# Patient Record
Sex: FEMALE
[De-identification: names, ages, dates, MRNs, and addresses within clinical notes are randomized per-mention and may not be internally consistent; named-entity substitution may affect disease eponyms.]

---

## 2017-01-03 ENCOUNTER — APPOINTMENT (OUTPATIENT)
Dept: CARDIOLOGY | Facility: CLINIC | Age: 37
End: 2017-01-03

## 2017-01-03 VITALS
HEART RATE: 87 BPM | SYSTOLIC BLOOD PRESSURE: 114 MMHG | OXYGEN SATURATION: 94 % | BODY MASS INDEX: 29.59 KG/M2 | HEIGHT: 63 IN | WEIGHT: 167 LBS | DIASTOLIC BLOOD PRESSURE: 71 MMHG

## 2017-02-01 ENCOUNTER — MEDICATION RENEWAL (OUTPATIENT)
Age: 37
End: 2017-02-01

## 2017-04-12 ENCOUNTER — APPOINTMENT (OUTPATIENT)
Dept: INTERNAL MEDICINE | Facility: CLINIC | Age: 37
End: 2017-04-12

## 2017-04-12 VITALS
DIASTOLIC BLOOD PRESSURE: 80 MMHG | SYSTOLIC BLOOD PRESSURE: 128 MMHG | HEART RATE: 94 BPM | TEMPERATURE: 98.6 F | BODY MASS INDEX: 29.95 KG/M2 | OXYGEN SATURATION: 98 % | HEIGHT: 63 IN | WEIGHT: 169 LBS

## 2017-04-14 LAB — TSH SERPL-ACNC: 3.13 UIU/ML

## 2017-04-17 ENCOUNTER — FORM ENCOUNTER (OUTPATIENT)
Age: 37
End: 2017-04-17

## 2017-04-18 ENCOUNTER — OUTPATIENT (OUTPATIENT)
Dept: OUTPATIENT SERVICES | Facility: HOSPITAL | Age: 37
LOS: 1 days | End: 2017-04-18
Payer: MEDICAID

## 2017-04-18 ENCOUNTER — APPOINTMENT (OUTPATIENT)
Dept: ULTRASOUND IMAGING | Facility: CLINIC | Age: 37
End: 2017-04-18

## 2017-04-18 ENCOUNTER — APPOINTMENT (OUTPATIENT)
Dept: MAMMOGRAPHY | Facility: CLINIC | Age: 37
End: 2017-04-18

## 2017-04-18 DIAGNOSIS — M54.9 DORSALGIA, UNSPECIFIED: ICD-10-CM

## 2017-04-18 DIAGNOSIS — E89.0 POSTPROCEDURAL HYPOTHYROIDISM: Chronic | ICD-10-CM

## 2017-04-18 DIAGNOSIS — N63 UNSPECIFIED LUMP IN BREAST: ICD-10-CM

## 2017-04-18 PROCEDURE — 77066 DX MAMMO INCL CAD BI: CPT

## 2017-04-18 PROCEDURE — G0279: CPT

## 2017-04-18 PROCEDURE — 76642 ULTRASOUND BREAST LIMITED: CPT

## 2017-05-03 ENCOUNTER — APPOINTMENT (OUTPATIENT)
Dept: INTERNAL MEDICINE | Facility: CLINIC | Age: 37
End: 2017-05-03

## 2017-05-10 ENCOUNTER — MEDICATION RENEWAL (OUTPATIENT)
Age: 37
End: 2017-05-10

## 2017-07-10 ENCOUNTER — APPOINTMENT (OUTPATIENT)
Dept: INTERNAL MEDICINE | Facility: CLINIC | Age: 37
End: 2017-07-10

## 2017-07-10 ENCOUNTER — OTHER (OUTPATIENT)
Age: 37
End: 2017-07-10

## 2017-07-10 ENCOUNTER — NON-APPOINTMENT (OUTPATIENT)
Age: 37
End: 2017-07-10

## 2017-07-10 VITALS
SYSTOLIC BLOOD PRESSURE: 102 MMHG | HEART RATE: 90 BPM | OXYGEN SATURATION: 97 % | DIASTOLIC BLOOD PRESSURE: 64 MMHG | TEMPERATURE: 97.3 F | HEIGHT: 63 IN | WEIGHT: 168 LBS | BODY MASS INDEX: 29.77 KG/M2

## 2017-07-11 LAB
25(OH)D3 SERPL-MCNC: 13.1 NG/ML
BASOPHILS # BLD AUTO: 0.03 K/UL
BASOPHILS NFR BLD AUTO: 0.4 %
EOSINOPHIL # BLD AUTO: 0.04 K/UL
EOSINOPHIL NFR BLD AUTO: 0.5 %
HBA1C MFR BLD HPLC: 5.5 %
HCT VFR BLD CALC: 40.3 %
HGB BLD-MCNC: 13.5 G/DL
HIV1+2 AB SPEC QL IA.RAPID: NONREACTIVE
IMM GRANULOCYTES NFR BLD AUTO: 0.1 %
LYMPHOCYTES # BLD AUTO: 2.74 K/UL
LYMPHOCYTES NFR BLD AUTO: 34.4 %
MAN DIFF?: NORMAL
MCHC RBC-ENTMCNC: 30.8 PG
MCHC RBC-ENTMCNC: 33.5 GM/DL
MCV RBC AUTO: 92 FL
MONOCYTES # BLD AUTO: 0.64 K/UL
MONOCYTES NFR BLD AUTO: 8 %
NEUTROPHILS # BLD AUTO: 4.5 K/UL
NEUTROPHILS NFR BLD AUTO: 56.6 %
PLATELET # BLD AUTO: 195 K/UL
RBC # BLD: 4.38 M/UL
RBC # FLD: 13.7 %
TSH SERPL-ACNC: 1.42 UIU/ML
WBC # FLD AUTO: 7.96 K/UL

## 2017-07-12 ENCOUNTER — CLINICAL ADVICE (OUTPATIENT)
Age: 37
End: 2017-07-12

## 2017-07-12 LAB
ALBUMIN SERPL ELPH-MCNC: 4.4 G/DL
ALP BLD-CCNC: 74 U/L
ALT SERPL-CCNC: 10 U/L
ANION GAP SERPL CALC-SCNC: 13 MMOL/L
AST SERPL-CCNC: 20 U/L
BILIRUB SERPL-MCNC: 0.2 MG/DL
BUN SERPL-MCNC: 14 MG/DL
CALCIUM SERPL-MCNC: 9 MG/DL
CHLORIDE SERPL-SCNC: 106 MMOL/L
CHOLEST SERPL-MCNC: 164 MG/DL
CHOLEST/HDLC SERPL: 2.6 RATIO
CK SERPL-CCNC: 290 U/L
CO2 SERPL-SCNC: 24 MMOL/L
CREAT SERPL-MCNC: 0.92 MG/DL
GLUCOSE SERPL-MCNC: 71 MG/DL
HDLC SERPL-MCNC: 63 MG/DL
LDLC SERPL CALC-MCNC: 92 MG/DL
POTASSIUM SERPL-SCNC: 4.2 MMOL/L
PROT SERPL-MCNC: 7.3 G/DL
SODIUM SERPL-SCNC: 143 MMOL/L
TRIGL SERPL-MCNC: 45 MG/DL
URATE SERPL-MCNC: 2.2 MG/DL

## 2017-08-07 ENCOUNTER — RESULT CHARGE (OUTPATIENT)
Age: 37
End: 2017-08-07

## 2017-08-08 ENCOUNTER — APPOINTMENT (OUTPATIENT)
Dept: CARDIOLOGY | Facility: CLINIC | Age: 37
End: 2017-08-08
Payer: MEDICAID

## 2017-08-08 VITALS
HEART RATE: 78 BPM | OXYGEN SATURATION: 98 % | WEIGHT: 167 LBS | BODY MASS INDEX: 29.59 KG/M2 | DIASTOLIC BLOOD PRESSURE: 74 MMHG | SYSTOLIC BLOOD PRESSURE: 110 MMHG | HEIGHT: 63 IN

## 2017-08-08 PROCEDURE — 99214 OFFICE O/P EST MOD 30 MIN: CPT | Mod: 25

## 2017-08-08 PROCEDURE — 93000 ELECTROCARDIOGRAM COMPLETE: CPT

## 2017-08-09 ENCOUNTER — APPOINTMENT (OUTPATIENT)
Dept: INTERNAL MEDICINE | Facility: CLINIC | Age: 37
End: 2017-08-09

## 2017-08-11 ENCOUNTER — MEDICATION RENEWAL (OUTPATIENT)
Age: 37
End: 2017-08-11

## 2017-08-15 ENCOUNTER — APPOINTMENT (OUTPATIENT)
Dept: CARDIOLOGY | Facility: CLINIC | Age: 37
End: 2017-08-15
Payer: MEDICAID

## 2017-08-15 PROCEDURE — 93306 TTE W/DOPPLER COMPLETE: CPT

## 2017-11-11 ENCOUNTER — MEDICATION RENEWAL (OUTPATIENT)
Age: 37
End: 2017-11-11

## 2017-12-12 ENCOUNTER — LABORATORY RESULT (OUTPATIENT)
Age: 37
End: 2017-12-12

## 2017-12-13 ENCOUNTER — MEDICATION RENEWAL (OUTPATIENT)
Age: 37
End: 2017-12-13

## 2017-12-16 ENCOUNTER — APPOINTMENT (OUTPATIENT)
Dept: INTERNAL MEDICINE | Facility: CLINIC | Age: 37
End: 2017-12-16
Payer: MEDICAID

## 2017-12-16 VITALS
SYSTOLIC BLOOD PRESSURE: 110 MMHG | BODY MASS INDEX: 29.41 KG/M2 | DIASTOLIC BLOOD PRESSURE: 70 MMHG | WEIGHT: 166 LBS | HEART RATE: 80 BPM | HEIGHT: 63 IN | TEMPERATURE: 97.9 F | OXYGEN SATURATION: 96 %

## 2017-12-16 DIAGNOSIS — R31.29 OTHER MICROSCOPIC HEMATURIA: ICD-10-CM

## 2017-12-16 DIAGNOSIS — Z23 ENCOUNTER FOR IMMUNIZATION: ICD-10-CM

## 2017-12-16 PROCEDURE — 99214 OFFICE O/P EST MOD 30 MIN: CPT

## 2017-12-16 RX ORDER — CHOLECALCIFEROL (VITAMIN D3) 1250 MCG
1.25 MG CAPSULE ORAL
Qty: 4 | Refills: 0 | Status: COMPLETED | COMMUNITY
Start: 2017-07-11

## 2017-12-17 ENCOUNTER — FORM ENCOUNTER (OUTPATIENT)
Age: 37
End: 2017-12-17

## 2017-12-18 ENCOUNTER — OUTPATIENT (OUTPATIENT)
Dept: OUTPATIENT SERVICES | Facility: HOSPITAL | Age: 37
LOS: 1 days | End: 2017-12-18
Payer: MEDICAID

## 2017-12-18 ENCOUNTER — APPOINTMENT (OUTPATIENT)
Dept: ULTRASOUND IMAGING | Facility: CLINIC | Age: 37
End: 2017-12-18
Payer: MEDICAID

## 2017-12-18 DIAGNOSIS — Z00.8 ENCOUNTER FOR OTHER GENERAL EXAMINATION: ICD-10-CM

## 2017-12-18 DIAGNOSIS — E89.0 POSTPROCEDURAL HYPOTHYROIDISM: Chronic | ICD-10-CM

## 2017-12-18 LAB — TSH SERPL-ACNC: 1.55 UIU/ML

## 2017-12-18 PROCEDURE — 76857 US EXAM PELVIC LIMITED: CPT

## 2017-12-18 PROCEDURE — 76857 US EXAM PELVIC LIMITED: CPT | Mod: 26

## 2017-12-22 ENCOUNTER — APPOINTMENT (OUTPATIENT)
Dept: INTERNAL MEDICINE | Facility: CLINIC | Age: 37
End: 2017-12-22

## 2018-01-01 ENCOUNTER — OUTPATIENT (OUTPATIENT)
Dept: OUTPATIENT SERVICES | Facility: HOSPITAL | Age: 38
LOS: 1 days | End: 2018-01-01
Payer: MEDICAID

## 2018-01-01 DIAGNOSIS — E89.0 POSTPROCEDURAL HYPOTHYROIDISM: Chronic | ICD-10-CM

## 2018-01-01 PROCEDURE — G9001: CPT

## 2018-01-12 ENCOUNTER — TRANSCRIPTION ENCOUNTER (OUTPATIENT)
Age: 38
End: 2018-01-12

## 2018-01-13 ENCOUNTER — EMERGENCY (EMERGENCY)
Facility: HOSPITAL | Age: 38
LOS: 1 days | Discharge: HOME | End: 2018-01-13
Attending: EMERGENCY MEDICINE
Payer: COMMERCIAL

## 2018-01-13 ENCOUNTER — MEDICATION RENEWAL (OUTPATIENT)
Age: 38
End: 2018-01-13

## 2018-01-13 VITALS
HEIGHT: 64 IN | TEMPERATURE: 97 F | WEIGHT: 169.98 LBS | RESPIRATION RATE: 16 BRPM | SYSTOLIC BLOOD PRESSURE: 120 MMHG | OXYGEN SATURATION: 99 % | HEART RATE: 78 BPM | DIASTOLIC BLOOD PRESSURE: 81 MMHG

## 2018-01-13 VITALS
HEART RATE: 74 BPM | SYSTOLIC BLOOD PRESSURE: 124 MMHG | DIASTOLIC BLOOD PRESSURE: 78 MMHG | OXYGEN SATURATION: 99 % | RESPIRATION RATE: 18 BRPM

## 2018-01-13 DIAGNOSIS — E89.0 POSTPROCEDURAL HYPOTHYROIDISM: Chronic | ICD-10-CM

## 2018-01-13 LAB
ALBUMIN SERPL ELPH-MCNC: 3.8 G/DL — SIGNIFICANT CHANGE UP (ref 3.3–5.2)
ALP SERPL-CCNC: 87 U/L — SIGNIFICANT CHANGE UP (ref 40–120)
ALT FLD-CCNC: 8 U/L — SIGNIFICANT CHANGE UP
ANION GAP SERPL CALC-SCNC: 13 MMOL/L — SIGNIFICANT CHANGE UP (ref 5–17)
APPEARANCE UR: CLEAR — SIGNIFICANT CHANGE UP
APTT BLD: 28.6 SEC — SIGNIFICANT CHANGE UP (ref 27.5–37.4)
AST SERPL-CCNC: 16 U/L — SIGNIFICANT CHANGE UP
BASOPHILS # BLD AUTO: 0 K/UL — SIGNIFICANT CHANGE UP (ref 0–0.2)
BASOPHILS NFR BLD AUTO: 0.3 % — SIGNIFICANT CHANGE UP (ref 0–2)
BILIRUB SERPL-MCNC: 0.2 MG/DL — LOW (ref 0.4–2)
BILIRUB UR-MCNC: NEGATIVE — SIGNIFICANT CHANGE UP
BUN SERPL-MCNC: 12 MG/DL — SIGNIFICANT CHANGE UP (ref 8–20)
CALCIUM SERPL-MCNC: 8.8 MG/DL — SIGNIFICANT CHANGE UP (ref 8.6–10.2)
CHLORIDE SERPL-SCNC: 105 MMOL/L — SIGNIFICANT CHANGE UP (ref 98–107)
CO2 SERPL-SCNC: 25 MMOL/L — SIGNIFICANT CHANGE UP (ref 22–29)
COLOR SPEC: YELLOW — SIGNIFICANT CHANGE UP
CREAT SERPL-MCNC: 0.61 MG/DL — SIGNIFICANT CHANGE UP (ref 0.5–1.3)
DIFF PNL FLD: ABNORMAL
EOSINOPHIL # BLD AUTO: 0 K/UL — SIGNIFICANT CHANGE UP (ref 0–0.5)
EOSINOPHIL NFR BLD AUTO: 0.6 % — SIGNIFICANT CHANGE UP (ref 0–6)
EPI CELLS # UR: SIGNIFICANT CHANGE UP
GLUCOSE SERPL-MCNC: 81 MG/DL — SIGNIFICANT CHANGE UP (ref 70–115)
GLUCOSE UR QL: NEGATIVE MG/DL — SIGNIFICANT CHANGE UP
HCG UR QL: NEGATIVE — SIGNIFICANT CHANGE UP
HCT VFR BLD CALC: 40.9 % — SIGNIFICANT CHANGE UP (ref 37–47)
HGB BLD-MCNC: 13.8 G/DL — SIGNIFICANT CHANGE UP (ref 12–16)
INR BLD: 1.09 RATIO — SIGNIFICANT CHANGE UP (ref 0.88–1.16)
KETONES UR-MCNC: NEGATIVE — SIGNIFICANT CHANGE UP
LEUKOCYTE ESTERASE UR-ACNC: ABNORMAL
LYMPHOCYTES # BLD AUTO: 1.7 K/UL — SIGNIFICANT CHANGE UP (ref 1–4.8)
LYMPHOCYTES # BLD AUTO: 23.7 % — SIGNIFICANT CHANGE UP (ref 20–55)
MAGNESIUM SERPL-MCNC: 1.8 MG/DL — SIGNIFICANT CHANGE UP (ref 1.6–2.6)
MCHC RBC-ENTMCNC: 30.7 PG — SIGNIFICANT CHANGE UP (ref 27–31)
MCHC RBC-ENTMCNC: 33.7 G/DL — SIGNIFICANT CHANGE UP (ref 32–36)
MCV RBC AUTO: 91.1 FL — SIGNIFICANT CHANGE UP (ref 81–99)
MONOCYTES # BLD AUTO: 0.7 K/UL — SIGNIFICANT CHANGE UP (ref 0–0.8)
MONOCYTES NFR BLD AUTO: 9.8 % — SIGNIFICANT CHANGE UP (ref 3–10)
NEUTROPHILS # BLD AUTO: 4.7 K/UL — SIGNIFICANT CHANGE UP (ref 1.8–8)
NEUTROPHILS NFR BLD AUTO: 65.6 % — SIGNIFICANT CHANGE UP (ref 37–73)
NITRITE UR-MCNC: NEGATIVE — SIGNIFICANT CHANGE UP
PH UR: 7 — SIGNIFICANT CHANGE UP (ref 5–8)
PLATELET # BLD AUTO: 187 K/UL — SIGNIFICANT CHANGE UP (ref 150–400)
POTASSIUM SERPL-MCNC: 3.8 MMOL/L — SIGNIFICANT CHANGE UP (ref 3.5–5.3)
POTASSIUM SERPL-SCNC: 3.8 MMOL/L — SIGNIFICANT CHANGE UP (ref 3.5–5.3)
PROT SERPL-MCNC: 6.9 G/DL — SIGNIFICANT CHANGE UP (ref 6.6–8.7)
PROT UR-MCNC: NEGATIVE MG/DL — SIGNIFICANT CHANGE UP
PROTHROM AB SERPL-ACNC: 12 SEC — SIGNIFICANT CHANGE UP (ref 9.8–12.7)
RBC # BLD: 4.49 M/UL — SIGNIFICANT CHANGE UP (ref 4.4–5.2)
RBC # FLD: 12.6 % — SIGNIFICANT CHANGE UP (ref 11–15.6)
RBC CASTS # UR COMP ASSIST: ABNORMAL /HPF (ref 0–4)
SODIUM SERPL-SCNC: 143 MMOL/L — SIGNIFICANT CHANGE UP (ref 135–145)
SP GR SPEC: 1.01 — SIGNIFICANT CHANGE UP (ref 1.01–1.02)
TSH SERPL-MCNC: 0.91 UIU/ML — SIGNIFICANT CHANGE UP (ref 0.27–4.2)
URATE SERPL-MCNC: 2.7 MG/DL — SIGNIFICANT CHANGE UP (ref 2.4–5.7)
UROBILINOGEN FLD QL: NEGATIVE MG/DL — SIGNIFICANT CHANGE UP
WBC # BLD: 7.1 K/UL — SIGNIFICANT CHANGE UP (ref 4.8–10.8)
WBC # FLD AUTO: 7.1 K/UL — SIGNIFICANT CHANGE UP (ref 4.8–10.8)
WBC UR QL: SIGNIFICANT CHANGE UP

## 2018-01-13 PROCEDURE — 84550 ASSAY OF BLOOD/URIC ACID: CPT

## 2018-01-13 PROCEDURE — 93005 ELECTROCARDIOGRAM TRACING: CPT

## 2018-01-13 PROCEDURE — 71046 X-RAY EXAM CHEST 2 VIEWS: CPT

## 2018-01-13 PROCEDURE — 85730 THROMBOPLASTIN TIME PARTIAL: CPT

## 2018-01-13 PROCEDURE — 96374 THER/PROPH/DIAG INJ IV PUSH: CPT

## 2018-01-13 PROCEDURE — 84443 ASSAY THYROID STIM HORMONE: CPT

## 2018-01-13 PROCEDURE — 36415 COLL VENOUS BLD VENIPUNCTURE: CPT

## 2018-01-13 PROCEDURE — 81025 URINE PREGNANCY TEST: CPT

## 2018-01-13 PROCEDURE — 83735 ASSAY OF MAGNESIUM: CPT

## 2018-01-13 PROCEDURE — 81001 URINALYSIS AUTO W/SCOPE: CPT

## 2018-01-13 PROCEDURE — 85027 COMPLETE CBC AUTOMATED: CPT

## 2018-01-13 PROCEDURE — 80053 COMPREHEN METABOLIC PANEL: CPT

## 2018-01-13 PROCEDURE — 93010 ELECTROCARDIOGRAM REPORT: CPT

## 2018-01-13 PROCEDURE — 99284 EMERGENCY DEPT VISIT MOD MDM: CPT | Mod: 25

## 2018-01-13 PROCEDURE — 99284 EMERGENCY DEPT VISIT MOD MDM: CPT

## 2018-01-13 PROCEDURE — 71046 X-RAY EXAM CHEST 2 VIEWS: CPT | Mod: 26

## 2018-01-13 PROCEDURE — 85610 PROTHROMBIN TIME: CPT

## 2018-01-13 RX ORDER — METOCLOPRAMIDE HCL 10 MG
10 TABLET ORAL ONCE
Qty: 0 | Refills: 0 | Status: COMPLETED | OUTPATIENT
Start: 2018-01-13 | End: 2018-01-13

## 2018-01-13 RX ORDER — SODIUM CHLORIDE 9 MG/ML
999 INJECTION INTRAMUSCULAR; INTRAVENOUS; SUBCUTANEOUS ONCE
Qty: 0 | Refills: 0 | Status: COMPLETED | OUTPATIENT
Start: 2018-01-13 | End: 2018-01-13

## 2018-01-13 RX ADMIN — Medication 104 MILLIGRAM(S): at 21:00

## 2018-01-13 RX ADMIN — SODIUM CHLORIDE 999 MILLILITER(S): 9 INJECTION INTRAMUSCULAR; INTRAVENOUS; SUBCUTANEOUS at 21:00

## 2018-01-13 NOTE — ED ADULT TRIAGE NOTE - CHIEF COMPLAINT QUOTE
pt presents to ED with tingling to top of head and palpitations x few days. pt c.o chest pain and sob, breathing si even and unlabored. a&ox3 pt also c/o pressure in her ears and her nose with chills.

## 2018-01-13 NOTE — ED PROVIDER NOTE - NS ED ROS FT
no weight change, no fever or chills  no rash, no bruises  no visual changes no eye discharge  no cough cold or congestion,   no sob, no chest pain, +palpitations   no orthopnea, no pnd  no abd pain, no n/v/d  no hematuria, no change in urinary habits  no joint pain, no deformity  no headache, + paresthesia no weight change, no fever or chills  no rash, no bruises  no visual changes no eye discharge  no cough cold or congestion,   + sob, + chest pain, +palpitations   no orthopnea, no pnd  no abd pain, no n/v/d  no hematuria, no change in urinary habits  no joint pain, no deformity  no headache, + paresthesia

## 2018-01-13 NOTE — ED PROVIDER NOTE - MEDICAL DECISION MAKING DETAILS
36 y/o with hx 38 y/o with hx of anxiety presents for hyperventilation and headache, plan obtain labs, fluids, and reevaluate.

## 2018-01-13 NOTE — ED PROVIDER NOTE - PHYSICAL EXAMINATION
Constitutional : Appears comfortably, talking in full sentences  Head :NC AT , no swelling  Eyes :eomi, no swelling  Mouth :mm moist,  Neck : supple, trachea in midline  Chest :Juan air entry, symm chest expansion, no distress  Heart :S1 S2 distant  Abdomen :abd soft, non tender  Musc/Skel :ext no swelling, no deformity, no spine tenderness, distal pulses present  Neuro  :AAO 3 no focal deficits Constitutional : Appears comfortably, talking in full sentences  Head :NC AT , no swelling  Eyes :eomi, no swelling  Mouth :mm moist,  Neck : supple, trachea in midline  Chest :Juan air entry, symm chest expansion, no distress  Heart :S1 S2 distant  Abdomen :abd soft, non tender  Musc/Skel : keloid scar to neck. ext no swelling, no deformity, no spine tenderness, distal pulses present  Neuro  :AAO 3 no focal deficits Constitutional : Appears comfortably, talking in full sentences  Head :NC AT , no swelling  Eyes : exophthalmos, eomi, no swelling  Mouth :mm moist,  Neck : supple, trachea in midline  Chest :Juan air entry, symm chest expansion, no distress  Heart :S1 S2 distant  Abdomen :abd soft, non tender  Musc/Skel : keloid scar to neck. ext no swelling, no deformity, no spine tenderness, distal pulses present  Neuro  :AAO 3 no focal deficits

## 2018-01-13 NOTE — ED PROVIDER NOTE - OBJECTIVE STATEMENT
36 y/o female with PMhx glaucoma, Graves disease, hyperthyroidism, mitral valve prolapse, tricuspid valve insufficiency, and PSHx thyroidectomy presents to the ED with c/o palpitations, onset today. Pt reports paresthesia, chest pain, SOB, and congestion. 38 y/o female smoker with PMhx glaucoma, Graves disease, hyperthyroidism, mitral valve prolapse, tricuspid valve insufficiency, and PSHx thyroidectomy (pt is on Synthroid) presents to the ED with c/o palpitations, onset 3 days ago that became worse today. Pt states she was playing a video game when head and face began tingling. Pt reports paresthesia, chest pain, SOB, and headache. Per pt she recently had death in the family. Pt denies n/v, medication changes, fever, chills, and any other acute symptoms and complaints at this time.

## 2018-01-13 NOTE — ED ADULT NURSE NOTE - CHPI ED SYMPTOMS NEG
no dizziness/no chills/no diaphoresis/no back pain/no chest pain/no cough/no vomiting/no shortness of breath/no fever/no syncope

## 2018-01-13 NOTE — ED PROVIDER NOTE - CHPI ED SYMPTOMS POS
CHEST PAIN/PALPITATIONS/SHORTNESS OF BREATH SHORTNESS OF BREATH/PALPITATIONS/CHEST PAIN/tingling, HA

## 2018-01-13 NOTE — ED ADULT NURSE NOTE - OBJECTIVE STATEMENT
patient states that she has tingling to her head- face, nose and ears clogged, feeling flush at times- hot than cold with palpitaions, has HX of thyroid and MVP

## 2018-01-13 NOTE — ED PROVIDER NOTE - PMH
Glaucoma    Goiter    Graves' disease    Hyperthyroidism    Mitral valve prolapse    Non-rheumatic mitral regurgitation    Non-rheumatic tricuspid valve insufficiency    Post-therapeutic hypothyroidism

## 2018-01-15 ENCOUNTER — CHART COPY (OUTPATIENT)
Age: 38
End: 2018-01-15

## 2018-01-15 ENCOUNTER — CLINICAL ADVICE (OUTPATIENT)
Age: 38
End: 2018-01-15

## 2018-01-16 DIAGNOSIS — R69 ILLNESS, UNSPECIFIED: ICD-10-CM

## 2018-01-21 ENCOUNTER — TRANSCRIPTION ENCOUNTER (OUTPATIENT)
Age: 38
End: 2018-01-21

## 2018-01-24 ENCOUNTER — APPOINTMENT (OUTPATIENT)
Dept: INTERNAL MEDICINE | Facility: CLINIC | Age: 38
End: 2018-01-24
Payer: MEDICAID

## 2018-01-24 VITALS
BODY MASS INDEX: 29.23 KG/M2 | TEMPERATURE: 98.4 F | HEART RATE: 89 BPM | OXYGEN SATURATION: 97 % | DIASTOLIC BLOOD PRESSURE: 72 MMHG | HEIGHT: 63 IN | WEIGHT: 165 LBS | SYSTOLIC BLOOD PRESSURE: 110 MMHG

## 2018-01-24 PROCEDURE — 99214 OFFICE O/P EST MOD 30 MIN: CPT

## 2018-01-24 RX ORDER — LEVOTHYROXINE SODIUM 100 UG/1
100 TABLET ORAL DAILY
Qty: 30 | Refills: 2 | Status: DISCONTINUED | COMMUNITY
Start: 2018-01-15 | End: 2018-01-24

## 2018-02-13 ENCOUNTER — APPOINTMENT (OUTPATIENT)
Dept: CARDIOLOGY | Facility: CLINIC | Age: 38
End: 2018-02-13
Payer: MEDICAID

## 2018-02-13 VITALS
HEIGHT: 63 IN | SYSTOLIC BLOOD PRESSURE: 108 MMHG | DIASTOLIC BLOOD PRESSURE: 70 MMHG | WEIGHT: 167 LBS | HEART RATE: 87 BPM | OXYGEN SATURATION: 99 % | BODY MASS INDEX: 29.59 KG/M2

## 2018-02-13 PROCEDURE — 99214 OFFICE O/P EST MOD 30 MIN: CPT

## 2018-02-17 ENCOUNTER — FORM ENCOUNTER (OUTPATIENT)
Age: 38
End: 2018-02-17

## 2018-02-18 ENCOUNTER — APPOINTMENT (OUTPATIENT)
Dept: MRI IMAGING | Facility: CLINIC | Age: 38
End: 2018-02-18
Payer: MEDICAID

## 2018-02-18 ENCOUNTER — OUTPATIENT (OUTPATIENT)
Dept: OUTPATIENT SERVICES | Facility: HOSPITAL | Age: 38
LOS: 1 days | End: 2018-02-18
Payer: MEDICAID

## 2018-02-18 DIAGNOSIS — E89.0 POSTPROCEDURAL HYPOTHYROIDISM: Chronic | ICD-10-CM

## 2018-02-18 DIAGNOSIS — F43.0 ACUTE STRESS REACTION: ICD-10-CM

## 2018-02-18 PROCEDURE — 72141 MRI NECK SPINE W/O DYE: CPT

## 2018-02-18 PROCEDURE — 72141 MRI NECK SPINE W/O DYE: CPT | Mod: 26

## 2018-02-23 ENCOUNTER — MEDICATION RENEWAL (OUTPATIENT)
Age: 38
End: 2018-02-23

## 2018-03-26 ENCOUNTER — MEDICATION RENEWAL (OUTPATIENT)
Age: 38
End: 2018-03-26

## 2018-03-26 ENCOUNTER — RX RENEWAL (OUTPATIENT)
Age: 38
End: 2018-03-26

## 2018-03-28 ENCOUNTER — MEDICATION RENEWAL (OUTPATIENT)
Age: 38
End: 2018-03-28

## 2018-03-28 ENCOUNTER — APPOINTMENT (OUTPATIENT)
Dept: INTERNAL MEDICINE | Facility: CLINIC | Age: 38
End: 2018-03-28
Payer: MEDICAID

## 2018-03-28 VITALS
BODY MASS INDEX: 30.3 KG/M2 | OXYGEN SATURATION: 97 % | WEIGHT: 171 LBS | HEIGHT: 63 IN | SYSTOLIC BLOOD PRESSURE: 110 MMHG | DIASTOLIC BLOOD PRESSURE: 70 MMHG | TEMPERATURE: 98.2 F | HEART RATE: 91 BPM

## 2018-03-28 DIAGNOSIS — Z11.1 ENCOUNTER FOR SCREENING FOR RESPIRATORY TUBERCULOSIS: ICD-10-CM

## 2018-03-28 PROCEDURE — 99214 OFFICE O/P EST MOD 30 MIN: CPT

## 2018-04-24 ENCOUNTER — CLINICAL ADVICE (OUTPATIENT)
Age: 38
End: 2018-04-24

## 2018-04-25 ENCOUNTER — MEDICATION RENEWAL (OUTPATIENT)
Age: 38
End: 2018-04-25

## 2018-04-27 ENCOUNTER — APPOINTMENT (OUTPATIENT)
Dept: DERMATOLOGY | Facility: CLINIC | Age: 38
End: 2018-04-27
Payer: MEDICAID

## 2018-04-27 PROCEDURE — 99203 OFFICE O/P NEW LOW 30 MIN: CPT

## 2018-05-26 ENCOUNTER — MEDICATION RENEWAL (OUTPATIENT)
Age: 38
End: 2018-05-26

## 2018-06-19 ENCOUNTER — APPOINTMENT (OUTPATIENT)
Dept: INTERNAL MEDICINE | Facility: CLINIC | Age: 38
End: 2018-06-19

## 2018-06-25 ENCOUNTER — APPOINTMENT (OUTPATIENT)
Dept: INTERNAL MEDICINE | Facility: CLINIC | Age: 38
End: 2018-06-25

## 2018-07-11 ENCOUNTER — APPOINTMENT (OUTPATIENT)
Dept: DERMATOLOGY | Facility: CLINIC | Age: 38
End: 2018-07-11
Payer: MEDICAID

## 2018-07-11 PROCEDURE — 17106 DSTR CUT VSC PRLF LES<10SQCM: CPT

## 2018-07-27 ENCOUNTER — OTHER (OUTPATIENT)
Age: 38
End: 2018-07-27

## 2018-07-27 ENCOUNTER — APPOINTMENT (OUTPATIENT)
Dept: INTERNAL MEDICINE | Facility: CLINIC | Age: 38
End: 2018-07-27
Payer: MEDICAID

## 2018-07-27 VITALS
HEART RATE: 93 BPM | OXYGEN SATURATION: 98 % | BODY MASS INDEX: 30.3 KG/M2 | SYSTOLIC BLOOD PRESSURE: 110 MMHG | HEIGHT: 63 IN | DIASTOLIC BLOOD PRESSURE: 80 MMHG | TEMPERATURE: 98.6 F | WEIGHT: 171 LBS

## 2018-07-27 PROCEDURE — 36415 COLL VENOUS BLD VENIPUNCTURE: CPT

## 2018-07-27 PROCEDURE — 99214 OFFICE O/P EST MOD 30 MIN: CPT | Mod: 25

## 2018-07-27 RX ORDER — METRONIDAZOLE 7.5 MG/G
0.75 GEL VAGINAL
Qty: 70 | Refills: 0 | Status: COMPLETED | COMMUNITY
Start: 2018-03-15

## 2018-07-27 RX ORDER — MINOXIDIL 2 %
1-0.3 SOLUTION, NON-ORAL TOPICAL
Qty: 30 | Refills: 0 | Status: COMPLETED | COMMUNITY
Start: 2018-02-26

## 2018-07-30 LAB
ALBUMIN SERPL ELPH-MCNC: 4.6 G/DL
ALP BLD-CCNC: 81 U/L
ALT SERPL-CCNC: 14 U/L
ANION GAP SERPL CALC-SCNC: 15 MMOL/L
AST SERPL-CCNC: 23 U/L
BASOPHILS # BLD AUTO: 0.03 K/UL
BASOPHILS NFR BLD AUTO: 0.5 %
BILIRUB SERPL-MCNC: 0.3 MG/DL
BUN SERPL-MCNC: 14 MG/DL
CALCIUM SERPL-MCNC: 9.3 MG/DL
CHLORIDE SERPL-SCNC: 102 MMOL/L
CO2 SERPL-SCNC: 25 MMOL/L
CREAT SERPL-MCNC: 0.8 MG/DL
EOSINOPHIL # BLD AUTO: 0.08 K/UL
EOSINOPHIL NFR BLD AUTO: 1.3 %
GLUCOSE SERPL-MCNC: 93 MG/DL
HCT VFR BLD CALC: 45.9 %
HGB BLD-MCNC: 14.7 G/DL
IMM GRANULOCYTES NFR BLD AUTO: 0 %
LYMPHOCYTES # BLD AUTO: 1.95 K/UL
LYMPHOCYTES NFR BLD AUTO: 32.1 %
MAN DIFF?: NORMAL
MCHC RBC-ENTMCNC: 30.1 PG
MCHC RBC-ENTMCNC: 32 GM/DL
MCV RBC AUTO: 94.1 FL
MONOCYTES # BLD AUTO: 0.58 K/UL
MONOCYTES NFR BLD AUTO: 9.5 %
NEUTROPHILS # BLD AUTO: 3.44 K/UL
NEUTROPHILS NFR BLD AUTO: 56.6 %
PLATELET # BLD AUTO: 204 K/UL
POTASSIUM SERPL-SCNC: 4.6 MMOL/L
PROT SERPL-MCNC: 7.3 G/DL
RBC # BLD: 4.88 M/UL
RBC # FLD: 13.7 %
SODIUM SERPL-SCNC: 142 MMOL/L
T3 SERPL-MCNC: 90 NG/DL
T4 FREE SERPL-MCNC: 1.8 NG/DL
T4 SERPL-MCNC: 11.1 UG/DL
TSH SERPL-ACNC: 0.86 UIU/ML
WBC # FLD AUTO: 6.08 K/UL

## 2018-07-30 RX ADMIN — CYANOCOBALAMIN 0 MCG/ML: 1000 INJECTION, SOLUTION INTRAMUSCULAR at 00:00

## 2018-08-07 ENCOUNTER — APPOINTMENT (OUTPATIENT)
Dept: CARDIOLOGY | Facility: CLINIC | Age: 38
End: 2018-08-07

## 2018-08-15 ENCOUNTER — APPOINTMENT (OUTPATIENT)
Dept: DERMATOLOGY | Facility: CLINIC | Age: 38
End: 2018-08-15

## 2018-08-21 ENCOUNTER — APPOINTMENT (OUTPATIENT)
Dept: INTERNAL MEDICINE | Facility: CLINIC | Age: 38
End: 2018-08-21
Payer: MEDICAID

## 2018-08-21 VITALS
HEART RATE: 87 BPM | SYSTOLIC BLOOD PRESSURE: 120 MMHG | OXYGEN SATURATION: 95 % | WEIGHT: 174 LBS | HEIGHT: 63 IN | DIASTOLIC BLOOD PRESSURE: 78 MMHG | BODY MASS INDEX: 30.83 KG/M2

## 2018-08-21 DIAGNOSIS — R51 HEADACHE: ICD-10-CM

## 2018-08-21 DIAGNOSIS — N76.1 SUBACUTE AND CHRONIC VAGINITIS: ICD-10-CM

## 2018-08-21 DIAGNOSIS — K04.7 PERIAPICAL ABSCESS W/OUT SINUS: ICD-10-CM

## 2018-08-21 DIAGNOSIS — R29.898 OTHER SYMPTOMS AND SIGNS INVOLVING THE MUSCULOSKELETAL SYSTEM: ICD-10-CM

## 2018-08-21 PROCEDURE — 96372 THER/PROPH/DIAG INJ SC/IM: CPT

## 2018-08-21 PROCEDURE — 99213 OFFICE O/P EST LOW 20 MIN: CPT

## 2018-08-22 ENCOUNTER — NON-APPOINTMENT (OUTPATIENT)
Age: 38
End: 2018-08-22

## 2018-08-22 ENCOUNTER — APPOINTMENT (OUTPATIENT)
Dept: CARDIOLOGY | Facility: CLINIC | Age: 38
End: 2018-08-22
Payer: MEDICAID

## 2018-08-22 VITALS
WEIGHT: 173 LBS | DIASTOLIC BLOOD PRESSURE: 67 MMHG | OXYGEN SATURATION: 96 % | SYSTOLIC BLOOD PRESSURE: 106 MMHG | BODY MASS INDEX: 30.65 KG/M2 | HEART RATE: 67 BPM | HEIGHT: 63 IN

## 2018-08-22 VITALS — DIASTOLIC BLOOD PRESSURE: 65 MMHG | SYSTOLIC BLOOD PRESSURE: 100 MMHG

## 2018-08-22 PROCEDURE — 93000 ELECTROCARDIOGRAM COMPLETE: CPT

## 2018-08-22 PROCEDURE — 99214 OFFICE O/P EST MOD 30 MIN: CPT

## 2018-08-24 RX ORDER — CYANOCOBALAMIN 1000 UG/ML
1000 INJECTION INTRAMUSCULAR; SUBCUTANEOUS
Qty: 0 | Refills: 0 | Status: COMPLETED | OUTPATIENT
Start: 2018-07-30

## 2018-08-28 RX ADMIN — CYANOCOBALAMIN 0 MCG/ML: 1000 INJECTION INTRAMUSCULAR; SUBCUTANEOUS at 00:00

## 2018-09-26 ENCOUNTER — APPOINTMENT (OUTPATIENT)
Dept: INTERNAL MEDICINE | Facility: CLINIC | Age: 38
End: 2018-09-26

## 2018-10-30 ENCOUNTER — APPOINTMENT (OUTPATIENT)
Dept: DERMATOLOGY | Facility: CLINIC | Age: 38
End: 2018-10-30
Payer: MEDICAID

## 2018-10-30 PROCEDURE — 99214 OFFICE O/P EST MOD 30 MIN: CPT | Mod: 25

## 2018-10-30 PROCEDURE — 11100 BX SKIN SUBCUTANEOUS&/MUCOUS MEMBRANE 1 LESION: CPT

## 2018-11-06 ENCOUNTER — APPOINTMENT (OUTPATIENT)
Dept: INTERNAL MEDICINE | Facility: CLINIC | Age: 38
End: 2018-11-06
Payer: MEDICAID

## 2018-11-06 PROCEDURE — 96372 THER/PROPH/DIAG INJ SC/IM: CPT

## 2018-11-13 ENCOUNTER — APPOINTMENT (OUTPATIENT)
Dept: DERMATOLOGY | Facility: CLINIC | Age: 38
End: 2018-11-13
Payer: MEDICAID

## 2018-11-13 PROCEDURE — 99214 OFFICE O/P EST MOD 30 MIN: CPT

## 2018-11-14 ENCOUNTER — APPOINTMENT (OUTPATIENT)
Dept: INTERNAL MEDICINE | Facility: CLINIC | Age: 38
End: 2018-11-14
Payer: MEDICAID

## 2018-11-14 ENCOUNTER — OTHER (OUTPATIENT)
Age: 38
End: 2018-11-14

## 2018-11-14 ENCOUNTER — NON-APPOINTMENT (OUTPATIENT)
Age: 38
End: 2018-11-14

## 2018-11-14 VITALS
SYSTOLIC BLOOD PRESSURE: 110 MMHG | BODY MASS INDEX: 31.01 KG/M2 | HEIGHT: 63 IN | TEMPERATURE: 97.9 F | DIASTOLIC BLOOD PRESSURE: 80 MMHG | OXYGEN SATURATION: 96 % | HEART RATE: 97 BPM | WEIGHT: 175 LBS

## 2018-11-14 PROCEDURE — 92552 PURE TONE AUDIOMETRY AIR: CPT

## 2018-11-14 PROCEDURE — 94010 BREATHING CAPACITY TEST: CPT

## 2018-11-14 PROCEDURE — 99395 PREV VISIT EST AGE 18-39: CPT | Mod: 25

## 2018-11-14 PROCEDURE — 36415 COLL VENOUS BLD VENIPUNCTURE: CPT

## 2018-11-14 RX ORDER — FLUCONAZOLE 150 MG/1
150 TABLET ORAL
Qty: 1 | Refills: 1 | Status: DISCONTINUED | COMMUNITY
Start: 2018-04-24 | End: 2018-11-14

## 2018-11-14 RX ORDER — NICOTINE 4 MG/1
10 INHALANT RESPIRATORY (INHALATION)
Qty: 1 | Refills: 2 | Status: DISCONTINUED | COMMUNITY
Start: 2017-04-12 | End: 2018-11-14

## 2018-11-15 LAB
25(OH)D3 SERPL-MCNC: 35.9 NG/ML
ALBUMIN SERPL ELPH-MCNC: 4.8 G/DL
ALP BLD-CCNC: 89 U/L
ALT SERPL-CCNC: 9 U/L
ANION GAP SERPL CALC-SCNC: 15 MMOL/L
APPEARANCE: CLEAR
AST SERPL-CCNC: 22 U/L
BASOPHILS # BLD AUTO: 0.01 K/UL
BASOPHILS NFR BLD AUTO: 0.2 %
BILIRUB SERPL-MCNC: 0.4 MG/DL
BILIRUBIN URINE: NEGATIVE
BLOOD URINE: NEGATIVE
BUN SERPL-MCNC: 13 MG/DL
CALCIUM SERPL-MCNC: 9.7 MG/DL
CHLORIDE SERPL-SCNC: 102 MMOL/L
CHOLEST SERPL-MCNC: 190 MG/DL
CHOLEST/HDLC SERPL: 2.6 RATIO
CK SERPL-CCNC: 98 U/L
CO2 SERPL-SCNC: 24 MMOL/L
COLOR: YELLOW
CREAT SERPL-MCNC: 0.84 MG/DL
EOSINOPHIL # BLD AUTO: 0.05 K/UL
EOSINOPHIL NFR BLD AUTO: 0.8 %
GLUCOSE QUALITATIVE U: NEGATIVE MG/DL
GLUCOSE SERPL-MCNC: 85 MG/DL
HBA1C MFR BLD HPLC: 5.6 %
HCT VFR BLD CALC: 47.5 %
HDLC SERPL-MCNC: 72 MG/DL
HGB BLD-MCNC: 15.5 G/DL
HIV1+2 AB SPEC QL IA.RAPID: NONREACTIVE
IMM GRANULOCYTES NFR BLD AUTO: 0.2 %
KETONES URINE: NEGATIVE
LDLC SERPL CALC-MCNC: 109 MG/DL
LEUKOCYTE ESTERASE URINE: NEGATIVE
LYMPHOCYTES # BLD AUTO: 2.29 K/UL
LYMPHOCYTES NFR BLD AUTO: 36.5 %
MAN DIFF?: NORMAL
MCHC RBC-ENTMCNC: 31.1 PG
MCHC RBC-ENTMCNC: 32.6 GM/DL
MCV RBC AUTO: 95.2 FL
MONOCYTES # BLD AUTO: 0.39 K/UL
MONOCYTES NFR BLD AUTO: 6.2 %
NEUTROPHILS # BLD AUTO: 3.52 K/UL
NEUTROPHILS NFR BLD AUTO: 56.1 %
NITRITE URINE: NEGATIVE
PH URINE: 5.5
PLATELET # BLD AUTO: 241 K/UL
POTASSIUM SERPL-SCNC: 4.1 MMOL/L
PROT SERPL-MCNC: 7.7 G/DL
PROTEIN URINE: NEGATIVE MG/DL
RBC # BLD: 4.99 M/UL
RBC # FLD: 13.8 %
SODIUM SERPL-SCNC: 141 MMOL/L
SPECIFIC GRAVITY URINE: 1.02
TRIGL SERPL-MCNC: 45 MG/DL
TSH SERPL-ACNC: 0.63 UIU/ML
URATE SERPL-MCNC: 2.5 MG/DL
UROBILINOGEN URINE: NEGATIVE MG/DL
WBC # FLD AUTO: 6.27 K/UL

## 2018-11-21 LAB
TESTOST BND SERPL-MCNC: 2.1 PG/ML
TESTOST SERPL-MCNC: 52.4 NG/DL

## 2018-12-05 ENCOUNTER — APPOINTMENT (OUTPATIENT)
Dept: DERMATOLOGY | Facility: CLINIC | Age: 38
End: 2018-12-05
Payer: MEDICAID

## 2018-12-05 PROCEDURE — 17107 DSTR CUT VSC PRLF LES10-50SQ: CPT

## 2018-12-05 PROCEDURE — 99213 OFFICE O/P EST LOW 20 MIN: CPT | Mod: 25

## 2018-12-05 PROCEDURE — 11900 INJECT SKIN LESIONS </W 7: CPT | Mod: 59

## 2019-02-01 ENCOUNTER — RX RENEWAL (OUTPATIENT)
Age: 39
End: 2019-02-01

## 2019-02-05 ENCOUNTER — MEDICATION RENEWAL (OUTPATIENT)
Age: 39
End: 2019-02-05

## 2019-02-06 ENCOUNTER — APPOINTMENT (OUTPATIENT)
Dept: DERMATOLOGY | Facility: CLINIC | Age: 39
End: 2019-02-06
Payer: MEDICAID

## 2019-02-06 PROCEDURE — 99213 OFFICE O/P EST LOW 20 MIN: CPT | Mod: 25,24

## 2019-02-06 PROCEDURE — 11900 INJECT SKIN LESIONS </W 7: CPT | Mod: 79

## 2019-02-20 ENCOUNTER — APPOINTMENT (OUTPATIENT)
Dept: INTERNAL MEDICINE | Facility: CLINIC | Age: 39
End: 2019-02-20
Payer: MEDICAID

## 2019-02-20 VITALS
HEART RATE: 80 BPM | OXYGEN SATURATION: 96 % | TEMPERATURE: 97.8 F | DIASTOLIC BLOOD PRESSURE: 70 MMHG | HEIGHT: 63 IN | WEIGHT: 184 LBS | SYSTOLIC BLOOD PRESSURE: 100 MMHG | BODY MASS INDEX: 32.6 KG/M2

## 2019-02-20 DIAGNOSIS — Z87.898 PERSONAL HISTORY OF OTHER SPECIFIED CONDITIONS: ICD-10-CM

## 2019-02-20 DIAGNOSIS — F43.0 ACUTE STRESS REACTION: ICD-10-CM

## 2019-02-20 PROCEDURE — 99214 OFFICE O/P EST MOD 30 MIN: CPT

## 2019-03-20 ENCOUNTER — APPOINTMENT (OUTPATIENT)
Dept: DERMATOLOGY | Facility: CLINIC | Age: 39
End: 2019-03-20
Payer: MEDICAID

## 2019-03-20 PROCEDURE — 11900 INJECT SKIN LESIONS </W 7: CPT

## 2019-03-20 PROCEDURE — 99213 OFFICE O/P EST LOW 20 MIN: CPT | Mod: 25

## 2019-05-01 ENCOUNTER — APPOINTMENT (OUTPATIENT)
Dept: DERMATOLOGY | Facility: CLINIC | Age: 39
End: 2019-05-01
Payer: COMMERCIAL

## 2019-05-01 PROCEDURE — 11104 PUNCH BX SKIN SINGLE LESION: CPT

## 2019-05-01 PROCEDURE — 99213 OFFICE O/P EST LOW 20 MIN: CPT | Mod: 25

## 2019-05-15 ENCOUNTER — APPOINTMENT (OUTPATIENT)
Dept: DERMATOLOGY | Facility: CLINIC | Age: 39
End: 2019-05-15
Payer: COMMERCIAL

## 2019-05-15 PROCEDURE — 99214 OFFICE O/P EST MOD 30 MIN: CPT

## 2019-05-29 ENCOUNTER — RX RENEWAL (OUTPATIENT)
Age: 39
End: 2019-05-29

## 2019-05-31 ENCOUNTER — CHART COPY (OUTPATIENT)
Age: 39
End: 2019-05-31

## 2019-06-03 ENCOUNTER — CHART COPY (OUTPATIENT)
Age: 39
End: 2019-06-03

## 2019-06-18 ENCOUNTER — MEDICATION RENEWAL (OUTPATIENT)
Age: 39
End: 2019-06-18

## 2019-06-18 ENCOUNTER — RX RENEWAL (OUTPATIENT)
Age: 39
End: 2019-06-18

## 2019-06-19 ENCOUNTER — APPOINTMENT (OUTPATIENT)
Dept: DERMATOLOGY | Facility: CLINIC | Age: 39
End: 2019-06-19

## 2019-07-03 ENCOUNTER — APPOINTMENT (OUTPATIENT)
Dept: INTERNAL MEDICINE | Facility: CLINIC | Age: 39
End: 2019-07-03
Payer: COMMERCIAL

## 2019-07-03 VITALS
HEIGHT: 63 IN | TEMPERATURE: 98.1 F | OXYGEN SATURATION: 98 % | DIASTOLIC BLOOD PRESSURE: 80 MMHG | HEART RATE: 84 BPM | SYSTOLIC BLOOD PRESSURE: 124 MMHG

## 2019-07-03 DIAGNOSIS — R63.5 ABNORMAL WEIGHT GAIN: ICD-10-CM

## 2019-07-03 PROCEDURE — 36415 COLL VENOUS BLD VENIPUNCTURE: CPT

## 2019-07-03 PROCEDURE — 99214 OFFICE O/P EST MOD 30 MIN: CPT | Mod: 25

## 2019-07-08 LAB — TSH SERPL-ACNC: 0.67 UIU/ML

## 2019-07-16 ENCOUNTER — APPOINTMENT (OUTPATIENT)
Dept: INTERNAL MEDICINE | Facility: CLINIC | Age: 39
End: 2019-07-16
Payer: COMMERCIAL

## 2019-07-16 VITALS
RESPIRATION RATE: 16 BRPM | WEIGHT: 184 LBS | TEMPERATURE: 98.2 F | HEART RATE: 76 BPM | SYSTOLIC BLOOD PRESSURE: 120 MMHG | DIASTOLIC BLOOD PRESSURE: 68 MMHG | BODY MASS INDEX: 32.59 KG/M2 | OXYGEN SATURATION: 98 %

## 2019-07-16 PROCEDURE — 99214 OFFICE O/P EST MOD 30 MIN: CPT

## 2019-08-27 ENCOUNTER — APPOINTMENT (OUTPATIENT)
Dept: CARDIOLOGY | Facility: CLINIC | Age: 39
End: 2019-08-27

## 2019-09-13 ENCOUNTER — RX RENEWAL (OUTPATIENT)
Age: 39
End: 2019-09-13

## 2019-09-14 ENCOUNTER — MEDICATION RENEWAL (OUTPATIENT)
Age: 39
End: 2019-09-14

## 2019-09-17 ENCOUNTER — APPOINTMENT (OUTPATIENT)
Dept: INTERNAL MEDICINE | Facility: CLINIC | Age: 39
End: 2019-09-17
Payer: COMMERCIAL

## 2019-09-17 VITALS
WEIGHT: 186 LBS | HEIGHT: 63 IN | HEART RATE: 101 BPM | DIASTOLIC BLOOD PRESSURE: 70 MMHG | BODY MASS INDEX: 32.96 KG/M2 | OXYGEN SATURATION: 97 % | SYSTOLIC BLOOD PRESSURE: 110 MMHG | TEMPERATURE: 98.6 F

## 2019-09-17 DIAGNOSIS — L73.9 FOLLICULAR DISORDER, UNSPECIFIED: ICD-10-CM

## 2019-09-17 PROCEDURE — 99214 OFFICE O/P EST MOD 30 MIN: CPT

## 2019-09-17 NOTE — HISTORY OF PRESENT ILLNESS
[FreeTextEntry8] : cc axillary mass\par \par pt reports that about 4 days ago she noticed a lump in the right axilla. She had shaved before this started and she noticed the next day that it started to grow and became painful. She has not noticed any other symptoms;n There has not been any fever or chills. She has not done anything on it. She did try an antibiotic ointment. the pain is moderate to severe. There are no other associated s/s. It feels better when there is no pressure on the area. it si worse putting pressure on it or moving it.

## 2019-09-17 NOTE — REVIEW OF SYSTEMS
[Fever] : no fever [Chills] : no chills [Headache] : no headache [Dizziness] : no dizziness [Negative] : Heme/Lymph [de-identified] : swelling and tenderness in the right axilla

## 2019-10-02 ENCOUNTER — APPOINTMENT (OUTPATIENT)
Dept: INTERNAL MEDICINE | Facility: CLINIC | Age: 39
End: 2019-10-02

## 2019-12-06 ENCOUNTER — RX RENEWAL (OUTPATIENT)
Age: 39
End: 2019-12-06

## 2019-12-16 ENCOUNTER — MEDICATION RENEWAL (OUTPATIENT)
Age: 39
End: 2019-12-16

## 2020-01-22 ENCOUNTER — APPOINTMENT (OUTPATIENT)
Dept: INTERNAL MEDICINE | Facility: CLINIC | Age: 40
End: 2020-01-22

## 2020-04-03 ENCOUNTER — APPOINTMENT (OUTPATIENT)
Dept: INTERNAL MEDICINE | Facility: CLINIC | Age: 40
End: 2020-04-03
Payer: COMMERCIAL

## 2020-04-03 VITALS
DIASTOLIC BLOOD PRESSURE: 70 MMHG | HEIGHT: 63 IN | SYSTOLIC BLOOD PRESSURE: 120 MMHG | WEIGHT: 178 LBS | HEART RATE: 100 BPM | BODY MASS INDEX: 31.54 KG/M2 | TEMPERATURE: 98.1 F | OXYGEN SATURATION: 95 %

## 2020-04-03 PROCEDURE — 99214 OFFICE O/P EST MOD 30 MIN: CPT | Mod: 25

## 2020-04-03 PROCEDURE — 36415 COLL VENOUS BLD VENIPUNCTURE: CPT

## 2020-04-04 LAB
T3 SERPL-MCNC: 78 NG/DL
T4 FREE SERPL-MCNC: 1.6 NG/DL
T4 SERPL-MCNC: 10.6 UG/DL
TSH SERPL-ACNC: 0.77 UIU/ML

## 2020-04-04 RX ORDER — CEPHALEXIN 500 MG/1
500 TABLET ORAL EVERY 8 HOURS
Qty: 30 | Refills: 0 | Status: DISCONTINUED | COMMUNITY
Start: 2019-09-17 | End: 2020-04-04

## 2020-04-04 RX ORDER — SULFAMETHOXAZOLE AND TRIMETHOPRIM 800; 160 MG/1; MG/1
800-160 TABLET ORAL TWICE DAILY
Qty: 20 | Refills: 1 | Status: DISCONTINUED | COMMUNITY
Start: 2019-09-17 | End: 2020-04-04

## 2020-04-04 RX ORDER — PREDNISONE 5 MG/1
5 TABLET ORAL
Qty: 36 | Refills: 1 | Status: DISCONTINUED | COMMUNITY
Start: 2019-07-16 | End: 2020-04-04

## 2020-04-23 ENCOUNTER — RX RENEWAL (OUTPATIENT)
Age: 40
End: 2020-04-23

## 2020-05-06 ENCOUNTER — APPOINTMENT (OUTPATIENT)
Dept: INTERNAL MEDICINE | Facility: CLINIC | Age: 40
End: 2020-05-06
Payer: COMMERCIAL

## 2020-05-06 VITALS
DIASTOLIC BLOOD PRESSURE: 70 MMHG | TEMPERATURE: 97.7 F | SYSTOLIC BLOOD PRESSURE: 120 MMHG | OXYGEN SATURATION: 97 % | HEIGHT: 63 IN | HEART RATE: 72 BPM

## 2020-05-06 PROCEDURE — 99214 OFFICE O/P EST MOD 30 MIN: CPT

## 2020-05-06 NOTE — HISTORY OF PRESENT ILLNESS
[FreeTextEntry1] : f/u to allergies, constipation, hypothyroidism, and back pain\par tinnitus left > right [de-identified] : pt allergies have been bothering her. The medications have been helping and she will continue what she is taking. The Constipation is better with the Metamucil. the thyroid was tested and the numbers are good at this time.

## 2020-05-06 NOTE — PHYSICAL EXAM
[No Acute Distress] : no acute distress [Normal Sclera/Conjunctiva] : normal sclera/conjunctiva [Normal Outer Ear/Nose] : the outer ears and nose were normal in appearance [No JVD] : no jugular venous distention [No Respiratory Distress] : no respiratory distress  [No Accessory Muscle Use] : no accessory muscle use [de-identified] : no tremors

## 2020-05-06 NOTE — ASSESSMENT
[FreeTextEntry1] : Pt is doing well at this time.  She will continue the allergy medication as before and she will continue her thyroid medication.   She will continue the metamucil as ordered.

## 2020-07-10 ENCOUNTER — APPOINTMENT (OUTPATIENT)
Dept: INTERNAL MEDICINE | Facility: CLINIC | Age: 40
End: 2020-07-10

## 2020-08-02 ENCOUNTER — RX RENEWAL (OUTPATIENT)
Age: 40
End: 2020-08-02

## 2020-09-01 ENCOUNTER — NON-APPOINTMENT (OUTPATIENT)
Age: 40
End: 2020-09-01

## 2020-09-02 ENCOUNTER — APPOINTMENT (OUTPATIENT)
Dept: INTERNAL MEDICINE | Facility: CLINIC | Age: 40
End: 2020-09-02
Payer: COMMERCIAL

## 2020-09-02 ENCOUNTER — LABORATORY RESULT (OUTPATIENT)
Age: 40
End: 2020-09-02

## 2020-09-02 ENCOUNTER — NON-APPOINTMENT (OUTPATIENT)
Age: 40
End: 2020-09-02

## 2020-09-02 VITALS
HEART RATE: 67 BPM | TEMPERATURE: 97.9 F | HEIGHT: 63 IN | WEIGHT: 172 LBS | BODY MASS INDEX: 30.48 KG/M2 | SYSTOLIC BLOOD PRESSURE: 90 MMHG | OXYGEN SATURATION: 98 % | DIASTOLIC BLOOD PRESSURE: 70 MMHG

## 2020-09-02 DIAGNOSIS — J32.9 CHRONIC SINUSITIS, UNSPECIFIED: ICD-10-CM

## 2020-09-02 DIAGNOSIS — Z87.898 PERSONAL HISTORY OF OTHER SPECIFIED CONDITIONS: ICD-10-CM

## 2020-09-02 DIAGNOSIS — Z87.42 PERSONAL HISTORY OF OTHER DISEASES OF THE FEMALE GENITAL TRACT: ICD-10-CM

## 2020-09-02 DIAGNOSIS — Z87.891 PERSONAL HISTORY OF NICOTINE DEPENDENCE: ICD-10-CM

## 2020-09-02 DIAGNOSIS — T14.8XXA OTHER INJURY OF UNSPECIFIED BODY REGION, INITIAL ENCOUNTER: ICD-10-CM

## 2020-09-02 DIAGNOSIS — M25.572 PAIN IN RIGHT ANKLE AND JOINTS OF RIGHT FOOT: ICD-10-CM

## 2020-09-02 DIAGNOSIS — R05 COUGH: ICD-10-CM

## 2020-09-02 DIAGNOSIS — R26.2 DIFFICULTY IN WALKING, NOT ELSEWHERE CLASSIFIED: ICD-10-CM

## 2020-09-02 DIAGNOSIS — M25.571 PAIN IN RIGHT ANKLE AND JOINTS OF RIGHT FOOT: ICD-10-CM

## 2020-09-02 DIAGNOSIS — K59.04 CHRONIC IDIOPATHIC CONSTIPATION: ICD-10-CM

## 2020-09-02 DIAGNOSIS — Z11.59 ENCOUNTER FOR SCREENING FOR OTHER VIRAL DISEASES: ICD-10-CM

## 2020-09-02 PROCEDURE — 99213 OFFICE O/P EST LOW 20 MIN: CPT | Mod: 25

## 2020-09-02 PROCEDURE — 99395 PREV VISIT EST AGE 18-39: CPT | Mod: 25

## 2020-09-02 PROCEDURE — 36415 COLL VENOUS BLD VENIPUNCTURE: CPT

## 2020-09-02 PROCEDURE — 92552 PURE TONE AUDIOMETRY AIR: CPT

## 2020-09-02 PROCEDURE — 93000 ELECTROCARDIOGRAM COMPLETE: CPT

## 2020-09-08 LAB
24R-OH-CALCIDIOL SERPL-MCNC: 49.6 PG/ML
ALBUMIN SERPL ELPH-MCNC: 4.5 G/DL
ALP BLD-CCNC: 77 U/L
ALT SERPL-CCNC: 11 U/L
ANION GAP SERPL CALC-SCNC: 9 MMOL/L
APPEARANCE: ABNORMAL
AST SERPL-CCNC: 19 U/L
BASOPHILS # BLD AUTO: 0.04 K/UL
BASOPHILS NFR BLD AUTO: 0.6 %
BILIRUB SERPL-MCNC: 0.2 MG/DL
BILIRUBIN URINE: NEGATIVE
BLOOD URINE: NEGATIVE
BUN SERPL-MCNC: 14 MG/DL
CALCIUM SERPL-MCNC: 8.9 MG/DL
CHLORIDE SERPL-SCNC: 106 MMOL/L
CHOLEST SERPL-MCNC: 177 MG/DL
CHOLEST/HDLC SERPL: 2.7 RATIO
CK SERPL-CCNC: 96 U/L
CO2 SERPL-SCNC: 26 MMOL/L
COLOR: YELLOW
CREAT SERPL-MCNC: 0.82 MG/DL
CREAT SPEC-SCNC: 170 MG/DL
EOSINOPHIL # BLD AUTO: 0.08 K/UL
EOSINOPHIL NFR BLD AUTO: 1.2 %
ESTIMATED AVERAGE GLUCOSE: 111 MG/DL
GLUCOSE QUALITATIVE U: NEGATIVE
GLUCOSE SERPL-MCNC: 84 MG/DL
HBA1C MFR BLD HPLC: 5.5 %
HCT VFR BLD CALC: 45.5 %
HDLC SERPL-MCNC: 65 MG/DL
HGB BLD-MCNC: 14.5 G/DL
IMM GRANULOCYTES NFR BLD AUTO: 0.2 %
KETONES URINE: NEGATIVE
LDLC SERPL CALC-MCNC: 104 MG/DL
LEUKOCYTE ESTERASE URINE: NEGATIVE
LYMPHOCYTES # BLD AUTO: 2.22 K/UL
LYMPHOCYTES NFR BLD AUTO: 34.3 %
MAN DIFF?: NORMAL
MCHC RBC-ENTMCNC: 30.7 PG
MCHC RBC-ENTMCNC: 31.9 GM/DL
MCV RBC AUTO: 96.2 FL
MICROALBUMIN 24H UR DL<=1MG/L-MCNC: 3.6 MG/DL
MICROALBUMIN/CREAT 24H UR-RTO: 21 MG/G
MONOCYTES # BLD AUTO: 0.52 K/UL
MONOCYTES NFR BLD AUTO: 8 %
NEUTROPHILS # BLD AUTO: 3.61 K/UL
NEUTROPHILS NFR BLD AUTO: 55.7 %
NITRITE URINE: NEGATIVE
PH URINE: 6
PLATELET # BLD AUTO: 219 K/UL
POTASSIUM SERPL-SCNC: 4.5 MMOL/L
PROT SERPL-MCNC: 6.9 G/DL
PROTEIN URINE: NORMAL
RBC # BLD: 4.73 M/UL
RBC # FLD: 14.1 %
SARS-COV-2 IGG SERPL IA-ACNC: 0.03 INDEX
SARS-COV-2 IGG SERPL QL IA: NEGATIVE
SODIUM SERPL-SCNC: 140 MMOL/L
SPECIFIC GRAVITY URINE: 1.03
T3 SERPL-MCNC: 85 NG/DL
T4 FREE SERPL-MCNC: 1.7 NG/DL
T4 SERPL-MCNC: 10.9 UG/DL
TRIGL SERPL-MCNC: 43 MG/DL
TSH SERPL-ACNC: 0.31 UIU/ML
URATE SERPL-MCNC: 2.5 MG/DL
UROBILINOGEN URINE: NORMAL
WBC # FLD AUTO: 6.48 K/UL

## 2020-09-09 ENCOUNTER — NON-APPOINTMENT (OUTPATIENT)
Age: 40
End: 2020-09-09

## 2020-09-09 ENCOUNTER — APPOINTMENT (OUTPATIENT)
Dept: CARDIOLOGY | Facility: CLINIC | Age: 40
End: 2020-09-09
Payer: COMMERCIAL

## 2020-09-09 VITALS
HEART RATE: 79 BPM | OXYGEN SATURATION: 99 % | TEMPERATURE: 98.2 F | SYSTOLIC BLOOD PRESSURE: 116 MMHG | WEIGHT: 171 LBS | DIASTOLIC BLOOD PRESSURE: 78 MMHG | BODY MASS INDEX: 30.29 KG/M2

## 2020-09-09 PROCEDURE — 99215 OFFICE O/P EST HI 40 MIN: CPT

## 2020-09-09 PROCEDURE — 93000 ELECTROCARDIOGRAM COMPLETE: CPT

## 2020-09-09 NOTE — CARDIOLOGY SUMMARY
[___] : [unfilled] [LVEF ___%] : LVEF [unfilled]% [Normal] : normal LA size [Mild] : mild pulmonary hypertension [Moderate] : moderate mitral regurgitation

## 2020-09-09 NOTE — PHYSICAL EXAM
[Normal Appearance] : normal appearance [General Appearance - Well Developed] : well developed [General Appearance - Well Nourished] : well nourished [Normal Conjunctiva] : the conjunctiva exhibited no abnormalities [Normal Oral Mucosa] : normal oral mucosa [Normal Oropharynx] : normal oropharynx [Exaggerated Use Of Accessory Muscles For Inspiration] : no accessory muscle use [Respiration, Rhythm And Depth] : normal respiratory rhythm and effort [] : no respiratory distress [Heart Rate And Rhythm] : heart rate and rhythm were normal [Auscultation Breath Sounds / Voice Sounds] : lungs were clear to auscultation bilaterally [Arterial Pulses Normal] : the arterial pulses were normal [Murmurs] : no murmurs present [Bowel Sounds] : normal bowel sounds [Abdomen Soft] : soft [Abnormal Walk] : normal gait [Nail Clubbing] : no clubbing of the fingernails [Abdomen Tenderness] : non-tender [Skin Color & Pigmentation] : normal skin color and pigmentation [Skin Turgor] : normal skin turgor [Cyanosis, Localized] : no localized cyanosis [Oriented To Time, Place, And Person] : oriented to person, place, and time [Affect] : the affect was normal [FreeTextEntry1] : No systolic murmur, loud P2 component

## 2020-09-09 NOTE — ADDENDUM
[FreeTextEntry1] : Patient is low risk for perioperative cardiac events and there is no contraindications to proceeding with surgery . No need for antibiotics for moderate MR and mitral valve prolapse prior to surgery.

## 2020-09-09 NOTE — HISTORY OF PRESENT ILLNESS
[FreeTextEntry1] : 36 year old female with a history of goiter/hyperthyroidism s/p thyroidectomy in 2013 now on synthroid therapy. I follow her for mitral valve prolapse with moderate MR ( A1and A2 prolapse ) with mild to moderate TR and  no pulmonary hypertension on last echo and preserved  EF.  She c/o of SOB FC I symptoms but stable , no orthopnea or PND, no LE edema. No dizziness, palpitations or syncope.\par She sometimes c/o of panic attacks ( tingling and numbness of Left LE and UE , head clouding , heaviness  overall) \par \par 9/9/2020 \par Has not been seen for 2 years\par c/o of SOB on more than ordinary effort ( FC I) \par Gained weight then lost it again \par c/o of palpitations at night, feeling  like her heart stops and restart again , happened twice not associated with dizziness or syncope \par c/o being fatigued at times\par

## 2020-09-09 NOTE — ASSESSMENT
[FreeTextEntry1] : Mitral valve prolapse with A2-A3 redundant and thickened flail A2 scallop. FC I  , moderate MR in 2018   will obtain an echo to follow severity of MR  , no pulmonary hypertension on last echo in 8/2017\par \par \par non specific T wave changes on EKG, no ischemic symptoms  \par \par Neurological symptoms, prior neurological workup has been negative. \par s/p thyroidectomy on Synthroid.\par \par follow up in 9 months.or earlier if worsening symptoms \par \par

## 2020-09-09 NOTE — DISCUSSION/SUMMARY
[Moderate Mitral Regurgitation] : moderate mitral regurgitation [Echocardiogram] : echocardiogram [Compensated] : compensated [None] : none [Patient] : the patient

## 2020-10-09 ENCOUNTER — APPOINTMENT (OUTPATIENT)
Dept: CARDIOLOGY | Facility: CLINIC | Age: 40
End: 2020-10-09
Payer: COMMERCIAL

## 2020-10-09 PROCEDURE — 93306 TTE W/DOPPLER COMPLETE: CPT

## 2020-10-27 ENCOUNTER — NON-APPOINTMENT (OUTPATIENT)
Age: 40
End: 2020-10-27

## 2020-10-27 ENCOUNTER — TRANSCRIPTION ENCOUNTER (OUTPATIENT)
Age: 40
End: 2020-10-27

## 2020-10-28 ENCOUNTER — RX RENEWAL (OUTPATIENT)
Age: 40
End: 2020-10-28

## 2020-11-04 ENCOUNTER — RX RENEWAL (OUTPATIENT)
Age: 40
End: 2020-11-04

## 2021-01-27 ENCOUNTER — RX RENEWAL (OUTPATIENT)
Age: 41
End: 2021-01-27

## 2021-02-21 ENCOUNTER — EMERGENCY (EMERGENCY)
Facility: HOSPITAL | Age: 41
LOS: 1 days | Discharge: HOME | End: 2021-02-21
Payer: COMMERCIAL

## 2021-02-21 VITALS
SYSTOLIC BLOOD PRESSURE: 114 MMHG | HEIGHT: 64 IN | TEMPERATURE: 98 F | DIASTOLIC BLOOD PRESSURE: 65 MMHG | RESPIRATION RATE: 17 BRPM | HEART RATE: 73 BPM | OXYGEN SATURATION: 98 %

## 2021-02-21 DIAGNOSIS — E89.0 POSTPROCEDURAL HYPOTHYROIDISM: Chronic | ICD-10-CM

## 2021-02-21 LAB — SARS-COV-2 RNA SPEC QL NAA+PROBE: SIGNIFICANT CHANGE UP

## 2021-02-21 PROCEDURE — U0003: CPT

## 2021-02-21 PROCEDURE — 99282 EMERGENCY DEPT VISIT SF MDM: CPT

## 2021-02-21 PROCEDURE — U0005: CPT

## 2021-02-21 PROCEDURE — 99283 EMERGENCY DEPT VISIT LOW MDM: CPT

## 2021-02-21 NOTE — ED PROVIDER NOTE - CLINICAL SUMMARY MEDICAL DECISION MAKING FREE TEXT BOX
41 y/o F asymptomatic with + exposure to covid-19. Well appearing. Pt nontoxic appearing, stable vitals, ambulatory with stable saturation without supplemental oxygen. PT advised about self-quarantine instructions until negative test results and/or symptom resolution. pt advised on hand hygiene, monitoring of symptoms, antipyretic use as well as and fu with primary care provider. Instructions given in pre-printed copy.

## 2021-02-21 NOTE — ED PROVIDER NOTE - PATIENT PORTAL LINK FT
You can access the FollowMyHealth Patient Portal offered by [facility] by registering at the following website: [email or web address]. By joining [employer]’s FollowMyHealth portal, you will also be able to view your health information using other applications (apps) compatible with our system.

## 2021-02-22 ENCOUNTER — NON-APPOINTMENT (OUTPATIENT)
Age: 41
End: 2021-02-22

## 2021-02-27 ENCOUNTER — NON-APPOINTMENT (OUTPATIENT)
Age: 41
End: 2021-02-27

## 2021-02-27 ENCOUNTER — APPOINTMENT (OUTPATIENT)
Dept: INTERNAL MEDICINE | Facility: CLINIC | Age: 41
End: 2021-02-27
Payer: COMMERCIAL

## 2021-02-27 VITALS
DIASTOLIC BLOOD PRESSURE: 80 MMHG | OXYGEN SATURATION: 98 % | HEART RATE: 90 BPM | WEIGHT: 169 LBS | BODY MASS INDEX: 29.95 KG/M2 | TEMPERATURE: 97.7 F | SYSTOLIC BLOOD PRESSURE: 110 MMHG | HEIGHT: 63 IN

## 2021-02-27 DIAGNOSIS — J32.9 CHRONIC SINUSITIS, UNSPECIFIED: ICD-10-CM

## 2021-02-27 DIAGNOSIS — J30.9 ALLERGIC RHINITIS, UNSPECIFIED: ICD-10-CM

## 2021-02-27 DIAGNOSIS — Z20.822 CONTACT WITH AND (SUSPECTED) EXPOSURE TO COVID-19: ICD-10-CM

## 2021-02-27 PROCEDURE — 99214 OFFICE O/P EST MOD 30 MIN: CPT | Mod: 25

## 2021-02-27 PROCEDURE — 99072 ADDL SUPL MATRL&STAF TM PHE: CPT

## 2021-03-01 ENCOUNTER — NON-APPOINTMENT (OUTPATIENT)
Age: 41
End: 2021-03-01

## 2021-03-01 LAB
SARS-COV-2 N GENE NPH QL NAA+PROBE: NOT DETECTED
TSH SERPL-ACNC: 0.46 UIU/ML

## 2021-03-13 ENCOUNTER — APPOINTMENT (OUTPATIENT)
Dept: INTERNAL MEDICINE | Facility: CLINIC | Age: 41
End: 2021-03-13
Payer: COMMERCIAL

## 2021-03-13 VITALS
HEART RATE: 73 BPM | OXYGEN SATURATION: 97 % | WEIGHT: 169 LBS | BODY MASS INDEX: 29.95 KG/M2 | DIASTOLIC BLOOD PRESSURE: 70 MMHG | HEIGHT: 63 IN | TEMPERATURE: 97.9 F | SYSTOLIC BLOOD PRESSURE: 100 MMHG

## 2021-03-13 PROCEDURE — 99072 ADDL SUPL MATRL&STAF TM PHE: CPT

## 2021-03-13 PROCEDURE — 99214 OFFICE O/P EST MOD 30 MIN: CPT

## 2021-04-15 ENCOUNTER — RESULT REVIEW (OUTPATIENT)
Age: 41
End: 2021-04-15

## 2021-04-15 ENCOUNTER — OUTPATIENT (OUTPATIENT)
Dept: OUTPATIENT SERVICES | Facility: HOSPITAL | Age: 41
LOS: 1 days | End: 2021-04-15
Payer: COMMERCIAL

## 2021-04-15 DIAGNOSIS — E89.0 POSTPROCEDURAL HYPOTHYROIDISM: Chronic | ICD-10-CM

## 2021-04-15 DIAGNOSIS — R13.10 DYSPHAGIA, UNSPECIFIED: ICD-10-CM

## 2021-04-15 PROCEDURE — 74230 X-RAY XM SWLNG FUNCJ C+: CPT

## 2021-04-15 PROCEDURE — 92611 MOTION FLUOROSCOPY/SWALLOW: CPT

## 2021-04-15 PROCEDURE — 74230 X-RAY XM SWLNG FUNCJ C+: CPT | Mod: 26

## 2021-04-15 NOTE — SWALLOW VFSS/MBS ASSESSMENT ADULT - ROSENBEK'S PENETRATION ASPIRATION SCALE
(1) no aspiration, contrast does not enter airway :inconsistent/(2) contrast enters airway, remains above the vocal cords, no residue remains (penetration)

## 2021-04-15 NOTE — SWALLOW VFSS/MBS ASSESSMENT ADULT - SLP GENERAL OBSERVATIONS
Pt seen for MBS, seated upright in chair, pleasant & cooperative, baseline throat clearing, 0/10 pain Pt seen for MBS, seated upright in chair, pleasant & cooperative, baseline throat clearing prior to & t/o study, 0/10 pain

## 2021-04-15 NOTE — SWALLOW VFSS/MBS ASSESSMENT ADULT - DIAGNOSTIC IMPRESSIONS
Oral & pharyngeal stage of swallow grossly WFL. Pharyngeal stage grossly WFL. Pt with inconsistent trace penetration during the swallow with complete & spontaneous retrieval, for thin fluids.

## 2021-04-15 NOTE — SWALLOW VFSS/MBS ASSESSMENT ADULT - SLP PERTINENT HISTORY OF CURRENT PROBLEM
Pt reports sensation of "throat closing" sometimes when talking pills & eating solids, Pt reports sensation of "throat closing" sometimes when talking pills & eating solids

## 2021-05-05 ENCOUNTER — TRANSCRIPTION ENCOUNTER (OUTPATIENT)
Age: 41
End: 2021-05-05

## 2021-05-06 ENCOUNTER — NON-APPOINTMENT (OUTPATIENT)
Age: 41
End: 2021-05-06

## 2021-05-11 ENCOUNTER — APPOINTMENT (OUTPATIENT)
Dept: DERMATOLOGY | Facility: CLINIC | Age: 41
End: 2021-05-11

## 2021-05-17 ENCOUNTER — TRANSCRIPTION ENCOUNTER (OUTPATIENT)
Age: 41
End: 2021-05-17

## 2021-05-18 ENCOUNTER — NON-APPOINTMENT (OUTPATIENT)
Age: 41
End: 2021-05-18

## 2021-06-07 ENCOUNTER — APPOINTMENT (OUTPATIENT)
Dept: INTERNAL MEDICINE | Facility: CLINIC | Age: 41
End: 2021-06-07

## 2021-06-11 ENCOUNTER — APPOINTMENT (OUTPATIENT)
Dept: CARDIOLOGY | Facility: CLINIC | Age: 41
End: 2021-06-11

## 2021-07-20 ENCOUNTER — TRANSCRIPTION ENCOUNTER (OUTPATIENT)
Age: 41
End: 2021-07-20

## 2021-07-26 ENCOUNTER — NON-APPOINTMENT (OUTPATIENT)
Age: 41
End: 2021-07-26

## 2021-08-11 ENCOUNTER — APPOINTMENT (OUTPATIENT)
Dept: CARDIOLOGY | Facility: CLINIC | Age: 41
End: 2021-08-11
Payer: COMMERCIAL

## 2021-08-11 ENCOUNTER — NON-APPOINTMENT (OUTPATIENT)
Age: 41
End: 2021-08-11

## 2021-08-11 VITALS
RESPIRATION RATE: 17 BRPM | OXYGEN SATURATION: 98 % | HEIGHT: 63 IN | SYSTOLIC BLOOD PRESSURE: 114 MMHG | HEART RATE: 102 BPM | DIASTOLIC BLOOD PRESSURE: 74 MMHG | TEMPERATURE: 98.2 F | WEIGHT: 172 LBS | BODY MASS INDEX: 30.48 KG/M2

## 2021-08-11 PROCEDURE — 93000 ELECTROCARDIOGRAM COMPLETE: CPT

## 2021-08-11 PROCEDURE — 99214 OFFICE O/P EST MOD 30 MIN: CPT

## 2021-08-11 PROCEDURE — 99215 OFFICE O/P EST HI 40 MIN: CPT

## 2021-08-11 RX ORDER — NAPROXEN 500 MG/1
500 TABLET ORAL
Qty: 60 | Refills: 2 | Status: DISCONTINUED | COMMUNITY
Start: 2019-07-03 | End: 2021-08-11

## 2021-08-11 RX ORDER — METHYLPREDNISOLONE 4 MG/1
4 TABLET ORAL
Qty: 1 | Refills: 1 | Status: DISCONTINUED | COMMUNITY
Start: 2021-02-27 | End: 2021-08-11

## 2021-08-11 NOTE — PHYSICAL EXAM
[General Appearance - Well Developed] : well developed [Normal Appearance] : normal appearance [General Appearance - Well Nourished] : well nourished [Normal Conjunctiva] : the conjunctiva exhibited no abnormalities [Normal Oral Mucosa] : normal oral mucosa [Normal Oropharynx] : normal oropharynx [] : no respiratory distress [Respiration, Rhythm And Depth] : normal respiratory rhythm and effort [Exaggerated Use Of Accessory Muscles For Inspiration] : no accessory muscle use [Auscultation Breath Sounds / Voice Sounds] : lungs were clear to auscultation bilaterally [Heart Rate And Rhythm] : heart rate and rhythm were normal [Murmurs] : no murmurs present [Arterial Pulses Normal] : the arterial pulses were normal [Abnormal Walk] : normal gait [Nail Clubbing] : no clubbing of the fingernails [Cyanosis, Localized] : no localized cyanosis [Oriented To Time, Place, And Person] : oriented to person, place, and time [Affect] : the affect was normal [FreeTextEntry1] : No systolic murmur, loud P2 component

## 2021-08-11 NOTE — ASSESSMENT
[FreeTextEntry1] : Mitral valve prolapse with A2-A3 redundant and thickened flail A2 scallop. FC I  , moderate MR in 2020  , EF 45-50% \par need to confirm that there is no severe MR, especially with EF 45% now \par will order TOREY to evaluate valve morphology and severity of MR, \par \par Follow up based on TOREY results \par \par

## 2021-08-11 NOTE — HISTORY OF PRESENT ILLNESS
[FreeTextEntry1] : 36 year old female with a history of goiter/hyperthyroidism s/p thyroidectomy in 2013 now on synthroid therapy. I follow her for mitral valve prolapse with moderate MR ( A1and A2 prolapse ) with mild to moderate TR and  no pulmonary hypertension on last echo and preserved  EF.  She c/o of SOB FC I symptoms but stable , no orthopnea or PND, no LE edema. No dizziness, palpitations or syncope.\par She sometimes c/o of panic attacks ( tingling and numbness of Left LE and UE , head clouding , heaviness  overall) \par \par 9/9/2020 \par Has not been seen for 2 years\par c/o of SOB on more than ordinary effort ( FC I) \par Gained weight then lost it again \par c/o of palpitations at night, feeling  like her heart stops and restart again , happened twice not associated with dizziness or syncope \par c/o being fatigued at times\par \par 8/11/2021\par Seen last year\par Echo in 10/2020 showed moderate MR, MV prolapse , posteriorly directed MR. \par SOB, occasional , at any time , blamed on asthma most of the time \par c/o occasionally multiple symptoms, (fatigue/numbness) \par \par \par

## 2021-08-11 NOTE — CARDIOLOGY SUMMARY
[___] : [unfilled] [LVEF ___%] : LVEF [unfilled]% [Mild] : mild pulmonary hypertension [Normal] : normal LA size [Moderate] : moderate mitral regurgitation [de-identified] : NSr, diffuse non specific T wave changes

## 2021-09-04 DIAGNOSIS — Z01.818 ENCOUNTER FOR OTHER PREPROCEDURAL EXAMINATION: ICD-10-CM

## 2021-09-05 ENCOUNTER — APPOINTMENT (OUTPATIENT)
Dept: DISASTER EMERGENCY | Facility: CLINIC | Age: 41
End: 2021-09-05

## 2021-09-06 LAB — SARS-COV-2 N GENE NPH QL NAA+PROBE: NOT DETECTED

## 2021-09-08 ENCOUNTER — TRANSCRIPTION ENCOUNTER (OUTPATIENT)
Age: 41
End: 2021-09-08

## 2021-09-08 ENCOUNTER — OUTPATIENT (OUTPATIENT)
Dept: OUTPATIENT SERVICES | Facility: HOSPITAL | Age: 41
LOS: 1 days | End: 2021-09-08
Payer: COMMERCIAL

## 2021-09-08 VITALS
RESPIRATION RATE: 16 BRPM | SYSTOLIC BLOOD PRESSURE: 97 MMHG | HEART RATE: 63 BPM | OXYGEN SATURATION: 98 % | TEMPERATURE: 98 F | DIASTOLIC BLOOD PRESSURE: 97 MMHG

## 2021-09-08 VITALS — DIASTOLIC BLOOD PRESSURE: 67 MMHG | SYSTOLIC BLOOD PRESSURE: 97 MMHG | OXYGEN SATURATION: 99 % | HEART RATE: 66 BPM

## 2021-09-08 DIAGNOSIS — I34.1 NONRHEUMATIC MITRAL (VALVE) PROLAPSE: ICD-10-CM

## 2021-09-08 DIAGNOSIS — E89.0 POSTPROCEDURAL HYPOTHYROIDISM: Chronic | ICD-10-CM

## 2021-09-08 LAB
ANION GAP SERPL CALC-SCNC: 12 MMOL/L — SIGNIFICANT CHANGE UP (ref 5–17)
BUN SERPL-MCNC: 14.2 MG/DL — SIGNIFICANT CHANGE UP (ref 8–20)
CALCIUM SERPL-MCNC: 8.6 MG/DL — SIGNIFICANT CHANGE UP (ref 8.6–10.2)
CHLORIDE SERPL-SCNC: 106 MMOL/L — SIGNIFICANT CHANGE UP (ref 98–107)
CO2 SERPL-SCNC: 23 MMOL/L — SIGNIFICANT CHANGE UP (ref 22–29)
CREAT SERPL-MCNC: 0.79 MG/DL — SIGNIFICANT CHANGE UP (ref 0.5–1.3)
GLUCOSE SERPL-MCNC: 97 MG/DL — SIGNIFICANT CHANGE UP (ref 70–99)
HCG SERPL-ACNC: <4 MIU/ML — SIGNIFICANT CHANGE UP
HCT VFR BLD CALC: 43 % — SIGNIFICANT CHANGE UP (ref 34.5–45)
HGB BLD-MCNC: 14.2 G/DL — SIGNIFICANT CHANGE UP (ref 11.5–15.5)
MCHC RBC-ENTMCNC: 30.5 PG — SIGNIFICANT CHANGE UP (ref 27–34)
MCHC RBC-ENTMCNC: 33 GM/DL — SIGNIFICANT CHANGE UP (ref 32–36)
MCV RBC AUTO: 92.3 FL — SIGNIFICANT CHANGE UP (ref 80–100)
PLATELET # BLD AUTO: 209 K/UL — SIGNIFICANT CHANGE UP (ref 150–400)
POTASSIUM SERPL-MCNC: 3.9 MMOL/L — SIGNIFICANT CHANGE UP (ref 3.5–5.3)
POTASSIUM SERPL-SCNC: 3.9 MMOL/L — SIGNIFICANT CHANGE UP (ref 3.5–5.3)
RBC # BLD: 4.66 M/UL — SIGNIFICANT CHANGE UP (ref 3.8–5.2)
RBC # FLD: 12.9 % — SIGNIFICANT CHANGE UP (ref 10.3–14.5)
SODIUM SERPL-SCNC: 141 MMOL/L — SIGNIFICANT CHANGE UP (ref 135–145)
WBC # BLD: 7.38 K/UL — SIGNIFICANT CHANGE UP (ref 3.8–10.5)
WBC # FLD AUTO: 7.38 K/UL — SIGNIFICANT CHANGE UP (ref 3.8–10.5)

## 2021-09-08 PROCEDURE — 93010 ELECTROCARDIOGRAM REPORT: CPT

## 2021-09-08 PROCEDURE — 76376 3D RENDER W/INTRP POSTPROCES: CPT | Mod: 26

## 2021-09-08 PROCEDURE — 93005 ELECTROCARDIOGRAM TRACING: CPT

## 2021-09-08 PROCEDURE — 85027 COMPLETE CBC AUTOMATED: CPT

## 2021-09-08 PROCEDURE — 93325 DOPPLER ECHO COLOR FLOW MAPG: CPT

## 2021-09-08 PROCEDURE — 84702 CHORIONIC GONADOTROPIN TEST: CPT

## 2021-09-08 PROCEDURE — 80048 BASIC METABOLIC PNL TOTAL CA: CPT

## 2021-09-08 PROCEDURE — 93320 DOPPLER ECHO COMPLETE: CPT

## 2021-09-08 PROCEDURE — 93325 DOPPLER ECHO COLOR FLOW MAPG: CPT | Mod: 26

## 2021-09-08 PROCEDURE — 93312 ECHO TRANSESOPHAGEAL: CPT

## 2021-09-08 PROCEDURE — 36415 COLL VENOUS BLD VENIPUNCTURE: CPT

## 2021-09-08 PROCEDURE — 93312 ECHO TRANSESOPHAGEAL: CPT | Mod: 26

## 2021-09-08 PROCEDURE — 93320 DOPPLER ECHO COMPLETE: CPT | Mod: 26

## 2021-09-08 NOTE — PROGRESS NOTE ADULT - ASSESSMENT
A/P: 40 year old female with h/o goither/hypethyroidism s/p thyroidectomy 2013 with known MVR and mod MR who c/o increasing fatigue/SOB and chest pain.   Last echo 8/11/20 revealed Mod MR, MV prolapse, posteriorly directed MR.  Now s/p TOREY that revealed mod to severe MVP with normal EF (prelim report; official report to follow)  -Post procedure as per post TOREY orders  -If stable may discharge to home after one hour if tolerating PO intake and ambulating  -Follow up with Dr. Sheppard in one to two weeks  -Continue current med regimen  -Activity instructions discussed with patient verbal understanding

## 2021-09-08 NOTE — DISCHARGE NOTE PROVIDER - NSDCCPTREATMENT_GEN_ALL_CORE_FT
PRINCIPAL PROCEDURE  Procedure: Transesophageal echocardiogram (TOREY)  Findings and Treatment: Notify your doctor if you develop a fever, cough up blood, have any chest pain, palpitations or difficulty breathing. You may take a throat lozenge if you develop a sore throat or try warm salt water gargle. Resume your diet with soft foods first. Follow up with your cardiologist within 2 weeks for a follow up or sooner with any concerns. Report to the nearest ER with any emergencies.

## 2021-09-08 NOTE — DISCHARGE NOTE PROVIDER - CARE PROVIDER_API CALL
Stuart Sheppard)  Cardiovascular Disease  [address] [facility], [address] [place] [id number]  Phone: [phone]  Fax: [phone]  Established Patient  Follow Up Time: 2 weeks

## 2021-09-08 NOTE — H&P PST ADULT - NS SC CAGE ALCOHOL ANNOYED YOU
Here with a 2-week history of feeling off. Has had symptoms in the head like before we had him on blood pressure treatment. Just did not feel right. He does admit to some left-sided chest discomfort. Not a sharp pain so he was not concerned.   He also h left-sided chest pressure radiating to the left arm with abnormal EKG. Concerning for lateral ischemia. As he is symptom-free tonight I did not send him to the ER. Plans Lexiscan. Start aspirin 81 mg daily. Continue metoprolol HCT and fish oil.   We no

## 2021-09-08 NOTE — DISCHARGE NOTE PROVIDER - NSDCMRMEDTOKEN_GEN_ALL_CORE_FT
budesonide 32 mcg/inh nasal aerosol with adapter: nasal once a day  Synthroid 112 mcg (0.112 mg) oral tablet: 1 tab(s) orally once a day  ZyrTEC 10 mg oral tablet: 1 tab(s) orally once a day

## 2021-09-08 NOTE — DISCHARGE NOTE NURSING/CASE MANAGEMENT/SOCIAL WORK - PATIENT PORTAL LINK FT
You can access the FollowMyHealth Patient Portal offered by [facility] by registering at the following website: [email or web address]. By joining [employer]’s FollowMyHealth portal, you will also be able to view your health information using other applications (apps) compatible with our system.

## 2021-09-08 NOTE — DISCHARGE NOTE PROVIDER - NSDCACTIVITY_GEN_ALL_CORE
Do not drive or operate machinery/Showering allowed/Do not make important decisions/Stairs allowed/Walking - Indoors allowed/Walking - Outdoors allowed/Follow Instructions Provided by your Surgical Team

## 2021-09-08 NOTE — H&P PST ADULT - ASSESSMENT
40 year old female with known MVP with mod MR (A1 and A2 prolapse) with c/o of increased fatigue/SOB/CP with feelings of her "heart stopping while laying in bed".  For TOREY to assess MV.    -NPO status maintained

## 2021-09-08 NOTE — DISCHARGE NOTE PROVIDER - HOSPITAL COURSE
40 year old female with h/o goither/hypethyroidism s/p thyroidectomy 2013 with known MVR and mod MR who c/o increasing fatigue/SOB and chest pain.   Last echo 8/11/20 revealed Mod MR, MV prolapse, posteriorly directed MR.  Now s/p TOREY that revealed mod to severe MVP with normal EF (prelim report; official report to follow)  -Post procedure as per post TOREY orders  -If stable may discharge to home after one hour if tolerating PO intake and ambulating  -Follow up with Dr. Sheppard in one to two weeks  -Continue current med regimen  -Activity instructions discussed with patient verbal understanding

## 2021-09-08 NOTE — PROGRESS NOTE ADULT - SUBJECTIVE AND OBJECTIVE BOX
Cardiology NP post procedure note:    -s/p TOREY: mod to severe MVP; EF 55% (prelim report; official report to follow)      Allergies:  No Known Allergies    PAST MEDICAL & SURGICAL HISTORY:  Non-rheumatic tricuspid valve insufficiency    Mitral valve prolapse    Non-rheumatic mitral regurgitation    Goiter    Hyperthyroidism    Post-therapeutic hypothyroidism    Glaucoma    Graves&#x27; disease    History of thyroidectomy        Vital Signs Last 24 Hrs  T(C): 36.6 (08 Sep 2021 08:09), Max: 36.6 (08 Sep 2021 08:09)  T(F): 97.8 (08 Sep 2021 08:09), Max: 97.8 (08 Sep 2021 08:09)  HR: 63 (08 Sep 2021 08:09) (63 - 66)  BP: 97/67 (08 Sep 2021 08:09) (97/67 - 97/67)  BP(mean): 77 (08 Sep 2021 08:07) (77 - 77)  RR: 16 (08 Sep 2021 08:09) (16 - 16)  SpO2: 98% (08 Sep 2021 08:09) (98% - 99%)    Physical Exam:  Constitutional: NAD, AAOx3  Cardiovascular: +S1S2 RRR  Pulmonary: CTA b/l, unlabored  GI: soft NTND +BS  Extremities: no pedal edema, +distal pulses b/l  Neuro: non focal, HERRERA x4    LABS:                        14.2   7.38  )-----------( 209      ( 08 Sep 2021 08:07 )             43.0     09-08    141  |  106  |  14.2  ----------------------------<  97  3.9   |  23.0  |  0.79    Ca    8.6      08 Sep 2021 08:07            RADIOLOGY & ADDITIONAL TESTS:

## 2021-09-08 NOTE — H&P PST ADULT - NSICDXPASTMEDICALHX_GEN_ALL_CORE_FT
PAST MEDICAL HISTORY:  Glaucoma     Goiter     Graves' disease     Hyperthyroidism     Mitral valve prolapse     Non-rheumatic mitral regurgitation     Non-rheumatic tricuspid valve insufficiency     Post-therapeutic hypothyroidism

## 2021-09-08 NOTE — DISCHARGE NOTE PROVIDER - NSDCFUADDAPPT_GEN_ALL_CORE_FT
Follow up with Dr. Sheppard in one to two weeks    Notify your doctor if you develop a fever, cough up blood, have any chest pain, palpitations or difficulty breathing. You may take a throat lozenge if you develop a sore throat or try warm salt water gargle. Resume your diet with soft foods first. Follow up with your cardiologist within 2 weeks for a follow up or sooner with any concerns. Report to the nearest ER with any emergencies.

## 2021-09-08 NOTE — DISCHARGE NOTE PROVIDER - NSDCCPCAREPLAN_GEN_ALL_CORE_FT
PRINCIPAL DISCHARGE DIAGNOSIS  Diagnosis: MVP (mitral valve prolapse)  Assessment and Plan of Treatment:

## 2021-09-08 NOTE — H&P PST ADULT - NSICDXFAMILYHX_GEN_ALL_CORE_FT
FAMILY HISTORY:  Family history of neoplasm of brain    Grandparent  Still living? Unknown  Family history of breast cancer, Age at diagnosis: Age Unknown

## 2021-09-22 ENCOUNTER — TRANSCRIPTION ENCOUNTER (OUTPATIENT)
Age: 41
End: 2021-09-22

## 2021-10-15 ENCOUNTER — APPOINTMENT (OUTPATIENT)
Dept: ORTHOPEDIC SURGERY | Facility: CLINIC | Age: 41
End: 2021-10-15
Payer: COMMERCIAL

## 2021-10-15 VITALS
HEIGHT: 63 IN | BODY MASS INDEX: 30.48 KG/M2 | WEIGHT: 172 LBS | DIASTOLIC BLOOD PRESSURE: 81 MMHG | SYSTOLIC BLOOD PRESSURE: 126 MMHG | HEART RATE: 103 BPM

## 2021-10-15 DIAGNOSIS — L02.91 CUTANEOUS ABSCESS, UNSPECIFIED: ICD-10-CM

## 2021-10-15 PROCEDURE — 99204 OFFICE O/P NEW MOD 45 MIN: CPT

## 2021-10-15 PROCEDURE — 72100 X-RAY EXAM L-S SPINE 2/3 VWS: CPT

## 2021-10-15 PROCEDURE — 72040 X-RAY EXAM NECK SPINE 2-3 VW: CPT

## 2021-10-15 NOTE — ADDENDUM
[FreeTextEntry1] : Documented by Sherwin Nuno acting as a scribe for Laney Alfaro on 10/15/2021 . All medical record entries made by the Scribe were at my, Laney Alfaro, direction and personally dictated by me on 10/15/2021  . I have reviewed the chart and agree that the record accurately reflects my personal performance of the history, physical exam, assessment and plan. I have also personally directed, reviewed, and agreed with the chart.

## 2021-10-15 NOTE — HISTORY OF PRESENT ILLNESS
[de-identified] : 41 year old F presents for an initial evaluation of neck and back pain. Her neck has posterior aches and pains traveling transiently into the trapezial region. GRAHAM questionnaire negative. She is having worsening back pain as well, radiates across the bra line and radiates into the intercoastal. No pain, tingling, or numbness. She states she has a family hx of osteoarthritis.  [Ataxia] : no ataxia [Incontinence] : no incontinence [Loss of Dexterity] : good dexterity [Urinary Ret.] : no urinary retention

## 2021-10-15 NOTE — DISCUSSION/SUMMARY
[de-identified] : We talked about the nature of the condition and treatment options. Anticipatory guidance regarding radiculopathy. Patient has been referred to physical therapy for decreased pain modalities, stretching and strengthening modalities, soft tissue modalities, and physical modalities. Patient was provided a Rx for Naproxen Sodium 500Mg BID. We also spoke about the benefits of using heat, application of ice to the area 2-3x daily for 20 minutes after periods of activity, and Bengay cream. Patient was instructed to start home exercises, core strengthening and a sheet was provided. I have recommended that the patient continue with a conservative treatment plan such as chiropractics. Patient to follow up on a PRN basis unless pains persists. \par \par Prior to appointment and during encounter with patient extensive medical records were reviewed including but not limited to, hospital records, out patient records, imaging results, and lab data. During this appointment the patient was examined, diagnoses were discussed and explained in a face to face manner. In addition extensive time was spent reviewing aforementioned diagnostic studies. Counseling including abnormal image results, differential diagnoses, treatment options, risk and benefits, lifestyle changes, current condition, and current medications was performed. Patient's comments, questions, and concerns were address and patient verbalized understanding. Based on this patient's presentation at our office, which is an orthopedic spine surgeon's office, this patient inherently / intrinsically has a risk, however minute, of developing  issues such as Cauda equina syndrome, bowel and bladder changes, or progression of motor or neurological deficits such as paralysis which may be permanent.

## 2021-10-15 NOTE — PHYSICAL EXAM
[Obese] : obese [Poor Appearance] : well-appearing [Acute Distress] : not in acute distress [de-identified] : CONSTITUTIONAL: Patient is a very pleasant individual who is well-nourished and appears stated age. \par PSYCHIATRIC: Alert and oriented times three and in no apparent distress, and participates with orthopedic evaluation well.\par HEAD: Atraumatic and nonsyndromic in appearance.\par EENT: No thyromegaly, EOMI.\par RESPIRATORY: Respiratory rate is regular, not dyspneic on examination.\par LYMPHATICS: There is no cervical or axillary lymphadenopathy.\par INTEGUMENTARY: Skin is clean, dry, and intact about the bilateral upper extremities and cervical spine. \par VASCULAR: There is brisk capillary refill about the bilateral upper extremities and radial pulses are 2/4. \par NEUROLOGIC: Negative L'hirmitte, negative Spurling’s sign. There are no pathologic reflexes. There is no decrease in sensation of the bilateral upper extremities on Wartenberg pinwheel examination. Deep tendon reflexes are well-maintained at +2/4 of the bilateral upper extremities and are symmetric.\par MUSCULOSKELETAL: There is no visible muscular atrophy. Manual motor strength is well maintained in the bilateral upper extremities. Cervical range of motion is well maintained. The patient ambulates in a non-myelopathic manner. Normal secondary orthopaedic exam of bilateral shoulders, elbows and hands. Elbow flexion and extension, wrist extension, finger flexion and abduction are well maintained.  [de-identified] : Xray of a cervical spine taken 10/15/2021 demonstrates  C4-C6 degenerative changes. \par \par Xray of the lumbar spine taken 10/15/2021 demonstrates L5-S1 and T11 - L1 degenerative changes. \par \par MRI of the cervical spine taken 02- demonstrates C3-C4 and C4-C5 disc protrusion and anterior osteophytes.

## 2021-10-20 ENCOUNTER — NON-APPOINTMENT (OUTPATIENT)
Age: 41
End: 2021-10-20

## 2021-10-23 ENCOUNTER — APPOINTMENT (OUTPATIENT)
Dept: INTERNAL MEDICINE | Facility: CLINIC | Age: 41
End: 2021-10-23
Payer: COMMERCIAL

## 2021-10-23 VITALS
OXYGEN SATURATION: 98 % | SYSTOLIC BLOOD PRESSURE: 122 MMHG | BODY MASS INDEX: 29.95 KG/M2 | HEIGHT: 63 IN | HEART RATE: 87 BPM | WEIGHT: 169 LBS | TEMPERATURE: 97.2 F | DIASTOLIC BLOOD PRESSURE: 80 MMHG

## 2021-10-23 DIAGNOSIS — G47.00 INSOMNIA, UNSPECIFIED: ICD-10-CM

## 2021-10-23 PROCEDURE — 99395 PREV VISIT EST AGE 18-39: CPT | Mod: 25

## 2021-10-25 LAB
24R-OH-CALCIDIOL SERPL-MCNC: 59.7 PG/ML
ALBUMIN SERPL ELPH-MCNC: 4.9 G/DL
ALP BLD-CCNC: 84 U/L
ALT SERPL-CCNC: 13 U/L
ANION GAP SERPL CALC-SCNC: 10 MMOL/L
APPEARANCE: CLEAR
AST SERPL-CCNC: 18 U/L
BASOPHILS # BLD AUTO: 0.04 K/UL
BASOPHILS NFR BLD AUTO: 0.5 %
BILIRUB SERPL-MCNC: 0.4 MG/DL
BILIRUBIN URINE: NEGATIVE
BLOOD URINE: NEGATIVE
BUN SERPL-MCNC: 13 MG/DL
CALCIUM SERPL-MCNC: 9.4 MG/DL
CHLORIDE SERPL-SCNC: 103 MMOL/L
CHOLEST SERPL-MCNC: 187 MG/DL
CK SERPL-CCNC: 122 U/L
CO2 SERPL-SCNC: 27 MMOL/L
COLOR: NORMAL
CREAT SERPL-MCNC: 0.8 MG/DL
EOSINOPHIL # BLD AUTO: 0.07 K/UL
EOSINOPHIL NFR BLD AUTO: 0.9 %
ESTIMATED AVERAGE GLUCOSE: 117 MG/DL
GLUCOSE QUALITATIVE U: NEGATIVE
GLUCOSE SERPL-MCNC: 87 MG/DL
HBA1C MFR BLD HPLC: 5.7 %
HCT VFR BLD CALC: 46 %
HDLC SERPL-MCNC: 64 MG/DL
HGB BLD-MCNC: 15.3 G/DL
IMM GRANULOCYTES NFR BLD AUTO: 0.2 %
KETONES URINE: NEGATIVE
LDLC SERPL CALC-MCNC: 113 MG/DL
LEUKOCYTE ESTERASE URINE: NEGATIVE
LYMPHOCYTES # BLD AUTO: 2.07 K/UL
LYMPHOCYTES NFR BLD AUTO: 25.5 %
MAN DIFF?: NORMAL
MCHC RBC-ENTMCNC: 30.9 PG
MCHC RBC-ENTMCNC: 33.3 GM/DL
MCV RBC AUTO: 92.9 FL
MONOCYTES # BLD AUTO: 0.57 K/UL
MONOCYTES NFR BLD AUTO: 7 %
NEUTROPHILS # BLD AUTO: 5.36 K/UL
NEUTROPHILS NFR BLD AUTO: 65.9 %
NITRITE URINE: NEGATIVE
NONHDLC SERPL-MCNC: 122 MG/DL
PH URINE: 6
PLATELET # BLD AUTO: 227 K/UL
POTASSIUM SERPL-SCNC: 4.4 MMOL/L
PROT SERPL-MCNC: 7.4 G/DL
PROTEIN URINE: NEGATIVE
RBC # BLD: 4.95 M/UL
RBC # FLD: 13.1 %
SODIUM SERPL-SCNC: 140 MMOL/L
SPECIFIC GRAVITY URINE: 1.02
T3 SERPL-MCNC: 75 NG/DL
T4 FREE SERPL-MCNC: 1.9 NG/DL
T4 SERPL-MCNC: 12.5 UG/DL
TRIGL SERPL-MCNC: 47 MG/DL
TSH SERPL-ACNC: 0.5 UIU/ML
URATE SERPL-MCNC: 2.6 MG/DL
UROBILINOGEN URINE: NORMAL
WBC # FLD AUTO: 8.13 K/UL

## 2021-10-29 ENCOUNTER — RX RENEWAL (OUTPATIENT)
Age: 41
End: 2021-10-29

## 2021-11-11 ENCOUNTER — TRANSCRIPTION ENCOUNTER (OUTPATIENT)
Age: 41
End: 2021-11-11

## 2021-11-14 NOTE — ASU PATIENT PROFILE, ADULT - NSTOBACCO QUIT READY_GEN_A_CORE_SD
not motivated to quit
You can access the FollowMyHealth Patient Portal offered by [facility] by registering at the following website: [email or web address]. By joining [employer]’s FollowMyHealth portal, you will also be able to view your health information using other applications (apps) compatible with our system.

## 2021-11-15 ENCOUNTER — TRANSCRIPTION ENCOUNTER (OUTPATIENT)
Age: 41
End: 2021-11-15

## 2021-11-22 ENCOUNTER — TRANSCRIPTION ENCOUNTER (OUTPATIENT)
Age: 41
End: 2021-11-22

## 2021-12-17 ENCOUNTER — TRANSCRIPTION ENCOUNTER (OUTPATIENT)
Age: 41
End: 2021-12-17

## 2022-02-01 ENCOUNTER — APPOINTMENT (OUTPATIENT)
Dept: INTERNAL MEDICINE | Facility: CLINIC | Age: 42
End: 2022-02-01
Payer: COMMERCIAL

## 2022-02-01 ENCOUNTER — NON-APPOINTMENT (OUTPATIENT)
Age: 42
End: 2022-02-01

## 2022-02-01 VITALS
HEART RATE: 86 BPM | RESPIRATION RATE: 15 BRPM | OXYGEN SATURATION: 100 % | BODY MASS INDEX: 29.94 KG/M2 | SYSTOLIC BLOOD PRESSURE: 104 MMHG | WEIGHT: 169 LBS | TEMPERATURE: 98.3 F | DIASTOLIC BLOOD PRESSURE: 72 MMHG

## 2022-02-01 DIAGNOSIS — K59.00 CONSTIPATION, UNSPECIFIED: ICD-10-CM

## 2022-02-01 PROCEDURE — 99214 OFFICE O/P EST MOD 30 MIN: CPT | Mod: 25

## 2022-02-01 PROCEDURE — G0444 DEPRESSION SCREEN ANNUAL: CPT | Mod: 59

## 2022-02-01 NOTE — HISTORY OF PRESENT ILLNESS
[FreeTextEntry1] : Fu hypothyroidism, fatigue, establish care  [de-identified] : Marimar is a 42 yo F w PMHx graves disease s/p thyroidectomy now hypothyroid, current smoker, mitral valve prolapse, IBS \par Patient f/u cardio soon for MVP \par c/o feeling tired and run down. Patient takes her medications as indicated. Patient feels she sometimes cannot fall asleep because of feeling anxious. She states that she worries excessively about life issues. She has been evaluated in the past for episodes of extreme anxiety and symptoms such as daze and lightheadedness, Last year had TTE which showed mild MVP. She denies any recent episodes of chest pain or shortness of breath. \par Also admits periods were she feels depressed and unmotivated. \par Patient has irregular periods\par LMP 12/21/2022- states that her ob gyn mentioned she was going into early menopause. Gets periods every two months or so. \par She will be starting a new treatment for her exophthalmus. \par

## 2022-02-01 NOTE — ASSESSMENT
[FreeTextEntry1] : Marimar is a 42 yo F w PMHx graves disease s/p thyroidectomy now hypothyroid, current smoker, mitral valve prolapse, IBS \par Patient to f/u cardio soon for MVP \par c/o feeling tired and run down. Patient takes her medications as indicated. Patient feels she sometimes cannot fall asleep because of feeling anxious. She states that she worries excessively about life issues. She has been evaluated in the past for episodes of extreme anxiety and symptoms such as daze and lightheadedness, Last year had TTE which showed mild MVP. She denies any recent episodes of chest pain or shortness of breath. \par Also admits periods were she feels depressed and unmotivated. \par Patient has irregular periods\par LMP 12/21/2022- states that her ob gyn mentioned she was going into early menopause. Gets periods every two months or so. \par She will be starting a new treatment for her exophthalmus. \par \par Hypothyroidism \par Fu tsh t3 t4 \par Will adjust meds as needed \par Patient will be started new treatment for exophthalmus, she will send in paperwork for approval \par \par Anxiety and depression \par Will start Sertraline 25 mg qd, BH counseling provided, 8 hours of sleep and engaging in relaxing activiies. \par Fu 6 weeks to assess symptoms of fatigue, anxiety and depression. Patient agreed with plan. \par \par IBS contipation predominant \par Educated about increasing fiber intake and adequate hydration \par Trial mitralax daily to regulate BM\par \par Amenorrhea \par Check pregnancy test \par \par \par

## 2022-02-01 NOTE — HEALTH RISK ASSESSMENT
[1] : 2) Feeling down, depressed, or hopeless for several days (1) [PHQ-2 Positive] : PHQ-2 Positive [1/2 of Days or More (2)] : 4.) Feeling tired or having little energy? Half the days or more [Several Days (1)] : 7.) Trouble concentrating on things, such as reading a newspaper or watching television? Several days [Not at All (0)] : 8.) Moving or speaking so slowly that other people could have noticed, or the opposite, moving or speaking faster than usual? Not at all [Mild] : severity of depression is mild [Somewhat Difficult] : How difficult have these problems made it for you to do your work, take care of things at home, or get along with people? Somewhat difficult [PHQ-9 Positive] : PHQ-9 Positive [I have developed a follow-up plan documented below in the note.] : I have developed a follow-up plan documented below in the note. [[id number]] : 2 [[id number]] : 6

## 2022-02-01 NOTE — PHYSICAL EXAM
[Normal Sclera/Conjunctiva] : normal sclera/conjunctiva [PERRL] : pupils equal round and reactive to light [EOMI] : extraocular movements intact [Coordination Grossly Intact] : coordination grossly intact [No Focal Deficits] : no focal deficits [Normal Gait] : normal gait [Normal] : affect was normal and insight and judgment were intact [de-identified] : + bulging eyes bilaterally

## 2022-02-01 NOTE — REVIEW OF SYSTEMS
[Constipation] : constipation [Insomnia] : insomnia [Anxiety] : anxiety [Depression] : depression [Negative] : Heme/Lymph [Discharge] : no discharge [Pain] : no pain [Redness] : no redness [Dryness] : no dryness  [Vision Problems] : no vision problems [Itching] : no itching [Suicidal] : not suicidal [FreeTextEntry3] : bulging eyes

## 2022-02-02 ENCOUNTER — NON-APPOINTMENT (OUTPATIENT)
Age: 42
End: 2022-02-02

## 2022-02-02 LAB
ALBUMIN SERPL ELPH-MCNC: 4.8 G/DL
ALP BLD-CCNC: 93 U/L
ALT SERPL-CCNC: 14 U/L
ANION GAP SERPL CALC-SCNC: 12 MMOL/L
AST SERPL-CCNC: 23 U/L
BASOPHILS # BLD AUTO: 0.04 K/UL
BASOPHILS NFR BLD AUTO: 0.5 %
BILIRUB SERPL-MCNC: 0.3 MG/DL
BUN SERPL-MCNC: 14 MG/DL
CALCIUM SERPL-MCNC: 9.5 MG/DL
CHLORIDE SERPL-SCNC: 106 MMOL/L
CO2 SERPL-SCNC: 26 MMOL/L
CREAT SERPL-MCNC: 0.79 MG/DL
EOSINOPHIL # BLD AUTO: 0.09 K/UL
EOSINOPHIL NFR BLD AUTO: 1.1 %
GLUCOSE SERPL-MCNC: 72 MG/DL
HCG SERPL QL: NEGATIVE
HCT VFR BLD CALC: 45.6 %
HGB BLD-MCNC: 14.7 G/DL
IMM GRANULOCYTES NFR BLD AUTO: 0.1 %
LYMPHOCYTES # BLD AUTO: 2.23 K/UL
LYMPHOCYTES NFR BLD AUTO: 26.9 %
MAN DIFF?: NORMAL
MCHC RBC-ENTMCNC: 31 PG
MCHC RBC-ENTMCNC: 32.2 GM/DL
MCV RBC AUTO: 96.2 FL
MONOCYTES # BLD AUTO: 0.61 K/UL
MONOCYTES NFR BLD AUTO: 7.4 %
NEUTROPHILS # BLD AUTO: 5.31 K/UL
NEUTROPHILS NFR BLD AUTO: 64 %
PAPP-A SERPL-ACNC: 1 MIU/ML
PLATELET # BLD AUTO: 224 K/UL
POTASSIUM SERPL-SCNC: 4.1 MMOL/L
PROT SERPL-MCNC: 7.4 G/DL
RBC # BLD: 4.74 M/UL
RBC # FLD: 13 %
SODIUM SERPL-SCNC: 143 MMOL/L
T3 SERPL-MCNC: 89 NG/DL
T3FREE SERPL-MCNC: 2.38 PG/ML
T4 FREE SERPL-MCNC: 1.8 NG/DL
T4 SERPL-MCNC: 11.7 UG/DL
TSH SERPL-ACNC: 0.43 UIU/ML
WBC # FLD AUTO: 8.29 K/UL

## 2022-02-03 ENCOUNTER — NON-APPOINTMENT (OUTPATIENT)
Age: 42
End: 2022-02-03

## 2022-02-16 ENCOUNTER — APPOINTMENT (OUTPATIENT)
Dept: CARDIOLOGY | Facility: CLINIC | Age: 42
End: 2022-02-16
Payer: COMMERCIAL

## 2022-02-16 ENCOUNTER — NON-APPOINTMENT (OUTPATIENT)
Age: 42
End: 2022-02-16

## 2022-02-16 VITALS
OXYGEN SATURATION: 99 % | HEIGHT: 63 IN | RESPIRATION RATE: 16 BRPM | BODY MASS INDEX: 30.83 KG/M2 | HEART RATE: 95 BPM | WEIGHT: 174 LBS | DIASTOLIC BLOOD PRESSURE: 78 MMHG | SYSTOLIC BLOOD PRESSURE: 121 MMHG

## 2022-02-16 PROCEDURE — 99214 OFFICE O/P EST MOD 30 MIN: CPT

## 2022-02-16 NOTE — CARDIOLOGY SUMMARY
[___] : [unfilled] [LVEF ___%] : LVEF [unfilled]% [Mild] : mild pulmonary hypertension [Normal] : normal LA size [Moderate] : moderate mitral regurgitation [de-identified] : NSr, diffuse non specific T wave changes

## 2022-02-16 NOTE — ASSESSMENT
[FreeTextEntry1] : Mitral valve prolapse with A2-A3 redundant and thickened flail A2 scallop. FC I  , moderate MR in 2020  , EF 45-50% \par moderate to severe MR , EF 55-60% by TOREY \par \par while it is likely MR is not severe yet and has preserved EF \par will refer to CT surgery (Dr. Monique) for discussion with the patient regarding timing and indication for mitral valve repair surgery \par

## 2022-02-16 NOTE — HISTORY OF PRESENT ILLNESS
[FreeTextEntry1] : 36 year old female with a history of goiter/hyperthyroidism s/p thyroidectomy in 2013 now on synthroid therapy. I follow her for mitral valve prolapse with moderate MR ( A1and A2 prolapse ) with mild to moderate TR and  no pulmonary hypertension on last echo and preserved  EF.  She c/o of SOB FC I symptoms but stable , no orthopnea or PND, no LE edema. No dizziness, palpitations or syncope.\par She sometimes c/o of panic attacks ( tingling and numbness of Left LE and UE , head clouding , heaviness  overall) \par \par 9/9/2020 \par Has not been seen for 2 years\par c/o of SOB on more than ordinary effort ( FC I) \par Gained weight then lost it again \par c/o of palpitations at night, feeling  like her heart stops and restart again , happened twice not associated with dizziness or syncope \par c/o being fatigued at times\par \par 8/11/2021\par Seen last year\par Echo in 10/2020 showed moderate MR, MV prolapse , posteriorly directed MR. \par SOB, occasional , at any time , blamed on asthma most of the time \par c/o occasionally multiple symptoms, (fatigue/numbness) \par \par 2/16/2021\par REturns for follow up \par TOREY showed moderate to severe MR , prolapse of the anterior leaflets involving the A2 and A3 \par no exertional chest pain or  SOB, palpitations, dizziness or syncope\par EF 50-55% \par \par \par

## 2022-03-07 ENCOUNTER — TRANSCRIPTION ENCOUNTER (OUTPATIENT)
Age: 42
End: 2022-03-07

## 2022-03-14 ENCOUNTER — APPOINTMENT (OUTPATIENT)
Dept: INTERNAL MEDICINE | Facility: CLINIC | Age: 42
End: 2022-03-14

## 2022-03-16 ENCOUNTER — OUTPATIENT (OUTPATIENT)
Dept: OUTPATIENT SERVICES | Facility: HOSPITAL | Age: 42
LOS: 1 days | End: 2022-03-16
Payer: COMMERCIAL

## 2022-03-16 ENCOUNTER — RESULT REVIEW (OUTPATIENT)
Age: 42
End: 2022-03-16

## 2022-03-16 ENCOUNTER — APPOINTMENT (OUTPATIENT)
Dept: MAMMOGRAPHY | Facility: CLINIC | Age: 42
End: 2022-03-16
Payer: COMMERCIAL

## 2022-03-16 ENCOUNTER — APPOINTMENT (OUTPATIENT)
Dept: ULTRASOUND IMAGING | Facility: CLINIC | Age: 42
End: 2022-03-16
Payer: COMMERCIAL

## 2022-03-16 DIAGNOSIS — E89.0 POSTPROCEDURAL HYPOTHYROIDISM: Chronic | ICD-10-CM

## 2022-03-16 DIAGNOSIS — Z00.00 ENCOUNTER FOR GENERAL ADULT MEDICAL EXAMINATION WITHOUT ABNORMAL FINDINGS: ICD-10-CM

## 2022-03-16 PROCEDURE — 77067 SCR MAMMO BI INCL CAD: CPT | Mod: 26

## 2022-03-16 PROCEDURE — 76641 ULTRASOUND BREAST COMPLETE: CPT | Mod: 26,50

## 2022-03-16 PROCEDURE — 77063 BREAST TOMOSYNTHESIS BI: CPT | Mod: 26

## 2022-03-16 PROCEDURE — 77063 BREAST TOMOSYNTHESIS BI: CPT

## 2022-03-16 PROCEDURE — 76641 ULTRASOUND BREAST COMPLETE: CPT

## 2022-03-16 PROCEDURE — 77067 SCR MAMMO BI INCL CAD: CPT

## 2022-03-17 ENCOUNTER — NON-APPOINTMENT (OUTPATIENT)
Age: 42
End: 2022-03-17

## 2022-03-18 DIAGNOSIS — M54.9 DORSALGIA, UNSPECIFIED: ICD-10-CM

## 2022-03-18 DIAGNOSIS — M47.816 SPONDYLOSIS W/OUT MYELOPATHY OR RADICULOPATHY, LUMBAR REGION: ICD-10-CM

## 2022-03-18 DIAGNOSIS — M47.812 SPONDYLOSIS W/OUT MYELOPATHY OR RADICULOPATHY, CERVICAL REGION: ICD-10-CM

## 2022-03-21 ENCOUNTER — NON-APPOINTMENT (OUTPATIENT)
Age: 42
End: 2022-03-21

## 2022-03-23 ENCOUNTER — TRANSCRIPTION ENCOUNTER (OUTPATIENT)
Age: 42
End: 2022-03-23

## 2022-03-25 ENCOUNTER — APPOINTMENT (OUTPATIENT)
Dept: INTERNAL MEDICINE | Facility: CLINIC | Age: 42
End: 2022-03-25
Payer: COMMERCIAL

## 2022-03-25 VITALS
TEMPERATURE: 97.7 F | WEIGHT: 176 LBS | DIASTOLIC BLOOD PRESSURE: 78 MMHG | OXYGEN SATURATION: 96 % | SYSTOLIC BLOOD PRESSURE: 120 MMHG | HEART RATE: 80 BPM | HEIGHT: 63 IN | RESPIRATION RATE: 15 BRPM | BODY MASS INDEX: 31.18 KG/M2

## 2022-03-25 DIAGNOSIS — F32.0 MAJOR DEPRESSIVE DISORDER, SINGLE EPISODE, MILD: ICD-10-CM

## 2022-03-25 PROCEDURE — 99214 OFFICE O/P EST MOD 30 MIN: CPT

## 2022-03-25 RX ORDER — SERTRALINE 25 MG/1
25 TABLET, FILM COATED ORAL DAILY
Qty: 90 | Refills: 2 | Status: DISCONTINUED | COMMUNITY
Start: 2022-02-01 | End: 2022-03-25

## 2022-03-25 NOTE — HISTORY OF PRESENT ILLNESS
[FreeTextEntry1] : Fu fatigue, depression  [de-identified] : Marimar is a 42 yo F w PMHx graves disease s/p thyroidectomy now hypothyroid, current smoker, mitral valve prolapse, IBS \par Patient states that she stopped the sertraline because she states that it made her feel tired. Patient states that her anxiety has not been that bad. She will be starting psychological treatment soon. \par Will be starting exophthalmos treatment soon. \par Possibly will be having mitral clipping for severe MVP

## 2022-03-25 NOTE — REVIEW OF SYSTEMS
[Fatigue] : fatigue [Vision Problems] : vision problems [Negative] : Heme/Lymph [FreeTextEntry3] : Exophthalmos

## 2022-03-25 NOTE — PHYSICAL EXAM
[Normal Sclera/Conjunctiva] : normal sclera/conjunctiva [PERRL] : pupils equal round and reactive to light [EOMI] : extraocular movements intact [Coordination Grossly Intact] : coordination grossly intact [No Focal Deficits] : no focal deficits [Normal Gait] : normal gait [Normal] : affect was normal and insight and judgment were intact [de-identified] : + bulging eyes bilaterally

## 2022-03-25 NOTE — ASSESSMENT
[FreeTextEntry1] : Marimar is a 42 yo F w PMHx graves disease s/p thyroidectomy now hypothyroid, current smoker, mitral valve prolapse, IBS \par Patient states that she stopped the sertraline because she states that it made her feel tired. Patient states that her anxiety has not been that bad. She will be starting psychological treatment soon. \par Will be starting exophthalmos treatment soon. \par Possibly will be having mitral clipping for severe MVP\par \par Depression and anxiety \par Will change to lexapro \par Agree with therapy

## 2022-04-11 PROBLEM — Z11.59 SCREENING FOR VIRAL DISEASE: Status: ACTIVE | Noted: 2020-09-02

## 2022-04-19 ENCOUNTER — APPOINTMENT (OUTPATIENT)
Dept: INTERNAL MEDICINE | Facility: CLINIC | Age: 42
End: 2022-04-19

## 2022-05-15 ENCOUNTER — NON-APPOINTMENT (OUTPATIENT)
Age: 42
End: 2022-05-15

## 2022-05-18 DIAGNOSIS — U07.1 COVID-19: ICD-10-CM

## 2022-05-26 ENCOUNTER — NON-APPOINTMENT (OUTPATIENT)
Age: 42
End: 2022-05-26

## 2022-06-23 ENCOUNTER — NON-APPOINTMENT (OUTPATIENT)
Age: 42
End: 2022-06-23

## 2022-07-27 ENCOUNTER — NON-APPOINTMENT (OUTPATIENT)
Age: 42
End: 2022-07-27

## 2022-08-01 ENCOUNTER — NON-APPOINTMENT (OUTPATIENT)
Age: 42
End: 2022-08-01

## 2022-08-19 ENCOUNTER — LABORATORY RESULT (OUTPATIENT)
Age: 42
End: 2022-08-19

## 2022-08-19 ENCOUNTER — APPOINTMENT (OUTPATIENT)
Dept: INTERNAL MEDICINE | Facility: CLINIC | Age: 42
End: 2022-08-19

## 2022-08-19 VITALS
RESPIRATION RATE: 15 BRPM | SYSTOLIC BLOOD PRESSURE: 110 MMHG | BODY MASS INDEX: 31.18 KG/M2 | OXYGEN SATURATION: 98 % | DIASTOLIC BLOOD PRESSURE: 80 MMHG | HEIGHT: 63 IN | TEMPERATURE: 98 F | WEIGHT: 176 LBS

## 2022-08-19 PROCEDURE — 99214 OFFICE O/P EST MOD 30 MIN: CPT | Mod: 25

## 2022-08-19 PROCEDURE — 36415 COLL VENOUS BLD VENIPUNCTURE: CPT

## 2022-08-19 RX ORDER — TEPROTUMUMAB 500 MG/1
500 INJECTION, POWDER, LYOPHILIZED, FOR SOLUTION INTRAVENOUS
Qty: 4 | Refills: 0 | Status: DISCONTINUED | COMMUNITY
Start: 2022-08-05

## 2022-08-19 RX ORDER — AZELASTINE HYDROCHLORIDE 137 UG/1
137 SPRAY, METERED NASAL
Qty: 30 | Refills: 0 | Status: DISCONTINUED | COMMUNITY
Start: 2022-07-28

## 2022-08-19 RX ORDER — ALBUTEROL SULFATE 90 UG/1
108 (90 BASE) INHALANT RESPIRATORY (INHALATION)
Qty: 7 | Refills: 0 | Status: DISCONTINUED | COMMUNITY
Start: 2022-03-07

## 2022-08-19 RX ORDER — MECLIZINE HYDROCHLORIDE 25 MG/1
25 TABLET ORAL
Qty: 42 | Refills: 0 | Status: DISCONTINUED | COMMUNITY
Start: 2022-07-28

## 2022-08-19 RX ORDER — NIRMATRELVIR AND RITONAVIR 300-100 MG
20 X 150 MG & KIT ORAL
Qty: 10 | Refills: 0 | Status: DISCONTINUED | COMMUNITY
Start: 2022-05-18 | End: 2022-08-19

## 2022-08-19 RX ORDER — BROMPHENIRAMINE MALEATE, PSEUDOEPHEDRINE HYDROCHLORIDE, 2; 30; 10 MG/5ML; MG/5ML; MG/5ML
30-2-10 SYRUP ORAL
Qty: 280 | Refills: 0 | Status: DISCONTINUED | COMMUNITY
Start: 2022-05-16

## 2022-08-19 NOTE — ASSESSMENT
[FreeTextEntry1] : Marimar is a 42 yo F w PMHx graves disease s/p thyroidectomy now hypothyroid, current smoker, mitral valve prolapse, IBS\par She is tapezza infusion for graves 1 inf every 3 weeks, developed hearing loss therefore she stopped. She only had 5/8 infusions. States that she feels fatigued. She was started on a tappering dose of prednisone 20mgx3 4 days 2 for 2 days and 1 for 1 day. She is also scheduled for an MRI to work up the hearing loss. \par c/o that she is having trouble swallowing, she had a total thyroidectomy\par c/o constipation- states she drinks enough water and uses metamucil but still very constipated \par c/o patches of hair loss\par Stopped lexapro- tried it only for one day. States she is not good taking medications. \par \par Dysphagia \par will check US thyroid + parathyroid to make sure the is no hypotrophic growth of residual tissue \par Referred to GI for possible endoscopy \par \par Hypothyroidism s/p total thyroidectomy 2/2 graves \par fu tsh \par cont current tx \par \par Anxiety\par Patient d/c lexapro, not interested in tx at the moment \par \par Constipation \par Advised to add benefiber, cont miralax and good hydration. If symptoms continue she is to follow up with gi \par \par Alopecia areata \par Might benefit from intralesional corticosteroids \par Provided info for derm \par \par Refused flu vaccine

## 2022-08-19 NOTE — REVIEW OF SYSTEMS
[Fatigue] : fatigue [Hearing Loss] : hearing loss [Constipation] : constipation [Negative] : Heme/Lymph

## 2022-08-19 NOTE — HISTORY OF PRESENT ILLNESS
[FreeTextEntry1] : Fu anxiety, hypothyroidism  [de-identified] : Marimar is a 40 yo F w PMHx graves disease s/p thyroidectomy now hypothyroid, current smoker, mitral valve prolapse, IBS\par She is tapezza infusion for graves 1 inf every 3 weeks, developed hearing loss therefore she stopped. She only had 5/8 infusions. States that she feels fatigued. She was started on a tappering dose of prednisone 20mgx3 4 days 2 for 2 days and 1 for 1 day. She is also scheduled for an MRI to work up the hearing loss. \par c/o that she is having trouble swallowing, she had a total thyroidectomy\par c/o constipation- states she drinks enough water and uses metamucil but still very constipated \par c/o patches of hair loss\par Stopped lexapro- tried it only for one day. States she is not good taking medications.

## 2022-08-19 NOTE — PHYSICAL EXAM
[Normal Sclera/Conjunctiva] : normal sclera/conjunctiva [PERRL] : pupils equal round and reactive to light [EOMI] : extraocular movements intact [Coordination Grossly Intact] : coordination grossly intact [No Focal Deficits] : no focal deficits [Normal Gait] : normal gait [Normal] : affect was normal and insight and judgment were intact [de-identified] : + bulging eyes bilaterally  [de-identified] : Patches of hair los, negative pull test

## 2022-08-22 LAB
ALBUMIN SERPL ELPH-MCNC: 4.8 G/DL
ALBUMIN SERPL ELPH-MCNC: 4.8 G/DL
ALP BLD-CCNC: 88 U/L
ALP BLD-CCNC: 88 U/L
ALT SERPL-CCNC: 42 U/L
ALT SERPL-CCNC: 45 U/L
ANION GAP SERPL CALC-SCNC: 12 MMOL/L
ANION GAP SERPL CALC-SCNC: 14 MMOL/L
AST SERPL-CCNC: 34 U/L
AST SERPL-CCNC: 35 U/L
BASOPHILS # BLD AUTO: 0.03 K/UL
BASOPHILS # BLD AUTO: 0.04 K/UL
BASOPHILS NFR BLD AUTO: 0.4 %
BASOPHILS NFR BLD AUTO: 0.6 %
BILIRUB SERPL-MCNC: 0.3 MG/DL
BILIRUB SERPL-MCNC: 0.4 MG/DL
BUN SERPL-MCNC: 12 MG/DL
BUN SERPL-MCNC: 12 MG/DL
CALCIUM SERPL-MCNC: 9.2 MG/DL
CALCIUM SERPL-MCNC: 9.2 MG/DL
CHLORIDE SERPL-SCNC: 102 MMOL/L
CHLORIDE SERPL-SCNC: 102 MMOL/L
CO2 SERPL-SCNC: 23 MMOL/L
CO2 SERPL-SCNC: 25 MMOL/L
CREAT SERPL-MCNC: 0.81 MG/DL
CREAT SERPL-MCNC: 0.82 MG/DL
EGFR: 93 ML/MIN/1.73M2
EGFR: 95 ML/MIN/1.73M2
EOSINOPHIL # BLD AUTO: 0.04 K/UL
EOSINOPHIL # BLD AUTO: 0.05 K/UL
EOSINOPHIL NFR BLD AUTO: 0.6 %
EOSINOPHIL NFR BLD AUTO: 0.7 %
GLUCOSE SERPL-MCNC: 100 MG/DL
GLUCOSE SERPL-MCNC: 99 MG/DL
HCT VFR BLD CALC: 42.1 %
HCT VFR BLD CALC: 42.7 %
HGB BLD-MCNC: 13.8 G/DL
HGB BLD-MCNC: 13.8 G/DL
IMM GRANULOCYTES NFR BLD AUTO: 0.1 %
IMM GRANULOCYTES NFR BLD AUTO: 0.3 %
LYMPHOCYTES # BLD AUTO: 1.98 K/UL
LYMPHOCYTES # BLD AUTO: 2 K/UL
LYMPHOCYTES NFR BLD AUTO: 27.6 %
LYMPHOCYTES NFR BLD AUTO: 27.7 %
MAN DIFF?: NORMAL
MAN DIFF?: NORMAL
MCHC RBC-ENTMCNC: 29.9 PG
MCHC RBC-ENTMCNC: 30.4 PG
MCHC RBC-ENTMCNC: 32.3 GM/DL
MCHC RBC-ENTMCNC: 32.8 GM/DL
MCV RBC AUTO: 92.6 FL
MCV RBC AUTO: 92.7 FL
MONOCYTES # BLD AUTO: 0.53 K/UL
MONOCYTES # BLD AUTO: 0.56 K/UL
MONOCYTES NFR BLD AUTO: 7.3 %
MONOCYTES NFR BLD AUTO: 7.8 %
NEUTROPHILS # BLD AUTO: 4.54 K/UL
NEUTROPHILS # BLD AUTO: 4.6 K/UL
NEUTROPHILS NFR BLD AUTO: 63.2 %
NEUTROPHILS NFR BLD AUTO: 63.7 %
PLATELET # BLD AUTO: 187 K/UL
PLATELET # BLD AUTO: 189 K/UL
POTASSIUM SERPL-SCNC: 4 MMOL/L
POTASSIUM SERPL-SCNC: 4 MMOL/L
PROT SERPL-MCNC: 7.5 G/DL
PROT SERPL-MCNC: 7.5 G/DL
RBC # BLD: 4.54 M/UL
RBC # BLD: 4.61 M/UL
RBC # FLD: 13 %
RBC # FLD: 13.1 %
SODIUM SERPL-SCNC: 139 MMOL/L
SODIUM SERPL-SCNC: 140 MMOL/L
TSH SERPL-ACNC: 0.04 UIU/ML
TSH SERPL-ACNC: 0.05 UIU/ML
WBC # FLD AUTO: 7.18 K/UL
WBC # FLD AUTO: 7.22 K/UL

## 2022-08-24 ENCOUNTER — OUTPATIENT (OUTPATIENT)
Dept: OUTPATIENT SERVICES | Facility: HOSPITAL | Age: 42
LOS: 1 days | End: 2022-08-24
Payer: COMMERCIAL

## 2022-08-24 ENCOUNTER — APPOINTMENT (OUTPATIENT)
Dept: ULTRASOUND IMAGING | Facility: CLINIC | Age: 42
End: 2022-08-24

## 2022-08-24 DIAGNOSIS — E89.0 POSTPROCEDURAL HYPOTHYROIDISM: Chronic | ICD-10-CM

## 2022-08-24 DIAGNOSIS — R13.12 DYSPHAGIA, OROPHARYNGEAL PHASE: ICD-10-CM

## 2022-08-24 PROCEDURE — 76536 US EXAM OF HEAD AND NECK: CPT

## 2022-08-24 PROCEDURE — 76536 US EXAM OF HEAD AND NECK: CPT | Mod: 26

## 2022-09-07 ENCOUNTER — NON-APPOINTMENT (OUTPATIENT)
Age: 42
End: 2022-09-07

## 2022-09-20 ENCOUNTER — EMERGENCY (EMERGENCY)
Facility: HOSPITAL | Age: 42
LOS: 1 days | Discharge: HOME | End: 2022-09-20
Attending: EMERGENCY MEDICINE
Payer: COMMERCIAL

## 2022-09-20 VITALS
WEIGHT: 177.91 LBS | RESPIRATION RATE: 18 BRPM | TEMPERATURE: 99 F | HEART RATE: 97 BPM | DIASTOLIC BLOOD PRESSURE: 73 MMHG | OXYGEN SATURATION: 98 % | HEIGHT: 64 IN | SYSTOLIC BLOOD PRESSURE: 114 MMHG

## 2022-09-20 DIAGNOSIS — E89.0 POSTPROCEDURAL HYPOTHYROIDISM: Chronic | ICD-10-CM

## 2022-09-20 PROCEDURE — 93005 ELECTROCARDIOGRAM TRACING: CPT

## 2022-09-20 PROCEDURE — 99283 EMERGENCY DEPT VISIT LOW MDM: CPT

## 2022-09-20 PROCEDURE — 93010 ELECTROCARDIOGRAM REPORT: CPT

## 2022-09-20 PROCEDURE — 99284 EMERGENCY DEPT VISIT MOD MDM: CPT

## 2022-09-20 RX ORDER — FLUTICASONE PROPIONATE 50 MCG
1 SPRAY, SUSPENSION NASAL
Qty: 1 | Refills: 0
Start: 2022-09-20 | End: 2022-10-04

## 2022-09-20 NOTE — ED PROVIDER NOTE - NS ED ROS FT
ROS: CONTUSIONAL: fever, chills, Denies  fatigue, wt loss. HEAD: Denies trauma, HA, Dizziness. EYE: Denies Acute visual changes, diplopia. ENMT: Denies change in hearing, tinnitus, epistaxis, difficulty swallowing, sore throat. CARDIO: palpitations,  Denies CP, edema. RESP: Denies Cough, Diff breathing, hemoptysis. GI: Denies N/V, ABD pain, change in bowel movement. URINARY: Denies difficulty urinating, pelvic pain. MS:  Denies joint pain, back pain, weakness, decreased ROM, swelling. NEURO: Denies change in gait, seizures, loss of sensation, dizziness, confusion LOC.  PSY: NO SI/HI. SKIN: Denies Rash, bruising.

## 2022-09-20 NOTE — ED PROVIDER NOTE - OBJECTIVE STATEMENT
PT with      SPMHX mitral valve regurgitation, s/p thyroidectomy   presents to the ED with complaint of general viral illness x6 days. Pt states that they had gradual onset of symptoms that started X6 days ago spontaneously wo significant sick contact. PT states that they have been having diffuse myalgia, non productive cough, and mild SOB that have not limited their ability to perform ADLs.  PT states that she has take OTC MEDs, With improvement. PT states that she had a momentary incednet of heart palpitations JPTa that resolved after a moment, Pt states that she felt like her hear was racing. PT states that she has taken OTC flu medication that included decongestant, PT admits to subjective fevers, dines HA, weakness, LOC, Vomiting, urinary symptoms. PT with      SPMHX mitral valve regurgitation, s/p thyroidectomy   presents to the ED with complaint of general viral illness x6 days. Pt states that they had gradual onset of symptoms that started X6 days ago spontaneously wo significant sick contact. PT states that they have been having diffuse myalgia, non productive cough, and mild SOB that have not limited their ability to perform ADLs.  PT states that she has take OTC MEDs, With improvement. PT states that she had a momentary incident of heart palpitations JPTa that resolved after a moment, Pt states that she felt like her hear was racing. PT states that she has taken OTC flu medication that included decongestant, PT admits to subjective fevers, dines HA, weakness, LOC, Vomiting, urinary symptoms.

## 2022-09-20 NOTE — ED PROVIDER NOTE - PATIENT PORTAL LINK FT
You can access the FollowMyHealth Patient Portal offered by [facility] by registering at the following website: [email or web address]. By joining [employer]’s FollowMyHealth portal, you will also be able to view your health information using other applications (apps) compatible with our system.

## 2022-09-20 NOTE — ED PROVIDER NOTE - CLINICAL SUMMARY MEDICAL DECISION MAKING FREE TEXT BOX
Problem: Pain - Adult  Goal: Verbalizes/displays adequate comfort level or baseline comfort level  Description: INTERVENTIONS:  1. Encourage patient to monitor pain and request interventions  2. Assess pain using the appropriate pain scale  3. Administer analgesics based on type and severity of pain and evaluate response  4. Educate/Implement non-pharmacological measures as appropriate and evaluate response  5. Consider cultural, developmental and social influences on pain and pain management  6. Notify Provider if interventions unsuccessful or patient reports new pain  Outcome: Progressing     Problem: Infection - Adult  Goal: Absence of infection during hospitalization  Description: INTERVENTIONS:  1. Assess and monitor for signs and symptoms of infection  2. Monitor lab/diagnostic results  3. Monitor all insertion sites/wounds/incisions  4. Monitor secretions for changes in amount and color  5. Administer medications as ordered  6. Educate and encourage patient and family to use good hand hygiene technique  7. Identify and educate in appropriate isolation precautions for identified infection/condition  Outcome: Progressing     Problem: Safety Adult  Goal: Patient will remain safe during hospitalization  Description: INTERVENTIONS    1. Assess patient for fall risk and implement interventions if needed  2. Use safe transport techniques  3. Assess patient using the appropriate Nicolás skin assessment scale  4. Assess patient for risk of aspiration  5. Assess patient for risk of elopement  6. Assess patient for risk of suicide  Outcome: Progressing     Problem: Daily Care  Goal: Daily care needs are met  Description: INTERVENTIONS:   1. Assess and monitor skin integrity  2. Identify patients at risk for skin breakdown on admission and per policy  3. Assess and monitor ability to perform self care and identify potential discharge needs  4. Assess skin integrity/risk for skin breakdown  5. Assist patient with  activities of daily living as needed  6. Encourage independent activity per ability   7. Provide mouth care   8. Include patient/family/caregiver in decisions related to daily care   Outcome: Progressing     Problem: Knowledge Deficit  Goal: Patient/family/caregiver demonstrates understanding of disease process, treatment plan, medications, and discharge instructions  Description: INTERVENTIONS:   1. Complete learning assessment and assess knowledge base  2. Provide teaching at level of understanding   3. Provide teaching via preferred learning methods  Outcome: Progressing     Problem: Skin/Tissue Integrity - Adult  Goal: Incisions, wounds, or drain sites healing without S/S of infection  Description: INTERVENTIONS  1. Assess and document risk factors for skin breakdown  2. Assess and document skin integrity  3. Assess and document dressing/incision, wound bed, drain sites and surrounding tissue  4. Implement wound care per orders  Outcome: Progressing     Problem: Musculoskeletal - Adult  Goal: Maintain proper alignment of affected body part  Description: INTERVENTIONS:  1. Support and protect limb and body alignment per provider order  2. Instruct and reinforce with patient and family use of appropriate assistive device and precautions (e.g. spinal or hip dislocation precautions)  Outcome: Progressing  Goal: Return ADL status to a safe level of function  Description: INTERVENTIONS:  1. Assess patient's ADL deficits and provide assistive devices as needed  2. Obtain PT/OT consults as needed  3. Assist and instruct patient to increase activity and self care  Outcome: Progressing     Problem: Discharge Barriers  Goal: Patient's discharge needs are met  Description: INTERVENTIONS:  1. Assess patient for self-management skills  2. Encourage participation in management  3. Identify potential discharge barriers on admission and throughout hospital stay  4. Involve patient/family/caregiver in discharge planning  process  5. Collaborate with case management/ for discharge needs  Outcome: Not Progressing      Pt with stable VS, tolerating PO in the , non toxic appearing interacting with staff and family appropriately,  PT with normal PE, non hypoxic non exertional CP, SOB not limiting ADLs=, secondary symptoms likely related to use of stimulant decongestant educated to stop taking, PT will be dc home with follow up to PCP, good supportive care, educated about proper use of antipyretics, good hydrations, educated about self isolation,

## 2022-09-20 NOTE — ED PROVIDER NOTE - NS ED ATTENDING STATEMENT MOD
This was a shared visit with the YANG. I reviewed and verified the documentation and independently performed the documented:

## 2022-09-20 NOTE — ED PROVIDER NOTE - ADDITIONAL NOTES AND INSTRUCTIONS:
PT was evaluated At [facility] ED and was found to have a condition that warranted time of to rest and heal from WORK/SCHOOL.   Brandon Nickerson PA-C

## 2022-09-20 NOTE — ED PROVIDER NOTE - NSICDXFAMILYHX_GEN_ALL_CORE_FT
postop monitoring FAMILY HISTORY:  Family history of neoplasm of brain    Grandparent  Still living? Unknown  Family history of breast cancer, Age at diagnosis: Age Unknown

## 2022-09-20 NOTE — ED PROVIDER NOTE - ATTENDING APP SHARED VISIT CONTRIBUTION OF CARE
Const: Awake, alert and oriented. In no acute distress. Well appearing.  HEENT: NC/AT. Moist mucous membranes.  Eyes: No scleral icterus. EOMI.  Neck:. Soft and supple. Full ROM without pain.  Cardiac: Regular rate and rhythm. +S1/S2. No murmurs. Peripheral pulses 2+ and symmetric. No LE edema.  Resp: Speaking in full sentences. No evidence of respiratory distress. No wheezes, rales or rhonchi.  Abd: Soft, non-tender, non-distended. Normal bowel sounds in all 4 quadrants. No guarding or rebound.  Neuro: no gross deficits, moving all extremities, normal speech  Skin: No rashes, abrasions or lacerations.     agree with acp plan of care  This was a shared visit with YANG. I reviewed and verified the documentation and independently performed the documented history/exam/mdm.

## 2022-09-22 DIAGNOSIS — R05.9 COUGH, UNSPECIFIED: ICD-10-CM

## 2022-09-22 DIAGNOSIS — R09.81 NASAL CONGESTION: ICD-10-CM

## 2022-09-23 ENCOUNTER — OUTPATIENT (OUTPATIENT)
Dept: OUTPATIENT SERVICES | Facility: HOSPITAL | Age: 42
LOS: 1 days | End: 2022-09-23

## 2022-09-23 DIAGNOSIS — E89.0 POSTPROCEDURAL HYPOTHYROIDISM: Chronic | ICD-10-CM

## 2022-09-23 DIAGNOSIS — R05.9 COUGH, UNSPECIFIED: ICD-10-CM

## 2022-09-23 PROCEDURE — 71046 X-RAY EXAM CHEST 2 VIEWS: CPT | Mod: 26

## 2022-09-24 DIAGNOSIS — J01.90 ACUTE SINUSITIS, UNSPECIFIED: ICD-10-CM

## 2022-09-28 ENCOUNTER — NON-APPOINTMENT (OUTPATIENT)
Age: 42
End: 2022-09-28

## 2022-09-29 ENCOUNTER — EMERGENCY (EMERGENCY)
Facility: HOSPITAL | Age: 42
LOS: 1 days | Discharge: HOME | End: 2022-09-29
Attending: EMERGENCY MEDICINE
Payer: COMMERCIAL

## 2022-09-29 VITALS
SYSTOLIC BLOOD PRESSURE: 137 MMHG | OXYGEN SATURATION: 100 % | HEART RATE: 64 BPM | DIASTOLIC BLOOD PRESSURE: 65 MMHG | RESPIRATION RATE: 20 BRPM | TEMPERATURE: 98 F

## 2022-09-29 VITALS — WEIGHT: 175.05 LBS | HEIGHT: 64 IN

## 2022-09-29 DIAGNOSIS — I34.1 NONRHEUMATIC MITRAL (VALVE) PROLAPSE: ICD-10-CM

## 2022-09-29 DIAGNOSIS — I36.1 NONRHEUMATIC TRICUSPID (VALVE) INSUFFICIENCY: ICD-10-CM

## 2022-09-29 DIAGNOSIS — E89.0 POSTPROCEDURAL HYPOTHYROIDISM: Chronic | ICD-10-CM

## 2022-09-29 DIAGNOSIS — R07.9 CHEST PAIN, UNSPECIFIED: ICD-10-CM

## 2022-09-29 LAB
ALBUMIN SERPL ELPH-MCNC: 4.8 G/DL — SIGNIFICANT CHANGE UP (ref 3.3–5.2)
ALP SERPL-CCNC: 90 U/L — SIGNIFICANT CHANGE UP (ref 40–120)
ALT FLD-CCNC: 36 U/L — HIGH
ANION GAP SERPL CALC-SCNC: 14 MMOL/L — SIGNIFICANT CHANGE UP (ref 5–17)
APTT BLD: 30 SEC — SIGNIFICANT CHANGE UP (ref 27.5–35.5)
AST SERPL-CCNC: 22 U/L — SIGNIFICANT CHANGE UP
BASOPHILS # BLD AUTO: 0.05 K/UL — SIGNIFICANT CHANGE UP (ref 0–0.2)
BASOPHILS NFR BLD AUTO: 0.5 % — SIGNIFICANT CHANGE UP (ref 0–2)
BILIRUB SERPL-MCNC: 0.5 MG/DL — SIGNIFICANT CHANGE UP (ref 0.4–2)
BUN SERPL-MCNC: 13.3 MG/DL — SIGNIFICANT CHANGE UP (ref 8–20)
CALCIUM SERPL-MCNC: 9.7 MG/DL — SIGNIFICANT CHANGE UP (ref 8.4–10.5)
CHLORIDE SERPL-SCNC: 100 MMOL/L — SIGNIFICANT CHANGE UP (ref 98–107)
CO2 SERPL-SCNC: 27 MMOL/L — SIGNIFICANT CHANGE UP (ref 22–29)
CREAT SERPL-MCNC: 0.83 MG/DL — SIGNIFICANT CHANGE UP (ref 0.5–1.3)
D DIMER BLD IA.RAPID-MCNC: <150 NG/ML DDU — SIGNIFICANT CHANGE UP
EGFR: 91 ML/MIN/1.73M2 — SIGNIFICANT CHANGE UP
EOSINOPHIL # BLD AUTO: 0.05 K/UL — SIGNIFICANT CHANGE UP (ref 0–0.5)
EOSINOPHIL NFR BLD AUTO: 0.5 % — SIGNIFICANT CHANGE UP (ref 0–6)
GLUCOSE SERPL-MCNC: 91 MG/DL — SIGNIFICANT CHANGE UP (ref 70–99)
HCT VFR BLD CALC: 44.6 % — SIGNIFICANT CHANGE UP (ref 34.5–45)
HGB BLD-MCNC: 15.4 G/DL — SIGNIFICANT CHANGE UP (ref 11.5–15.5)
IMM GRANULOCYTES NFR BLD AUTO: 0.2 % — SIGNIFICANT CHANGE UP (ref 0–0.9)
INR BLD: 1.09 RATIO — SIGNIFICANT CHANGE UP (ref 0.88–1.16)
LYMPHOCYTES # BLD AUTO: 3.55 K/UL — HIGH (ref 1–3.3)
LYMPHOCYTES # BLD AUTO: 34.3 % — SIGNIFICANT CHANGE UP (ref 13–44)
MCHC RBC-ENTMCNC: 31 PG — SIGNIFICANT CHANGE UP (ref 27–34)
MCHC RBC-ENTMCNC: 34.5 GM/DL — SIGNIFICANT CHANGE UP (ref 32–36)
MCV RBC AUTO: 89.9 FL — SIGNIFICANT CHANGE UP (ref 80–100)
MONOCYTES # BLD AUTO: 0.86 K/UL — SIGNIFICANT CHANGE UP (ref 0–0.9)
MONOCYTES NFR BLD AUTO: 8.3 % — SIGNIFICANT CHANGE UP (ref 2–14)
NEUTROPHILS # BLD AUTO: 5.81 K/UL — SIGNIFICANT CHANGE UP (ref 1.8–7.4)
NEUTROPHILS NFR BLD AUTO: 56.2 % — SIGNIFICANT CHANGE UP (ref 43–77)
NT-PROBNP SERPL-SCNC: 77 PG/ML — SIGNIFICANT CHANGE UP (ref 0–300)
PLATELET # BLD AUTO: 228 K/UL — SIGNIFICANT CHANGE UP (ref 150–400)
POTASSIUM SERPL-MCNC: 3.4 MMOL/L — LOW (ref 3.5–5.3)
POTASSIUM SERPL-SCNC: 3.4 MMOL/L — LOW (ref 3.5–5.3)
PROT SERPL-MCNC: 8.2 G/DL — SIGNIFICANT CHANGE UP (ref 6.6–8.7)
PROTHROM AB SERPL-ACNC: 12.6 SEC — SIGNIFICANT CHANGE UP (ref 10.5–13.4)
RAPID RVP RESULT: SIGNIFICANT CHANGE UP
RBC # BLD: 4.96 M/UL — SIGNIFICANT CHANGE UP (ref 3.8–5.2)
RBC # FLD: 12 % — SIGNIFICANT CHANGE UP (ref 10.3–14.5)
SARS-COV-2 RNA SPEC QL NAA+PROBE: SIGNIFICANT CHANGE UP
SODIUM SERPL-SCNC: 141 MMOL/L — SIGNIFICANT CHANGE UP (ref 135–145)
T3 SERPL-MCNC: 89 NG/DL — SIGNIFICANT CHANGE UP (ref 80–200)
T4 AB SER-ACNC: 12.4 UG/DL — HIGH (ref 4.5–12)
TROPONIN T SERPL-MCNC: <0.01 NG/ML — SIGNIFICANT CHANGE UP (ref 0–0.06)
TROPONIN T SERPL-MCNC: <0.01 NG/ML — SIGNIFICANT CHANGE UP (ref 0–0.06)
TSH SERPL-MCNC: 0.13 UIU/ML — LOW (ref 0.27–4.2)
WBC # BLD: 10.34 K/UL — SIGNIFICANT CHANGE UP (ref 3.8–10.5)
WBC # FLD AUTO: 10.34 K/UL — SIGNIFICANT CHANGE UP (ref 3.8–10.5)

## 2022-09-29 PROCEDURE — 80053 COMPREHEN METABOLIC PANEL: CPT

## 2022-09-29 PROCEDURE — 71046 X-RAY EXAM CHEST 2 VIEWS: CPT

## 2022-09-29 PROCEDURE — 71046 X-RAY EXAM CHEST 2 VIEWS: CPT | Mod: 26

## 2022-09-29 PROCEDURE — 84443 ASSAY THYROID STIM HORMONE: CPT

## 2022-09-29 PROCEDURE — 0225U NFCT DS DNA&RNA 21 SARSCOV2: CPT

## 2022-09-29 PROCEDURE — 84436 ASSAY OF TOTAL THYROXINE: CPT

## 2022-09-29 PROCEDURE — 83880 ASSAY OF NATRIURETIC PEPTIDE: CPT

## 2022-09-29 PROCEDURE — 84484 ASSAY OF TROPONIN QUANT: CPT

## 2022-09-29 PROCEDURE — 36415 COLL VENOUS BLD VENIPUNCTURE: CPT

## 2022-09-29 PROCEDURE — 85730 THROMBOPLASTIN TIME PARTIAL: CPT

## 2022-09-29 PROCEDURE — 93306 TTE W/DOPPLER COMPLETE: CPT | Mod: 26

## 2022-09-29 PROCEDURE — 84702 CHORIONIC GONADOTROPIN TEST: CPT

## 2022-09-29 PROCEDURE — 99285 EMERGENCY DEPT VISIT HI MDM: CPT

## 2022-09-29 PROCEDURE — 84480 ASSAY TRIIODOTHYRONINE (T3): CPT

## 2022-09-29 PROCEDURE — 85379 FIBRIN DEGRADATION QUANT: CPT

## 2022-09-29 PROCEDURE — C8929: CPT

## 2022-09-29 PROCEDURE — 96360 HYDRATION IV INFUSION INIT: CPT | Mod: XU

## 2022-09-29 PROCEDURE — 85610 PROTHROMBIN TIME: CPT

## 2022-09-29 PROCEDURE — 99285 EMERGENCY DEPT VISIT HI MDM: CPT | Mod: 25

## 2022-09-29 PROCEDURE — 93005 ELECTROCARDIOGRAM TRACING: CPT

## 2022-09-29 PROCEDURE — 93010 ELECTROCARDIOGRAM REPORT: CPT

## 2022-09-29 PROCEDURE — 99284 EMERGENCY DEPT VISIT MOD MDM: CPT

## 2022-09-29 PROCEDURE — 85025 COMPLETE CBC W/AUTO DIFF WBC: CPT

## 2022-09-29 RX ORDER — SODIUM CHLORIDE 9 MG/ML
1000 INJECTION INTRAMUSCULAR; INTRAVENOUS; SUBCUTANEOUS ONCE
Refills: 0 | Status: COMPLETED | OUTPATIENT
Start: 2022-09-29 | End: 2022-09-29

## 2022-09-29 RX ORDER — POTASSIUM CHLORIDE 20 MEQ
40 PACKET (EA) ORAL ONCE
Refills: 0 | Status: COMPLETED | OUTPATIENT
Start: 2022-09-29 | End: 2022-09-29

## 2022-09-29 RX ADMIN — SODIUM CHLORIDE 1000 MILLILITER(S): 9 INJECTION INTRAMUSCULAR; INTRAVENOUS; SUBCUTANEOUS at 11:31

## 2022-09-29 RX ADMIN — Medication 40 MILLIEQUIVALENT(S): at 14:37

## 2022-09-29 RX ADMIN — SODIUM CHLORIDE 1000 MILLILITER(S): 9 INJECTION INTRAMUSCULAR; INTRAVENOUS; SUBCUTANEOUS at 14:35

## 2022-09-29 NOTE — ED PROVIDER NOTE - PATIENT PORTAL LINK FT
You can access the FollowMyHealth Patient Portal offered by [facility] by registering at the following website: [email or web address]. By joining [employer]’s FollowMyHealth portal, you will also be able to view your health information using other applications (apps) compatible with our system.

## 2022-09-29 NOTE — CONSULT NOTE ADULT - NS ATTEND AMEND GEN_ALL_CORE FT
patient with chest pain and palpitations in the setting of recent COVID19 infection  EKG with NSR and TWI, previously known. Also with occasional PVCs  pending TTE, previous TTE with preserved EF and known MV prolapse. Patient has scheduled CTSx consult with Dr. Monique on 10/11 and f/u cardio appt with Dr. Sheppard on 10/12

## 2022-09-29 NOTE — CONSULT NOTE ADULT - PROBLEM SELECTOR RECOMMENDATION 9
.  - patient with chest pain and palpitations in the setting of recent COVID19 infection  - was started on medrol dose pack for congestion by PCP (last dose today)  - EKG with NSR and TWI, previously known. Also with occasional PVCs  - monitor on Tele for acute arrhythmia monitoring  - check labs including CBC, BMP, trend CE x2, ECG and CXR, TSH, DDimer  - pending TTE, previous TTE with preserved EF and known MV prolapse. Patient has scheduled CTSx consult with Dr. Monique on 10/11 and f/u cardio appt with Dr. Sheppard on 10/12  - if TTE without significant change from previous and trop negative x 2, recommend patient be d/c and maintain f/u appts with cardiology and CTSx  - advised smoking cessation

## 2022-09-29 NOTE — CONSULT NOTE ADULT - SUBJECTIVE AND OBJECTIVE BOX
[facility]                                              CARDIOLOGY AT [place] [address]                                             Telephone: [phone]. Fax:[phone]                                                       CARDIOLOGY CONSULTATION NOTE                                                                                             History obtained by: Patient and medical record  Community Cardiologist: Dr. Sheppard   obtained: Yes [  ] No [ x ]  Reason for Consultation: palpitations and chest pressure  Available out pt records reviewed: Yes [  ] No [  ]    Chief complaint:    Patient is a 42y old  Female who presents with a chief complaint of     HPI:  41 y/o F with PMH current smoker (1 ppWeek) hyperthyroid (s/p thyroidectomy), goiter, Mitral Valve prolapse (has eval appt with Dr. Monique on 10/11) and recent COVID19 infx (positive on 9/16) who presented to urgent care today with palpitations and sent in due to EKG changes. Per patient, she was was symptomatic with COVID19 infection and was placed on a medrol dose pack (last dose today). She endorses symptomatic palpitations beginning yesterday prompting visit to urgent care. EKG on arrival with inferior lead TWI, however these were seen on previous EKGS. Denies , back pain, headache, dizziness, SOB, BLANCO, diaphoresis, syncope or N/V.        CARDIAC TESTING   ECHO:  < from: TOREY Echo Doppler (09.08.21 @ 09:26) >  PHYSICIAN INTERPRETATION:  Left Ventricle:  Global LV systolic function was normal. Left ventricular ejection fraction, by visual estimation, is 55 to 60%. Normal LV filling pressures.  Right Ventricle: Normal right ventricular size and function.  Left Atrium: Mildly enlarged left atrium. No left atrial appendage thrombus is seen and normal left atrial appendage velocities. Color flow doppler and intravenous injection of agitated saline demonstrates the presence of an intact intra atrial septum.  Pericardium: There is no evidence of pericardial effusion.  Mitral Valve: Moderate to severe mitral valve regurgitation is seen. Prolapse of the anterior leaflet involving the A2 and A3 scallops.  Tricuspid Valve: Structurally normal tricuspid valve, with normal leaflet excursion. Mild tricuspid regurgitation is visualized.  Aortic Valve: The aortic valve is trileaflet. No evidence of aortic stenosis.  Pulmonic Valve: The pulmonic valve is normal. Trace pulmonic valve regurgitation.  Aorta: The aortic root and ascending aorta are structurally normal, with no evidence of dilitation.  Venous: The pulmonary veins appear normal.  Shunts: Agitated saline contrast was given intravenously to evaluate for intracardiac shunting.  In comparison to the previous echocardiogram(s): Prior examinations are available and were reviewed for comparison purposes. Compared to the prior TOREY study from 10/7/16, LV systolic function/EF remain preserved, known anterior mitral leaflet prolapse with slight progression of mitral regurgitation.      Summary:   1. Left ventricular ejection fraction, by visual estimation, is 55 to 60%, by 3D volume 59%, LV  ml.   2. Normal global left ventricular systolic function.   3. Normal right ventricular size and function, estimated RVSP 16 mmHg.   4. Mildly enlarged left atrium.   5. Prolapse of the anterior leaflet involving the A2 and A3 scallops.   6. Moderate-to-severe mitral valve regurgitation. There are 2 separate jets, the dominant jet is posteriorly directed with EROA (by PISA) of 0.20 cm2 and regurgitant volume of 39 ml. There is no systolic pulmonary reversal.   7. No left atrial appendage thrombus and normal left atrial appendage velocities.   8. Mild tricuspid regurgitation.   9. Color flow doppler and intravenous injection of agitated saline demonstrates the presence of an intact intra atrial septum.  10. There is no evidence of pericardial effusion.  11. Compared to the priorTEE study from 10/7/16, LV systolic function/EF remain preserved, known anterior mitral leaflet prolapse with slight progression of mitral regurgitation.    Guero Duarte DO Electronically signed on 9/8/2021 at 12:53:57 PM    < end of copied text >    STRESS:    CATH:     ELECTROPHYSIOLOGY:     PAST MEDICAL HISTORY  Non-rheumatic tricuspid valve insufficiency  Mitral valve prolapse  Non-rheumatic mitral regurgitation  Goiter  Hyperthyroidism  Post-therapeutic hypothyroidism  Glaucoma  Graves disease        PAST SURGICAL HISTORY  History of thyroidectomy        SOCIAL HISTORY:    CIGARETTES: current smoker  1 pack per week  ALCOHOL: social  DRUGS: occasional marijuana use    FAMILY HISTORY:  Family history of neoplasm of brain    Family history of breast cancer (Grandparent)      Family History of Cardiovascular Disease:  Yes [  ] No [  ]  Coronary Artery Disease in first degree relative: Yes [  ] No [  ]  Sudden Cardiac Death in First degree relative: Yes [  ] No [  ]    HOME MEDICATIONS:  budesonide 32 mcg/inh nasal aerosol with adapter: nasal once a day (08 Sep 2021 07:54)  Synthroid 112 mcg (0.112 mg) oral tablet: 1 tab(s) orally once a day (07 Oct 2016 11:44)  ZyrTEC 10 mg oral tablet: 1 tab(s) orally once a day (08 Sep 2021 07:53)      CURRENT CARDIAC MEDICATIONS:      CURRENT OTHER MEDICATIONS:      ALLERGIES:   No Known Allergies      REVIEW OF SYMPTOMS:   CONSTITUTIONAL: No fever, no chills, no weight loss, no weight gain, no fatigue   ENMT:  No vertigo; No sinus or throat pain  NECK: No pain or stiffness  CARDIOVASCULAR: No chest pain, no dyspnea, no syncope/presyncope, no palpitations, no dizziness, no Orthopnea, no Paroxsymal nocturnal dyspnea  RESPIRATORY: no Shortness of breath, no cough, no wheezing  : No dysuria, no hematuria   GI: No dark color stool, no nausea, no diarrhea, no constipation, no abdominal pain   NEURO: No headache, no slurred speech   MUSCULOSKELETAL: No joint pain or swelling; No muscle, back, or extremity pain  PSYCH: No agitation, no anxiety.    ALL OTHER REVIEW OF SYSTEMS ARE NEGATIVE.    VITAL SIGNS:  T(C): --  T(F): --  HR: 73 (09-29-22 @ 10:53) (68 - 73)  BP: 115/75 (09-29-22 @ 10:53) (115/75 - 115/75)  RR: 24 (09-29-22 @ 10:46) (24 - 24)  SpO2: 98% (09-29-22 @ 10:53) (98% - 98%)    INTAKE AND OUTPUT:       PHYSICAL EXAM:  Constitutional: Comfortable . No acute distress.   HEENT: Atraumatic and normocephalic , neck is supple . no JVD. No carotid bruit.  CNS: A&Ox3. No focal deficits.   Respiratory: CTAB, unlabored   Cardiovascular: RRR normal s1 s2. No murmur. No rubs or gallop.  Gastrointestinal: Soft, non-tender. +Bowel sounds.   Extremities: 2+ Peripheral Pulses, No clubbing, cyanosis, or edema  Psychiatric: Calm . no agitation.   Skin: Warm and dry, no ulcers on extremities     LABS:                            15.4   10.34 )-----------( 228      ( 29 Sep 2022 11:18 )             44.6           PT/INR - ( 29 Sep 2022 11:18 )   PT: 12.6 sec;   INR: 1.09 ratio         PTT - ( 29 Sep 2022 11:18 )  PTT:30.0 sec            INTERPRETATION OF TELEMETRY:     ECG:   Prior ECG: Yes [  ] No [  ]    RADIOLOGY & ADDITIONAL STUDIES:    X-ray:    CT scan:   MRI:   US:                                                [facility]                                              CARDIOLOGY AT [place] [address]                                             Telephone: [phone]. Fax:[phone]                                                       CARDIOLOGY CONSULTATION NOTE                                                                                             History obtained by: Patient and medical record  Community Cardiologist: Dr. Sheppard   obtained: Yes [  ] No [ x ]  Reason for Consultation: palpitations and chest pressure  Available out pt records reviewed: Yes [  ] No [  ]    Chief complaint:    Patient is a 42y old  Female who presents with a chief complaint of     HPI:  41 y/o F with PMH current smoker (1 ppWeek) hyperthyroid (s/p thyroidectomy), goiter, Mitral Valve prolapse (has eval appt with Dr. Monique on 10/11) and recent COVID19 infx (positive on 9/16) who presented to urgent care today with palpitations and sent in due to EKG changes. Per patient, she was was symptomatic with COVID19 infection and was placed on a medrol dose pack (last dose today). She endorses symptomatic palpitations beginning yesterday prompting visit to urgent care. EKG on arrival with inferior lead TWI, however these were seen on previous EKGS. Denies , back pain, headache, dizziness, SOB, BLANCO, diaphoresis, syncope or N/V.        CARDIAC TESTING   ECHO:  < from: TOREY Echo Doppler (09.08.21 @ 09:26) >  PHYSICIAN INTERPRETATION:  Left Ventricle:  Global LV systolic function was normal. Left ventricular ejection fraction, by visual estimation, is 55 to 60%. Normal LV filling pressures.  Right Ventricle: Normal right ventricular size and function.  Left Atrium: Mildly enlarged left atrium. No left atrial appendage thrombus is seen and normal left atrial appendage velocities. Color flow doppler and intravenous injection of agitated saline demonstrates the presence of an intact intra atrial septum.  Pericardium: There is no evidence of pericardial effusion.  Mitral Valve: Moderate to severe mitral valve regurgitation is seen. Prolapse of the anterior leaflet involving the A2 and A3 scallops.  Tricuspid Valve: Structurally normal tricuspid valve, with normal leaflet excursion. Mild tricuspid regurgitation is visualized.  Aortic Valve: The aortic valve is trileaflet. No evidence of aortic stenosis.  Pulmonic Valve: The pulmonic valve is normal. Trace pulmonic valve regurgitation.  Aorta: The aortic root and ascending aorta are structurally normal, with no evidence of dilitation.  Venous: The pulmonary veins appear normal.  Shunts: Agitated saline contrast was given intravenously to evaluate for intracardiac shunting.  In comparison to the previous echocardiogram(s): Prior examinations are available and were reviewed for comparison purposes. Compared to the prior TOREY study from 10/7/16, LV systolic function/EF remain preserved, known anterior mitral leaflet prolapse with slight progression of mitral regurgitation.      Summary:   1. Left ventricular ejection fraction, by visual estimation, is 55 to 60%, by 3D volume 59%, LV  ml.   2. Normal global left ventricular systolic function.   3. Normal right ventricular size and function, estimated RVSP 16 mmHg.   4. Mildly enlarged left atrium.   5. Prolapse of the anterior leaflet involving the A2 and A3 scallops.   6. Moderate-to-severe mitral valve regurgitation. There are 2 separate jets, the dominant jet is posteriorly directed with EROA (by PISA) of 0.20 cm2 and regurgitant volume of 39 ml. There is no systolic pulmonary reversal.   7. No left atrial appendage thrombus and normal left atrial appendage velocities.   8. Mild tricuspid regurgitation.   9. Color flow doppler and intravenous injection of agitated saline demonstrates the presence of an intact intra atrial septum.  10. There is no evidence of pericardial effusion.  11. Compared to the priorTEE study from 10/7/16, LV systolic function/EF remain preserved, known anterior mitral leaflet prolapse with slight progression of mitral regurgitation.    Guero Duarte DO Electronically signed on 9/8/2021 at 12:53:57 PM    < end of copied text >    STRESS:    CATH:     ELECTROPHYSIOLOGY:     PAST MEDICAL HISTORY  Non-rheumatic tricuspid valve insufficiency  Mitral valve prolapse  Non-rheumatic mitral regurgitation  Goiter  Hyperthyroidism  Post-therapeutic hypothyroidism  Glaucoma  Graves disease        PAST SURGICAL HISTORY  History of thyroidectomy        SOCIAL HISTORY:    CIGARETTES: current smoker  1 pack per week  ALCOHOL: social  DRUGS: occasional marijuana use    FAMILY HISTORY:  Family history of neoplasm of brain  Family history of breast cancer (Grandparent)      Family History of Cardiovascular Disease:  Yes [  ] No [x  ]  Coronary Artery Disease in first degree relative: Yes [  ] No [ x ]  Sudden Cardiac Death in First degree relative: Yes [  ] No [x  ]    HOME MEDICATIONS:  budesonide 32 mcg/inh nasal aerosol with adapter: nasal once a day (08 Sep 2021 07:54)  Synthroid 112 mcg (0.112 mg) oral tablet: 1 tab(s) orally once a day (07 Oct 2016 11:44)  ZyrTEC 10 mg oral tablet: 1 tab(s) orally once a day (08 Sep 2021 07:53)      CURRENT CARDIAC MEDICATIONS:      CURRENT OTHER MEDICATIONS:      ALLERGIES:   No Known Allergies      REVIEW OF SYMPTOMS:   CONSTITUTIONAL: No fever, no chills, no weight loss, no weight gain, no fatigue   ENMT:  No vertigo; No sinus or throat pain  NECK: No pain or stiffness  CARDIOVASCULAR: + chest pain, no dyspnea, no syncope/presyncope, + palpitations, no dizziness, no Orthopnea, no Paroxsymal nocturnal dyspnea  RESPIRATORY: +Shortness of breath, no cough, no wheezing  : No dysuria, no hematuria   GI: No dark color stool, no nausea, no diarrhea, no constipation, no abdominal pain   NEURO: No headache, no slurred speech   MUSCULOSKELETAL: No joint pain or swelling; No muscle, back, or extremity pain  PSYCH: No agitation, no anxiety.    ALL OTHER REVIEW OF SYSTEMS ARE NEGATIVE.    VITAL SIGNS:  T(C): --  T(F): --  HR: 73 (09-29-22 @ 10:53) (68 - 73)  BP: 115/75 (09-29-22 @ 10:53) (115/75 - 115/75)  RR: 24 (09-29-22 @ 10:46) (24 - 24)  SpO2: 98% (09-29-22 @ 10:53) (98% - 98%)    INTAKE AND OUTPUT:       PHYSICAL EXAM:  Constitutional: Comfortable . No acute distress.   HEENT: Atraumatic and normocephalic , neck is supple . no JVD. No carotid bruit.  CNS: A&Ox3. No focal deficits.   Respiratory: CTAB, unlabored   Cardiovascular: RRR normal s1 s2. No murmur. No rubs or gallop.  Gastrointestinal: Soft, non-tender. +Bowel sounds.   Extremities: 2+ Peripheral Pulses, No clubbing, cyanosis, or edema  Psychiatric: Calm . no agitation.   Skin: Warm and dry, no ulcers on extremities     LABS:                       15.4   10.34 )-----------( 228      ( 29 Sep 2022 11:18 )             44.6       PT/INR - ( 29 Sep 2022 11:18 )   PT: 12.6 sec;   INR: 1.09 ratio     PTT - ( 29 Sep 2022 11:18 )  PTT:30.0 sec            INTERPRETATION OF TELEMETRY:     ECG: NSR with TWI in inferior leads, occasional PVCs  Prior ECG: Yes [  ] No [  ]    RADIOLOGY & ADDITIONAL STUDIES:    X-ray:    CT scan:   MRI:   US:

## 2022-09-29 NOTE — ED PROVIDER NOTE - PROGRESS NOTE DETAILS
Patient feeling better. TTE no acute findings, demonstrates mitral valve prolapse and tricuspid regurgitation which is known. Pending 2nd troponin, if darrian will dc. Montserrat Norris DO Patient feeling better. TTE no acute findings, demonstrates mitral valve prolapse and tricuspid regurgitation which is known. Pending 2nd troponin, if normal will lupis. Montserrat Norris DO 2nd trop neg will dc. Patient aware that TSH low, follows closely with endocrinology who recently decreased her dose of synthroid, informed of need to f/u with endocrinologist. Montserrat Norris DO

## 2022-09-29 NOTE — ED ADULT TRIAGE NOTE - CHIEF COMPLAINT QUOTE
42F c/o chest pain and palpitations onset last night, went to urgent care and sent here. Reports recently ill with Covid, dx 9/16/2022

## 2022-09-29 NOTE — ED PROVIDER NOTE - CLINICAL SUMMARY MEDICAL DECISION MAKING FREE TEXT BOX
Patient with palpitations, chest pressure- EKG shows new T wave inversion lead II, also with frequent PVCs on telemetry monitoring. Will check labs, cxr, thyroid panel, d-dimer r/o PE, monitor on telemetry and reassess. Montserrat Norris DO <-- Click to add NO significant Past Surgical History

## 2022-09-29 NOTE — ED PROVIDER NOTE - CARE PROVIDERS DIRECT ADDRESSES
,[email or web address]_Unknown,[email or web address]

## 2022-09-29 NOTE — ED PROVIDER NOTE - CARE PROVIDER_API CALL
Stuart Sheppard)  Cardiovascular Disease  [address], [address]  [place], [place] [id number]  Phone: [phone]  Fax: [phone]  Follow Up Time: Urgent    Rodolfo Monique)  Surgery; Thoracic and Cardiac Surgery  [address] [place] [address]  Phone: [phone]  Fax: [phone]  Follow Up Time: Urgent

## 2022-09-29 NOTE — ED PROVIDER NOTE - PROVIDER TOKENS
PROVIDER:[TOKEN:[[id number]],FOLLOWUP:[Urgent]],PROVIDER:[TOKEN:[[id number]],FOLLOWUP:[Urgent]] Libtayo Counseling- I discussed with the patient the risks of Libtayo including but not limited to nausea, vomiting, diarrhea, and bone or muscle pain.  The patient verbalized understanding of the proper use and possible adverse effects of Libtayo.  All of the patient's questions and concerns were addressed.

## 2022-09-29 NOTE — ED PROVIDER NOTE - OBJECTIVE STATEMENT
41yo female with PMH Grave's disease s/p thyroidectomy on synthroid, mitral valve prolapse, tricuspid valve insufficiency presenting with palpitations and chest pressure. 43yo female with PMH Grave's disease s/p thyroidectomy on synthroid, mitral valve prolapse, tricuspid valve insufficiency presenting with palpitations and chest pressure x 1 day. patient states that she was diagnosed with COVID about a week ago. Feels like her heart is skipping a beat. No n/v/d, no shortness of breath.

## 2022-09-29 NOTE — CONSULT NOTE ADULT - ASSESSMENT
41 y/o F with PMH current smoker (1 ppWeek) hyperthyroid (s/p thyroidectomy), goiter, Mitral Valve prolapse (has eval appt with Dr. Monique on 10/11) and recent COVID19 infx (positive on 9/16) who presented to urgent care today with palpitations and sent in due to EKG changes. Per patient, she was symptomatic with COVID19 infection and was placed on a medrol dose pack (last dose today). She endorses symptomatic palpitations beginning yesterday prompting visit to urgent care. EKG on arrival with inferior lead TWI, however these were seen on previous EKGS. Denies , back pain, headache, dizziness, SOB, BLANCO, diaphoresis, syncope or N/V.    Of note patient states she recently tried Tepezza IV but was discontinued due to side effects.

## 2022-09-29 NOTE — ED PROVIDER NOTE - NSFOLLOWUPINSTRUCTIONS_ED_ALL_ED_FT
1. Follow up with your cardiologist and your cardiothoracic surgeon as planned.  2.  Return to the Emergency Department for worsening, progressive or any other concerning symptoms.     Chest Pain    Chest pain can be caused by many different conditions which may or may not be dangerous. Causes include heartburn, lung infections, heart attack, blood clot in lungs, skin infections, strain or damage to muscle, cartilage, or bones, etc. In addition to a history and physical examination, an electrocardiogram (ECG) or other lab tests may have been performed to determine the cause of your chest pain. Follow up with your primary care provider or with a cardiologist as instructed.     SEEK IMMEDIATE MEDICAL CARE IF YOU HAVE ANY OF THE FOLLOWING SYMPTOMS: worsening chest pain, coughing up blood, unexplained back/neck/jaw pain, severe abdominal pain, dizziness or lightheadedness, fainting, shortness of breath, sweaty or clammy skin, vomiting, or racing heart beat. These symptoms may represent a serious problem that is an emergency. Do not wait to see if the symptoms will go away. Get medical help right away. Call 911 and do not drive yourself to the hospital.

## 2022-09-30 RX ORDER — BROMPHENIRAMINE MALEATE, PSEUDOEPHEDRINE HYDROCHLORIDE, 2; 30; 10 MG/5ML; MG/5ML; MG/5ML
30-2-10 SYRUP ORAL
Qty: 1 | Refills: 2 | Status: DISCONTINUED | COMMUNITY
Start: 2022-09-22 | End: 2022-09-30

## 2022-09-30 RX ORDER — METHYLPREDNISOLONE 4 MG/1
4 TABLET ORAL
Qty: 1 | Refills: 1 | Status: DISCONTINUED | COMMUNITY
Start: 2022-09-24 | End: 2022-09-30

## 2022-09-30 RX ORDER — PREDNISONE 50 MG/1
50 TABLET ORAL
Qty: 5 | Refills: 0 | Status: DISCONTINUED | COMMUNITY
Start: 2022-03-07 | End: 2022-09-30

## 2022-09-30 RX ORDER — ESCITALOPRAM OXALATE 5 MG/1
5 TABLET ORAL DAILY
Qty: 14 | Refills: 0 | Status: DISCONTINUED | COMMUNITY
Start: 2022-03-25 | End: 2022-09-30

## 2022-09-30 RX ORDER — GUAIFENESIN 600 MG/1
600 TABLET, EXTENDED RELEASE ORAL
Qty: 10 | Refills: 0 | Status: DISCONTINUED | COMMUNITY
Start: 2022-09-22 | End: 2022-09-30

## 2022-10-01 ENCOUNTER — EMERGENCY (EMERGENCY)
Facility: HOSPITAL | Age: 42
LOS: 1 days | Discharge: HOME | End: 2022-10-01
Attending: EMERGENCY MEDICINE
Payer: COMMERCIAL

## 2022-10-01 VITALS — TEMPERATURE: 98 F

## 2022-10-01 VITALS
RESPIRATION RATE: 18 BRPM | HEIGHT: 64 IN | DIASTOLIC BLOOD PRESSURE: 60 MMHG | HEART RATE: 77 BPM | TEMPERATURE: 97 F | OXYGEN SATURATION: 99 % | SYSTOLIC BLOOD PRESSURE: 144 MMHG | WEIGHT: 175.05 LBS

## 2022-10-01 DIAGNOSIS — E89.0 POSTPROCEDURAL HYPOTHYROIDISM: Chronic | ICD-10-CM

## 2022-10-01 LAB
ALBUMIN SERPL ELPH-MCNC: 4.3 G/DL — SIGNIFICANT CHANGE UP (ref 3.3–5.2)
ALP SERPL-CCNC: 96 U/L — SIGNIFICANT CHANGE UP (ref 40–120)
ALT FLD-CCNC: 29 U/L — SIGNIFICANT CHANGE UP
ANION GAP SERPL CALC-SCNC: 11 MMOL/L — SIGNIFICANT CHANGE UP (ref 5–17)
AST SERPL-CCNC: 26 U/L — SIGNIFICANT CHANGE UP
BASOPHILS # BLD AUTO: 0.05 K/UL — SIGNIFICANT CHANGE UP (ref 0–0.2)
BASOPHILS NFR BLD AUTO: 0.5 % — SIGNIFICANT CHANGE UP (ref 0–2)
BILIRUB SERPL-MCNC: <0.2 MG/DL — LOW (ref 0.4–2)
BUN SERPL-MCNC: 17.3 MG/DL — SIGNIFICANT CHANGE UP (ref 8–20)
CALCIUM SERPL-MCNC: 9.3 MG/DL — SIGNIFICANT CHANGE UP (ref 8.4–10.5)
CHLORIDE SERPL-SCNC: 101 MMOL/L — SIGNIFICANT CHANGE UP (ref 98–107)
CO2 SERPL-SCNC: 27 MMOL/L — SIGNIFICANT CHANGE UP (ref 22–29)
CREAT SERPL-MCNC: 0.7 MG/DL — SIGNIFICANT CHANGE UP (ref 0.5–1.3)
EGFR: 112 ML/MIN/1.73M2 — SIGNIFICANT CHANGE UP
EOSINOPHIL # BLD AUTO: 0.15 K/UL — SIGNIFICANT CHANGE UP (ref 0–0.5)
EOSINOPHIL NFR BLD AUTO: 1.5 % — SIGNIFICANT CHANGE UP (ref 0–6)
GLUCOSE SERPL-MCNC: 116 MG/DL — HIGH (ref 70–99)
HCT VFR BLD CALC: 42.8 % — SIGNIFICANT CHANGE UP (ref 34.5–45)
HGB BLD-MCNC: 14.8 G/DL — SIGNIFICANT CHANGE UP (ref 11.5–15.5)
IMM GRANULOCYTES NFR BLD AUTO: 0.2 % — SIGNIFICANT CHANGE UP (ref 0–0.9)
LIDOCAIN IGE QN: 31 U/L — SIGNIFICANT CHANGE UP (ref 22–51)
LYMPHOCYTES # BLD AUTO: 2.94 K/UL — SIGNIFICANT CHANGE UP (ref 1–3.3)
LYMPHOCYTES # BLD AUTO: 28.7 % — SIGNIFICANT CHANGE UP (ref 13–44)
MCHC RBC-ENTMCNC: 31.1 PG — SIGNIFICANT CHANGE UP (ref 27–34)
MCHC RBC-ENTMCNC: 34.6 GM/DL — SIGNIFICANT CHANGE UP (ref 32–36)
MCV RBC AUTO: 89.9 FL — SIGNIFICANT CHANGE UP (ref 80–100)
MONOCYTES # BLD AUTO: 0.93 K/UL — HIGH (ref 0–0.9)
MONOCYTES NFR BLD AUTO: 9.1 % — SIGNIFICANT CHANGE UP (ref 2–14)
NEUTROPHILS # BLD AUTO: 6.14 K/UL — SIGNIFICANT CHANGE UP (ref 1.8–7.4)
NEUTROPHILS NFR BLD AUTO: 60 % — SIGNIFICANT CHANGE UP (ref 43–77)
PLATELET # BLD AUTO: 229 K/UL — SIGNIFICANT CHANGE UP (ref 150–400)
POTASSIUM SERPL-MCNC: 4.3 MMOL/L — SIGNIFICANT CHANGE UP (ref 3.5–5.3)
POTASSIUM SERPL-SCNC: 4.3 MMOL/L — SIGNIFICANT CHANGE UP (ref 3.5–5.3)
PROT SERPL-MCNC: 7.4 G/DL — SIGNIFICANT CHANGE UP (ref 6.6–8.7)
RBC # BLD: 4.76 M/UL — SIGNIFICANT CHANGE UP (ref 3.8–5.2)
RBC # FLD: 11.9 % — SIGNIFICANT CHANGE UP (ref 10.3–14.5)
SODIUM SERPL-SCNC: 139 MMOL/L — SIGNIFICANT CHANGE UP (ref 135–145)
TROPONIN T SERPL-MCNC: <0.01 NG/ML — SIGNIFICANT CHANGE UP (ref 0–0.06)
TSH SERPL-MCNC: 0.19 UIU/ML — LOW (ref 0.27–4.2)
WBC # BLD: 10.23 K/UL — SIGNIFICANT CHANGE UP (ref 3.8–10.5)
WBC # FLD AUTO: 10.23 K/UL — SIGNIFICANT CHANGE UP (ref 3.8–10.5)

## 2022-10-01 PROCEDURE — 93005 ELECTROCARDIOGRAM TRACING: CPT

## 2022-10-01 PROCEDURE — 99283 EMERGENCY DEPT VISIT LOW MDM: CPT

## 2022-10-01 PROCEDURE — 93010 ELECTROCARDIOGRAM REPORT: CPT

## 2022-10-01 PROCEDURE — 80053 COMPREHEN METABOLIC PANEL: CPT

## 2022-10-01 PROCEDURE — 84484 ASSAY OF TROPONIN QUANT: CPT

## 2022-10-01 PROCEDURE — 84443 ASSAY THYROID STIM HORMONE: CPT

## 2022-10-01 PROCEDURE — 85025 COMPLETE CBC W/AUTO DIFF WBC: CPT

## 2022-10-01 PROCEDURE — 83690 ASSAY OF LIPASE: CPT

## 2022-10-01 PROCEDURE — 99285 EMERGENCY DEPT VISIT HI MDM: CPT

## 2022-10-01 PROCEDURE — 36415 COLL VENOUS BLD VENIPUNCTURE: CPT

## 2022-10-01 NOTE — ED ADULT NURSE NOTE - OBJECTIVE STATEMENT
patient seen a few days ago in the ER and discharged. patient back in ER with pain under the left breast and dizziness. patient is A&OX4, VSS. on cardiac monitor continuously. continuing to monitor.

## 2022-10-01 NOTE — ED PROVIDER NOTE - CLINICAL SUMMARY MEDICAL DECISION MAKING FREE TEXT BOX
pt with negative cardiac work-up outpatient 2 days ago, thyroid levels trending back towards normal limits when compared to outpatient labs, trop negative today, ekg grossly unchanged from prior, stable for d/c

## 2022-10-01 NOTE — ED ADULT NURSE NOTE - MODE OF DISCHARGE
HISTORY OF PRESENT ILLNESS:    23 year old White female presents with epigastric pain    Patient presents with a five-day history of insidious onset of epigastric pain. She states it's an ache. She states it's accompanied by some mild anorexia but she's not had any vomiting, diarrhea, melena, hematochezia. She's not had fever, chills. She's been taking some ibuprofen this week for headaches but this was after the abdominal pain started. She's not been drinking alcohol. She denies relieving or exacerbating features with regards to food intake. She's not previous had surgery on her abdomen. She does not relate any other relieving or exacerbating features. Last menstrual period was this past Sunday. She has no urinary symptoms.    I have reviewed the patient's medications and allergies, past medical, surgical, social and family history, updating these as appropriate.  See Histories section of the EMR for a display of this information.    The remainder of the 15-point review of systems is negative except as outlined in the HPI.    OBJECTIVE:    Blood pressure 108/72, pulse 80, temperature 98.6 °F (37 °C), temperature source Tympanic, SpO2 98 %.    General:  Alert, in no apparent distress. Mood and affect appropriate.    Abdomen: Normoactive bowel sounds in all 4 quadrants. Her abdomen is very soft. No hepatosplenomegaly is noted. She has mild tenderness palpation in the epigastrium. There is no guarding or rebound. No pelvic rock her heel strike tenderness noted.    ASSESSMENT/PLAN:    Gastritis. Recommend she start omeprazole 20 mg daily. In the interim, take antacids. Liberalize fluid intake and advance diet as tolerated. Avoid anti-inflammatories. Return if persistent acutely worsening symptoms.   Ambulatory

## 2022-10-01 NOTE — ED ADULT TRIAGE NOTE - CHIEF COMPLAINT QUOTE
pt c/o mid chest pain and pain to left chest, + nausea started a few minutes ago HX MVP  was told to stop thyroid meds yesterday bc levels were too high. denies Blood thinners  A&Ox3, resp wnl pt c/o mid chest pain and pain to left chest, + nausea started a few minutes ago HX MVP  was told to stop thyroid meds yesterday bc levels were too high. denies Blood thinners  A&Ox3, resp wnl, anxious

## 2022-10-01 NOTE — ED ADULT NURSE NOTE - CAS ELECT INFOMATION PROVIDED
Vital Signs Last 24 Hrs  T(C): 36.6 (02 Feb 2022 15:27), Max: 36.7 (02 Feb 2022 10:12)  T(F): 97.9 (02 Feb 2022 15:27), Max: 98 (02 Feb 2022 10:12)  HR: 91 (02 Feb 2022 15:27) (74 - 91)  BP: 126/63 (02 Feb 2022 15:27) (126/63 - 131/70)  RR: 17 (02 Feb 2022 15:27) (17 - 17)  SpO2: 96% (02 Feb 2022 15:27) (96% - 97%)    GENERAL: NAD, lying in bed comfortably  HEAD:  Atraumatic, Normocephalic  EYES: EOMI, PERRLA, conjunctiva and sclera clear  ENT: Moist mucous membranes  NECK: Supple, No JVD  CHEST/LUNG: Clear to auscultation bilaterally; No rales, rhonchi, wheezing, or rubs. Unlabored respirations  HEART: Regular rate and rhythm; No murmurs, rubs, or gallops  ABDOMEN: Bowel sounds present; Soft, Nontender, Nondistended. No hepatomegaly  EXTREMITIES:  2+ Peripheral Pulses, brisk capillary refill. No clubbing, cyanosis, or edema  NERVOUS SYSTEM:  Alert & Oriented X3, speech clear. No deficits   MSK: Tenderness and LROM in left elbow and left hip  SKIN: No rashes or lesions Wilner BOWDEN/DC instructions

## 2022-10-01 NOTE — ED ADULT NURSE NOTE - CHIEF COMPLAINT QUOTE
pt c/o mid chest pain and pain to left chest, + nausea started a few minutes ago HX MVP  was told to stop thyroid meds yesterday bc levels were too high. denies Blood thinners  A&Ox3, resp wnl, anxious

## 2022-10-01 NOTE — ED PROVIDER NOTE - PATIENT PORTAL LINK FT
You can access the FollowMyHealth Patient Portal offered by [facility] by registering at the following website: [email or web address]. By joining [facility]’s FollowMyHealth portal, you will also be able to view your health information using other applications (apps) compatible with our system.

## 2022-10-01 NOTE — ED ADULT NURSE REASSESSMENT NOTE - NS ED NURSE REASSESS COMMENT FT1
Assumed care of pt at 19:15 as stated in report from LAURA Antonio. Charting as noted. Patient A&O x4, denies pain/discomfort, denies CP/SOB. Updated on the plan of care. Call bell within reach, bed locked in lowest position. IV site flushed w/ NS. No redness, swelling or pain noted to site. No signs of acute distress noted, safety maintained. Pt remains on CM in NSR.
pt on cardiac monitor in NSR, pt resting comfortably showing no signs of respiratory distress or pain, pt is calm and cooperative

## 2022-10-03 ENCOUNTER — APPOINTMENT (OUTPATIENT)
Dept: INTERNAL MEDICINE | Facility: CLINIC | Age: 42
End: 2022-10-03

## 2022-10-03 PROCEDURE — 36415 COLL VENOUS BLD VENIPUNCTURE: CPT

## 2022-10-04 LAB
T3 SERPL-MCNC: 90 NG/DL
T3FREE SERPL-MCNC: 2.66 PG/ML
T4 FREE SERPL-MCNC: 1.6 NG/DL
T4 SERPL-MCNC: 10.9 UG/DL
TSH SERPL-ACNC: 0.17 UIU/ML

## 2022-10-11 ENCOUNTER — APPOINTMENT (OUTPATIENT)
Dept: CARDIOTHORACIC SURGERY | Facility: CLINIC | Age: 42
End: 2022-10-11

## 2022-10-11 PROCEDURE — 99204 OFFICE O/P NEW MOD 45 MIN: CPT

## 2022-10-11 RX ORDER — POLYETHYLENE GLYCOL 3350 17 G/17G
17 POWDER, FOR SOLUTION ORAL DAILY
Qty: 30 | Refills: 0 | Status: COMPLETED | COMMUNITY
Start: 2022-02-01 | End: 2022-10-11

## 2022-10-11 RX ORDER — PSYLLIUM SEED
48.57 PACKET (EA) ORAL
Qty: 1 | Refills: 0 | Status: COMPLETED | COMMUNITY
Start: 2020-04-03 | End: 2022-10-11

## 2022-10-12 ENCOUNTER — NON-APPOINTMENT (OUTPATIENT)
Age: 42
End: 2022-10-12

## 2022-10-12 ENCOUNTER — APPOINTMENT (OUTPATIENT)
Dept: CARDIOLOGY | Facility: CLINIC | Age: 42
End: 2022-10-12

## 2022-10-12 VITALS
TEMPERATURE: 98.5 F | HEART RATE: 88 BPM | WEIGHT: 178 LBS | HEIGHT: 63 IN | OXYGEN SATURATION: 98 % | SYSTOLIC BLOOD PRESSURE: 116 MMHG | BODY MASS INDEX: 31.54 KG/M2 | DIASTOLIC BLOOD PRESSURE: 78 MMHG

## 2022-10-12 PROCEDURE — 99214 OFFICE O/P EST MOD 30 MIN: CPT | Mod: 25

## 2022-10-12 PROCEDURE — 93000 ELECTROCARDIOGRAM COMPLETE: CPT

## 2022-10-12 NOTE — ASSESSMENT
[FreeTextEntry1] : Mitral valve prolapse with A2-A3 redundant and thickened flail A2 scallop. FC I  , moderate MR in 2020  , EF 45-50% \par moderate to severe MR , EF 55-60% by TOREY \par \par CT surgery will follow in 6 months severe of MR and  EF and decide on surgical intervention \par if SoB, LE edema or other symptoms, contact the office for earlier appoitment \par 6 months. \par

## 2022-10-12 NOTE — HISTORY OF PRESENT ILLNESS
[FreeTextEntry1] : 36 year old female with a history of goiter/hyperthyroidism s/p thyroidectomy in 2013 now on synthroid therapy. I follow her for mitral valve prolapse with moderate MR ( A1and A2 prolapse ) with mild to moderate TR and  no pulmonary hypertension on last echo and preserved  EF.  She c/o of SOB FC I symptoms but stable , no orthopnea or PND, no LE edema. No dizziness, palpitations or syncope.\par She sometimes c/o of panic attacks ( tingling and numbness of Left LE and UE , head clouding , heaviness  overall) \par \par 9/9/2020 \par Has not been seen for 2 years\par c/o of SOB on more than ordinary effort ( FC I) \par Gained weight then lost it again \par c/o of palpitations at night, feeling  like her heart stops and restart again , happened twice not associated with dizziness or syncope \par c/o being fatigued at times\par \par 8/11/2021\par Seen last year\par Echo in 10/2020 showed moderate MR, MV prolapse , posteriorly directed MR. \par SOB, occasional , at any time , blamed on asthma most of the time \par c/o occasionally multiple symptoms, (fatigue/numbness) \par \par 2/16/2021\par REturns for follow up \par TOREY showed moderate to severe MR , prolapse of the anterior leaflets involving the A2 and A3 \par no exertional chest pain or  SOB, palpitations, dizziness or syncope\par EF 50-55% \par \par 10/12/2021\par Returns for follow up \par Had COVID in 9/2022\par had palpitations and TSH was low and levothyroxine dose was lowered now to 50 mcg\par Saw CT surgery and decision was to follow up in 6 months with echo since moderate to severe MR with preserved EF \par following with endocrinology to adjust her thyroid meds. \par no exertional chest pain or  SOB, palpitations, dizziness or syncope \par \par \par

## 2022-10-13 NOTE — HISTORY OF PRESENT ILLNESS
[FreeTextEntry1] : Ms. CERRATO is a 42 year old female referred by Dr. Sheppard who presents for consultation. Her past medical history includes Graves Disease (thyroidectomy), IBS, anxiety, dental infection and abscess history, current smoker and tricuspid as well as mitral valve disease. 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 She is having panic and anxiety anxiety attacks. Her thyroid levels have been fluctuation. She does not have an Endocrinologist and is managed by her PCP. She has episodes of fatigue and has shortness of breath when going up stairs. She denies any palpitations, or lower extremity edema. \par \par

## 2022-10-13 NOTE — CONSULT LETTER
[Dear  ___] : Dear  [unfilled], [Consult Letter:] : I had the pleasure of evaluating your patient, [unfilled]. [Please see my note below.] : Please see my note below. [Consult Closing:] : Thank you very much for allowing me to participate in the care of this patient.  If you have any questions, please do not hesitate to contact me. [Sincerely,] : Sincerely, [FreeTextEntry2] : Stuart Sheppard MD [FreeTextEntry3] : Rodolfo Monique MD\par  of Cardiothoracic Surgery\par [facility]\par [address] \[place] [address]\par [phone]\par

## 2022-10-13 NOTE — REVIEW OF SYSTEMS
[Feeling Tired] : feeling tired [SOB on Exertion] : shortness of breath during exertion [Negative] : Heme/Lymph [Feeling Poorly] : not feeling poorly [Chest Pain] : no chest pain [Palpitations] : no palpitations [Lower Ext Edema] : no lower extremity edema [Shortness Of Breath] : no shortness of breath [Cough] : no cough

## 2022-10-13 NOTE — PHYSICAL EXAM
[General Appearance - Well Nourished] : well nourished [General Appearance - Well Developed] : well developed [Sclera] : the sclera and conjunctiva were normal [Outer Ear] : the ears and nose were normal in appearance [Neck Appearance] : the appearance of the neck was normal [Respiration, Rhythm And Depth] : normal respiratory rhythm and effort [Auscultation Breath Sounds / Voice Sounds] : lungs were clear to auscultation bilaterally [Heart Rate And Rhythm] : heart rate was normal and rhythm regular [FreeTextEntry1] : NYHAC I   Grade 2/6 systolic murmur [Examination Of The Chest] : the chest was normal in appearance [Abnormal Walk] : normal gait [Skin Color & Pigmentation] : normal skin color and pigmentation [Sensation] : the sensory exam was normal to light touch and pinprick [Motor Exam] : the motor exam was normal [Oriented To Time, Place, And Person] : oriented to person, place, and time [Impaired Insight] : insight and judgment were intact

## 2022-10-13 NOTE — ASSESSMENT
[FreeTextEntry1] : Ms Young reports to the office today for review of recent imaging consistent with mitral valve regurgitation. She has no overt symptoms at this time. On imaging there is mitral valve regurgitation. She has been drinking increased fluids up to gallon a day. Although she is asymptomatic there is clear anterior prolapse and intervention could be considered in the future. \par She has substantial thyroid disease concurrently and is in the process of obtaining Endocrinology Consultation. I am recommending follow up with Endocrine for her thyroid and repeat imaging in months. Can follow up after endocrinology has stabilized her thyroid disease. \par \par PLAN:\par - Endocrinology Consultation\par - Transthoracic Echocardiogram in 6 months\par - Return to care after imaging sooner if symptomatic\par - Limit fluid intake and salt \par \par \par \par \par \par \par \par Devan ALFORD NP am scribing for and in the presence of Dr. Monique the following sections HISTORY OF PRESENT ILLNESS, PAST MEDICAL/FAMILY/SOCIAL HISTORY; REVIEW OF SYSTEMS; VITAL SIGNS; PHYSICAL EXAM; DISPOSITION.\par \par "I personally performed the services described in the documentation, reviewed the documentation recorded by the scribe in my presence and accurately and completely records my words and actions."\par

## 2022-10-13 NOTE — DATA REVIEWED
[FreeTextEntry1] : Transesophageal Echocardiogram from 09/08/2021 at [facility] \par - LVEF 55-60%\par - Moderate to severe mitral valve regurgitation, Prolapse of the anterior leaflet including the A2 and A3 scallops. \par - Structurally normal tricuspid valve with mild tricuspid regurgitation.

## 2022-10-21 ENCOUNTER — APPOINTMENT (OUTPATIENT)
Dept: INTERNAL MEDICINE | Facility: CLINIC | Age: 42
End: 2022-10-21

## 2022-10-21 PROCEDURE — 36415 COLL VENOUS BLD VENIPUNCTURE: CPT

## 2022-10-23 LAB
T3 SERPL-MCNC: 81 NG/DL
T4 SERPL-MCNC: 9.2 UG/DL
TSH SERPL-ACNC: 1.7 UIU/ML

## 2022-10-27 ENCOUNTER — APPOINTMENT (OUTPATIENT)
Dept: INTERNAL MEDICINE | Facility: CLINIC | Age: 42
End: 2022-10-27

## 2022-10-27 VITALS
HEIGHT: 63 IN | BODY MASS INDEX: 31.54 KG/M2 | RESPIRATION RATE: 15 BRPM | SYSTOLIC BLOOD PRESSURE: 122 MMHG | OXYGEN SATURATION: 97 % | TEMPERATURE: 98.5 F | DIASTOLIC BLOOD PRESSURE: 70 MMHG | HEART RATE: 86 BPM | WEIGHT: 178 LBS

## 2022-10-27 DIAGNOSIS — R53.83 OTHER FATIGUE: ICD-10-CM

## 2022-10-27 DIAGNOSIS — R13.12 DYSPHAGIA, OROPHARYNGEAL PHASE: ICD-10-CM

## 2022-10-27 PROCEDURE — 36415 COLL VENOUS BLD VENIPUNCTURE: CPT

## 2022-10-27 PROCEDURE — 99214 OFFICE O/P EST MOD 30 MIN: CPT | Mod: 25

## 2022-10-27 NOTE — ASSESSMENT
[FreeTextEntry1] : Marimar is a 40 yo F w PMHx graves disease s/p thyroidectomy now hypothyroid, current smoker, mitral valve prolapse, IBS\par Patient has cut down smoking \par Patient states that she feels very fatigued since she had COVID-19. States that she is getting tingling on her head, sometimes associated to eating sugary treats and then felt like her mouth was dry. \par She feels that after she started using tapezza \par Sometimes feels like she cannot swallow. US thyroid did not show any residual tissue that might be causing obstruction. She has booked pt with GI for possible endoscopy/swallowing study. \par \par \par Dyphagia \par Fu GI, would recommend barrium study/EGD \par \par Fatigue \par Likely associated to underlying thyroid issues \par She is euthyroid at the moment and is to cont 50 mcg levothyroxine qd \par Has booked appt with endo for next year, will cont to manage for now \par Fu hg/hct/hep/hiv/mono\par Advised to quit smoking \par \par Refused influenza vaccine \par \par rto 3 mo, sooner if needed

## 2022-10-27 NOTE — REVIEW OF SYSTEMS
[Fatigue] : fatigue [Dryness] : dryness  [Vision Problems] : vision problems [Hoarseness] : hoarseness [Palpitations] : palpitations [Anxiety] : anxiety [Negative] : Heme/Lymph

## 2022-10-27 NOTE — PHYSICAL EXAM
[Normal Sclera/Conjunctiva] : normal sclera/conjunctiva [PERRL] : pupils equal round and reactive to light [EOMI] : extraocular movements intact [Coordination Grossly Intact] : coordination grossly intact [No Focal Deficits] : no focal deficits [Normal Gait] : normal gait [Normal] : affect was normal and insight and judgment were intact [de-identified] : + bulging eyes bilaterally  [de-identified] : Patches of hair los, negative pull test

## 2022-10-27 NOTE — HISTORY OF PRESENT ILLNESS
[FreeTextEntry1] : Offer flu, hypothyroidism, fatigue  [de-identified] : Marimar is a 42 yo F w PMHx graves disease s/p thyroidectomy now hypothyroid, current smoker, mitral valve prolapse, IBS\par Patient has cut down smoking \par Patient states that she feels very fatigued since she had COVID-19. States that she is getting tingling on her head, sometimes associated to eating sugary treats and then felt like her mouth was dry. \par She feels that after she started using tapezza \par Sometimes feels like she cannot swallow. US thyroid did not show any residual tissue that might be causing obstruction. She has booked pt with GI for possible endoscopy/swallowing study. \par \par

## 2022-11-01 ENCOUNTER — NON-APPOINTMENT (OUTPATIENT)
Age: 42
End: 2022-11-01

## 2022-11-01 LAB
ALBUMIN SERPL ELPH-MCNC: 4.6 G/DL
ALP BLD-CCNC: 88 U/L
ALT SERPL-CCNC: 23 U/L
ANION GAP SERPL CALC-SCNC: 15 MMOL/L
APPEARANCE: ABNORMAL
AST SERPL-CCNC: 25 U/L
BACTERIA: NEGATIVE
BASOPHILS # BLD AUTO: 0.04 K/UL
BASOPHILS NFR BLD AUTO: 0.7 %
BILIRUB SERPL-MCNC: 0.2 MG/DL
BILIRUBIN URINE: NEGATIVE
BLOOD URINE: NEGATIVE
BUN SERPL-MCNC: 16 MG/DL
CALCIUM SERPL-MCNC: 9.3 MG/DL
CALCIUM SERPL-MCNC: 9.3 MG/DL
CHLORIDE SERPL-SCNC: 101 MMOL/L
CO2 SERPL-SCNC: 22 MMOL/L
COLOR: YELLOW
CREAT SERPL-MCNC: 0.95 MG/DL
CRP SERPL-MCNC: <3 MG/L
EGFR: 78 ML/MIN/1.73M2
ENA JO1 AB SER IA-ACNC: <0.2 AL
EOSINOPHIL # BLD AUTO: 0.12 K/UL
EOSINOPHIL NFR BLD AUTO: 2.1 %
ESTIMATED AVERAGE GLUCOSE: 131 MG/DL
FOLATE SERPL-MCNC: 12.2 NG/ML
GLUCOSE QUALITATIVE U: NEGATIVE
GLUCOSE SERPL-MCNC: 87 MG/DL
HAV IGM SER QL: NONREACTIVE
HBA1C MFR BLD HPLC: 6.2 %
HBV CORE IGM SER QL: NONREACTIVE
HBV SURFACE AG SER QL: NONREACTIVE
HCT VFR BLD CALC: 43.8 %
HCV AB SER QL: NONREACTIVE
HCV S/CO RATIO: 0.07 S/CO
HETEROPH AB SER QL: NEGATIVE
HGB BLD-MCNC: 14.4 G/DL
HIV1+2 AB SPEC QL IA.RAPID: NONREACTIVE
HYALINE CASTS: 3 /LPF
IMM GRANULOCYTES NFR BLD AUTO: 0.2 %
IRON SATN MFR SERPL: 23 %
IRON SERPL-MCNC: 88 UG/DL
KETONES URINE: NEGATIVE
LEUKOCYTE ESTERASE URINE: NEGATIVE
LYMPHOCYTES # BLD AUTO: 2.25 K/UL
LYMPHOCYTES NFR BLD AUTO: 38.7 %
MAN DIFF?: NORMAL
MCHC RBC-ENTMCNC: 31 PG
MCHC RBC-ENTMCNC: 32.9 GM/DL
MCV RBC AUTO: 94.2 FL
MICROSCOPIC-UA: NORMAL
MONOCYTES # BLD AUTO: 0.49 K/UL
MONOCYTES NFR BLD AUTO: 8.4 %
NEUTROPHILS # BLD AUTO: 2.91 K/UL
NEUTROPHILS NFR BLD AUTO: 49.9 %
NITRITE URINE: NEGATIVE
PARATHYROID HORMONE INTACT: 36 PG/ML
PH URINE: 6.5
PLATELET # BLD AUTO: 219 K/UL
POTASSIUM SERPL-SCNC: 4.4 MMOL/L
PROT SERPL-MCNC: 7.4 G/DL
PROTEIN URINE: NORMAL
RBC # BLD: 4.65 M/UL
RBC # FLD: 12.4 %
RED BLOOD CELLS URINE: 1 /HPF
RHEUMATOID FACT SER QL: <10 IU/ML
SODIUM SERPL-SCNC: 138 MMOL/L
SPECIFIC GRAVITY URINE: 1.02
SQUAMOUS EPITHELIAL CELLS: 8 /HPF
TIBC SERPL-MCNC: 379 UG/DL
UIBC SERPL-MCNC: 291 UG/DL
UROBILINOGEN URINE: NORMAL
VIT B12 SERPL-MCNC: 594 PG/ML
WBC # FLD AUTO: 5.82 K/UL
WHITE BLOOD CELLS URINE: 3 /HPF

## 2022-11-02 LAB — ANA SER IF-ACNC: NEGATIVE

## 2022-11-07 ENCOUNTER — OFFICE (OUTPATIENT)
Dept: URBAN - METROPOLITAN AREA CLINIC 100 | Facility: CLINIC | Age: 42
Setting detail: OPHTHALMOLOGY
End: 2022-11-07
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
## 2022-11-07 LAB — PTH RELATED PROT SERPL-MCNC: <2 PMOL/L

## 2022-11-07 PROCEDURE — NO SHOW FE NO SHOW FEE: Performed by: OPHTHALMOLOGY

## 2022-12-02 ENCOUNTER — APPOINTMENT (OUTPATIENT)
Dept: INTERNAL MEDICINE | Facility: CLINIC | Age: 42
End: 2022-12-02

## 2022-12-02 VITALS
WEIGHT: 172 LBS | HEART RATE: 73 BPM | HEIGHT: 63 IN | RESPIRATION RATE: 15 BRPM | OXYGEN SATURATION: 100 % | SYSTOLIC BLOOD PRESSURE: 115 MMHG | TEMPERATURE: 97 F | DIASTOLIC BLOOD PRESSURE: 69 MMHG | BODY MASS INDEX: 30.48 KG/M2

## 2022-12-02 DIAGNOSIS — H60.90 UNSPECIFIED OTITIS EXTERNA, UNSPECIFIED EAR: ICD-10-CM

## 2022-12-02 PROCEDURE — 99213 OFFICE O/P EST LOW 20 MIN: CPT

## 2022-12-04 NOTE — PHYSICAL EXAM
[Normal Sclera/Conjunctiva] : normal sclera/conjunctiva [PERRL] : pupils equal round and reactive to light [EOMI] : extraocular movements intact [Coordination Grossly Intact] : coordination grossly intact [No Focal Deficits] : no focal deficits [Normal Gait] : normal gait [Normal] : affect was normal and insight and judgment were intact [de-identified] : + bulging eyes bilaterally  [de-identified] : Patches of hair los, negative pull test

## 2022-12-04 NOTE — ASSESSMENT
[FreeTextEntry1] : Marimar is a 43 yo F w PMHx graves disease s/p thyroidectomy now hypothyroid, current smoker, mitral valve prolapse, IBS\par Went to GI Dr. Parra, will be going for EGD on 12/9 \par Struggling with constipation and abd discomfort \par c/o L sided ear pain when she moves pinna. \par \par Ciprodex for otitis externa

## 2022-12-04 NOTE — HISTORY OF PRESENT ILLNESS
[FreeTextEntry1] : fatigue, dysphagia  [de-identified] : Marimar is a 43 yo F w PMHx graves disease s/p thyroidectomy now hypothyroid, current smoker, mitral valve prolapse, IBS\par Went to GI Dr. Parra, will be going for EGD on 12/9 \par Struggling with constipation and abd discomfort \par c/o L sided ear pain when she moves pinna.

## 2022-12-13 ENCOUNTER — NON-APPOINTMENT (OUTPATIENT)
Age: 42
End: 2022-12-13

## 2022-12-25 ENCOUNTER — NON-APPOINTMENT (OUTPATIENT)
Age: 42
End: 2022-12-25

## 2023-01-13 ENCOUNTER — NON-APPOINTMENT (OUTPATIENT)
Age: 43
End: 2023-01-13

## 2023-01-13 NOTE — ED PROVIDER NOTE - GASTROINTESTINAL, MLM
Number Of Passes Step 6: 0 Consent: Written consent obtained, risks reviewed including but not limited to crusting, scabbing, blistering, scarring, darker or lighter pigmentary change, bruising, and/or incomplete response. Solution Override Treatment Number: 1 Tip: Hydropeel Tip, Clear Tip: Hydropeel Tip, Blue Post-Care Instructions: I reviewed with the patient in detail post-care instructions. Patient should stay away from the sun and wear sun protection until treated areas are fully healed. Procedure: Extraction Indication: anti-aging Tip: Hydropeel Tip, Teal Procedure: Exfoliation Solution: Beta-HD Procedure: Fusion Tip Override Location: face Solution: Activ-4 Procedure: Extend and Protect Solution: GlySal 7.5% Abdomen soft, non-tender, no guarding.

## 2023-01-18 ENCOUNTER — EMERGENCY (EMERGENCY)
Facility: HOSPITAL | Age: 43
LOS: 1 days | Discharge: HOME | End: 2023-01-18
Attending: EMERGENCY MEDICINE
Payer: COMMERCIAL

## 2023-01-18 VITALS
HEART RATE: 80 BPM | TEMPERATURE: 97 F | OXYGEN SATURATION: 99 % | RESPIRATION RATE: 18 BRPM | SYSTOLIC BLOOD PRESSURE: 113 MMHG | DIASTOLIC BLOOD PRESSURE: 82 MMHG

## 2023-01-18 DIAGNOSIS — E89.0 POSTPROCEDURAL HYPOTHYROIDISM: Chronic | ICD-10-CM

## 2023-01-18 PROCEDURE — 99285 EMERGENCY DEPT VISIT HI MDM: CPT

## 2023-01-18 PROCEDURE — 93010 ELECTROCARDIOGRAM REPORT: CPT

## 2023-01-18 NOTE — ED ADULT TRIAGE NOTE - CHIEF COMPLAINT QUOTE
Pt with PMHx mitral valve prolapse presents to ED c/o palpitations, weakness and nausea. States that this has been going on for a couple day but is worse today. Denies SOB. EKG done in triage. Pt with PMHx mitral valve prolapse presents to ED c/o palpitations, weakness and nausea. States that this has been going on for a couple day but is worse today. EKG done in triage.

## 2023-01-19 ENCOUNTER — NON-APPOINTMENT (OUTPATIENT)
Age: 43
End: 2023-01-19

## 2023-01-19 ENCOUNTER — APPOINTMENT (OUTPATIENT)
Dept: INTERNAL MEDICINE | Facility: CLINIC | Age: 43
End: 2023-01-19
Payer: COMMERCIAL

## 2023-01-19 VITALS
HEART RATE: 647 BPM | SYSTOLIC BLOOD PRESSURE: 103 MMHG | TEMPERATURE: 98 F | RESPIRATION RATE: 17 BRPM | DIASTOLIC BLOOD PRESSURE: 59 MMHG | OXYGEN SATURATION: 98 %

## 2023-01-19 VITALS
SYSTOLIC BLOOD PRESSURE: 102 MMHG | HEIGHT: 63 IN | WEIGHT: 168 LBS | TEMPERATURE: 97.2 F | OXYGEN SATURATION: 100 % | BODY MASS INDEX: 29.77 KG/M2 | DIASTOLIC BLOOD PRESSURE: 71 MMHG | HEART RATE: 85 BPM

## 2023-01-19 LAB
ALBUMIN SERPL ELPH-MCNC: 4.3 G/DL — SIGNIFICANT CHANGE UP (ref 3.3–5.2)
ALP SERPL-CCNC: 72 U/L — SIGNIFICANT CHANGE UP (ref 40–120)
ALT FLD-CCNC: 12 U/L — SIGNIFICANT CHANGE UP
ANION GAP SERPL CALC-SCNC: 11 MMOL/L — SIGNIFICANT CHANGE UP (ref 5–17)
AST SERPL-CCNC: 20 U/L — SIGNIFICANT CHANGE UP
BASOPHILS # BLD AUTO: 0.05 K/UL — SIGNIFICANT CHANGE UP (ref 0–0.2)
BASOPHILS NFR BLD AUTO: 0.5 % — SIGNIFICANT CHANGE UP (ref 0–2)
BILIRUB SERPL-MCNC: 0.3 MG/DL — LOW (ref 0.4–2)
BUN SERPL-MCNC: 16.5 MG/DL — SIGNIFICANT CHANGE UP (ref 8–20)
CALCIUM SERPL-MCNC: 8.5 MG/DL — SIGNIFICANT CHANGE UP (ref 8.4–10.5)
CHLORIDE SERPL-SCNC: 103 MMOL/L — SIGNIFICANT CHANGE UP (ref 96–108)
CO2 SERPL-SCNC: 27 MMOL/L — SIGNIFICANT CHANGE UP (ref 22–29)
CREAT SERPL-MCNC: 0.79 MG/DL — SIGNIFICANT CHANGE UP (ref 0.5–1.3)
EGFR: 97 ML/MIN/1.73M2 — SIGNIFICANT CHANGE UP
EOSINOPHIL # BLD AUTO: 0.06 K/UL — SIGNIFICANT CHANGE UP (ref 0–0.5)
EOSINOPHIL NFR BLD AUTO: 0.6 % — SIGNIFICANT CHANGE UP (ref 0–6)
GLUCOSE SERPL-MCNC: 92 MG/DL — SIGNIFICANT CHANGE UP (ref 70–99)
HBA1C MFR BLD HPLC: 5.7
HCG SERPL-ACNC: <4 MIU/ML — SIGNIFICANT CHANGE UP
HCT VFR BLD CALC: 41.5 % — SIGNIFICANT CHANGE UP (ref 34.5–45)
HGB BLD-MCNC: 14 G/DL — SIGNIFICANT CHANGE UP (ref 11.5–15.5)
IMM GRANULOCYTES NFR BLD AUTO: 0.1 % — SIGNIFICANT CHANGE UP (ref 0–0.9)
LYMPHOCYTES # BLD AUTO: 38.9 % — SIGNIFICANT CHANGE UP (ref 13–44)
LYMPHOCYTES # BLD AUTO: 4.03 K/UL — HIGH (ref 1–3.3)
MCHC RBC-ENTMCNC: 31 PG — SIGNIFICANT CHANGE UP (ref 27–34)
MCHC RBC-ENTMCNC: 33.7 GM/DL — SIGNIFICANT CHANGE UP (ref 32–36)
MCV RBC AUTO: 92 FL — SIGNIFICANT CHANGE UP (ref 80–100)
MONOCYTES # BLD AUTO: 0.74 K/UL — SIGNIFICANT CHANGE UP (ref 0–0.9)
MONOCYTES NFR BLD AUTO: 7.1 % — SIGNIFICANT CHANGE UP (ref 2–14)
NEUTROPHILS # BLD AUTO: 5.46 K/UL — SIGNIFICANT CHANGE UP (ref 1.8–7.4)
NEUTROPHILS NFR BLD AUTO: 52.8 % — SIGNIFICANT CHANGE UP (ref 43–77)
NT-PROBNP SERPL-SCNC: 142 PG/ML — SIGNIFICANT CHANGE UP (ref 0–300)
PLATELET # BLD AUTO: 217 K/UL — SIGNIFICANT CHANGE UP (ref 150–400)
POTASSIUM SERPL-MCNC: 4.1 MMOL/L — SIGNIFICANT CHANGE UP (ref 3.5–5.3)
POTASSIUM SERPL-SCNC: 4.1 MMOL/L — SIGNIFICANT CHANGE UP (ref 3.5–5.3)
PROT SERPL-MCNC: 7.1 G/DL — SIGNIFICANT CHANGE UP (ref 6.6–8.7)
RBC # BLD: 4.51 M/UL — SIGNIFICANT CHANGE UP (ref 3.8–5.2)
RBC # FLD: 13.1 % — SIGNIFICANT CHANGE UP (ref 10.3–14.5)
SODIUM SERPL-SCNC: 141 MMOL/L — SIGNIFICANT CHANGE UP (ref 135–145)
T3 SERPL-MCNC: 53 NG/DL — LOW (ref 80–200)
T4 AB SER-ACNC: 6.8 UG/DL — SIGNIFICANT CHANGE UP (ref 4.5–12)
TROPONIN T SERPL-MCNC: <0.01 NG/ML — SIGNIFICANT CHANGE UP (ref 0–0.06)
TSH SERPL-MCNC: 9.05 UIU/ML — HIGH (ref 0.27–4.2)
WBC # BLD: 10.35 K/UL — SIGNIFICANT CHANGE UP (ref 3.8–10.5)
WBC # FLD AUTO: 10.35 K/UL — SIGNIFICANT CHANGE UP (ref 3.8–10.5)

## 2023-01-19 PROCEDURE — 84436 ASSAY OF TOTAL THYROXINE: CPT

## 2023-01-19 PROCEDURE — 93000 ELECTROCARDIOGRAM COMPLETE: CPT

## 2023-01-19 PROCEDURE — 99285 EMERGENCY DEPT VISIT HI MDM: CPT

## 2023-01-19 PROCEDURE — 99213 OFFICE O/P EST LOW 20 MIN: CPT | Mod: 25

## 2023-01-19 PROCEDURE — 85025 COMPLETE CBC W/AUTO DIFF WBC: CPT

## 2023-01-19 PROCEDURE — 84484 ASSAY OF TROPONIN QUANT: CPT

## 2023-01-19 PROCEDURE — 93005 ELECTROCARDIOGRAM TRACING: CPT

## 2023-01-19 PROCEDURE — 84480 ASSAY TRIIODOTHYRONINE (T3): CPT

## 2023-01-19 PROCEDURE — 84443 ASSAY THYROID STIM HORMONE: CPT

## 2023-01-19 PROCEDURE — 83880 ASSAY OF NATRIURETIC PEPTIDE: CPT

## 2023-01-19 PROCEDURE — 71045 X-RAY EXAM CHEST 1 VIEW: CPT

## 2023-01-19 PROCEDURE — 36415 COLL VENOUS BLD VENIPUNCTURE: CPT

## 2023-01-19 PROCEDURE — 84702 CHORIONIC GONADOTROPIN TEST: CPT

## 2023-01-19 PROCEDURE — 83036 HEMOGLOBIN GLYCOSYLATED A1C: CPT | Mod: QW

## 2023-01-19 PROCEDURE — 71045 X-RAY EXAM CHEST 1 VIEW: CPT | Mod: 26

## 2023-01-19 PROCEDURE — 80053 COMPREHEN METABOLIC PANEL: CPT

## 2023-01-19 NOTE — ED PROVIDER NOTE - NS ED ROS FT
Gen: No fever, (+) increased tiredness  ENT: No congestion, no rhinorrhea  Resp: No cough, no trouble breathing  Cardiovascular: No chest pain, (+)palpitation  Gastrointestinal: (+)nausea, no vomiting, (+)diarrhea  :  No change in urine output; no dysuria, no hematuria  MS: No joint or muscle pain  Skin: No rashes  Neuro: No headache; (+) head and b/l arm tingling  Remainder negative, except as per the HPI

## 2023-01-19 NOTE — ED ADULT NURSE NOTE - CHIEF COMPLAINT QUOTE
Pt with PMHx mitral valve prolapse presents to ED c/o palpitations, weakness and nausea. States that this has been going on for a couple day but is worse today. EKG done in triage.

## 2023-01-19 NOTE — ED PROVIDER NOTE - CARE PROVIDERS DIRECT ADDRESSES
[email or web address]

## 2023-01-19 NOTE — ED PROVIDER NOTE - CLINICAL SUMMARY MEDICAL DECISION MAKING FREE TEXT BOX
42 year old female presenting with 1 week of worsening tiredness, chest heaviness/fluttering, diarrhea. Occasional PVCs noted on monitor. Previous presentation to ED for similar symptoms resulting in adjustment of her synthroid dosage, no f/u since then. Will obtain labs, chest x-ray, reassess.

## 2023-01-19 NOTE — ED PROVIDER NOTE - PATIENT PORTAL LINK FT
You can access the FollowMyHealth Patient Portal offered by [facility] by registering at the following website: [email or web address]. By joining [employer]’s FollowMyHealth portal, you will also be able to view your health information using other applications (apps) compatible with our system.

## 2023-01-19 NOTE — ED PROVIDER NOTE - CARE PROVIDER_API CALL
Nick Clemens)  Cardiovascular Disease; Internal Medicine  [address] [place] [address]  Phone: [phone]  Fax: [phone]  Follow Up Time:

## 2023-01-19 NOTE — ED PROVIDER NOTE - NSFOLLOWUPINSTRUCTIONS_ED_ALL_ED_FT
Follow up with your medical doctor/Endocrinologist regarding increasing your dose of Synthroid  follow up with Cardiology for furhte revaluation of your palpitations/PVCs  Return sooner for any problems      Palpitations    A palpitation is the feeling that your heartbeat is irregular or is faster than normal. It may feel like your heart is fluttering or skipping a beat. They may be caused by many things, including smoking, caffeine, alcohol, stress, and certain medicines. Although most causes of palpitations are not serious, palpitations can be a sign of a serious medical problem. Avoid caffeine, alcohol, and tobacco products at home. Try to reduce stress and anxiety and make sure to get plenty of rest.     SEEK IMMEDIATE MEDICAL CARE IF YOU HAVE ANY OF THE FOLLOWING SYMPTOMS: chest pain, shortness of breath, severe headache, dizziness/lightheadedness, or fainting.

## 2023-01-19 NOTE — ED PROVIDER NOTE - ATTENDING CONTRIBUTION TO CARE
42 y F s/p thyroidectomy for hyperthyroidism currently on Synthroid presents to ED c/o palpitations with assoc chest heaviness.  Pt seen by [facility] in the past for same c/o.  On exam pt awake and alert in NAD, Pt with noted frequent PVC's on cardiac monitor, PERRL, throat clear mm moist, Neck supple, cor Reg with occ ectopy, Lungs clear b/l, Abd soft, NT, Ext FROM, Neuro non-focal.  Labs as noted.  Pt to f/u with PMD and Endocrine as outpt to increase dose of Synthroid and will refer back to Cardiology consider low dose B-blocker for palpitations

## 2023-01-19 NOTE — HISTORY OF PRESENT ILLNESS
[FreeTextEntry8] : Marimar is a 43 yo F w PMHx graves disease s/p thyroidectomy now hypothyroid, current smoker, mitral valve prolapse, IBS\par For the past week has been feeling palpitations went to hospital TSH was 9, she had a follow up to repeat TFTs soon but she was not feeling well. \par GI- on prantoprazole and was started on linzess, has follow up in three months

## 2023-01-19 NOTE — ED PROVIDER NOTE - PHYSICAL EXAMINATION
Gen: well appearing, no acute distress  Head: normocephalic, atraumatic  EENT: EOMI, PERRLA, b/l eyes appear proptotic, moist mucous membranes, no scleral icterus, no JVD  Lung: no increased work of breathing, clear to auscultation bilaterally, no wheezing, rales, rhonchi, speaking in full sentences  CV: regular rate, regular rhythm, occasional PVCs noted on monitor, 2+ radial pulses bilaterally  Abd: soft, non-tender, non-distended, no rebound tenderness or guarding; no CVA tenderness  MSK: No edema, no visible deformities, full range of motion in all 4 extremities  Neuro: Awake, alert, no focal neurologic deficits  Skin: No obvious rash, no jaundice, not diaphoretic  Psych: normal affect, normal speech

## 2023-01-19 NOTE — ED ADULT NURSE NOTE - CAS DISCH BELONGINGS RETURNED
4-30-21 The patient is a 35-year-old female who is referred by Rodrigo Landeros for evaluation and treatment of abdominal pain with constipation.  A copy of this consultation will be sent to the referring physician.  The patient states that 1 week ago, she developed acute, abdominal pain with constipation.  She had not experienced these symptoms in the past.  She started taking a stool softener, and she did finally have a bowel movement.  She now feels that her bowels are not regular.  She did have some rectal bleeding as well.  She does feel that this was from straining.  She does try to drink plenty of water, but she does admit she was likely not drinking enough.  She does not do as well with her exercise.  She has never had a colonoscopy in the past.  She did go to the urgent care center, and they gave her colon prep to drink pop.  She did not want to drink this prep.  They did not do any blood work.  She has not seen her primary care physician in a while.  She has never seen a gastroenterologist.  She has not had blood work done recently.  She also states that she was feeling nauseated and bloated.  This got somewhat better after she had a bowel movement.  She denies any family history of GI problems.  She did buy a probiotic, but she has not started taking it yet.  She does not think there is any particular foods that exacerbate her pain.  She is not taking any other medicines other than birth control pills.  Overall, she is willing proceed with colonoscopy today.  We have also discussed doing her blood work and starting her on a Pepcid.  She is in agreement with this plan.  Not applicable

## 2023-01-19 NOTE — ASSESSMENT
[FreeTextEntry1] : Marimar is a 41 yo F w PMHx graves disease s/p thyroidectomy now hypothyroid, current smoker, mitral valve prolapse, IBS\par For the past week has been feeling palpitations went to hospital TSH was 9, she had a follow up to repeat TFTs soon but she was not feeling well. \par GI- on pantoprazole and was started on linzess, has follow up in three months \par \par EKG unchanged from prior \par TSH 9- increase levothyroxine to 75 mcg, has pending endo appt \par IBS- provided FODMAP diet to see if symptoms improve\par Pre diabetes- fu a1c \par \par rto 3 mo, sooner if needed

## 2023-01-19 NOTE — ED PROVIDER NOTE - OBJECTIVE STATEMENT
42 year old female with PMHx IBS, hyperthyroidism (s/p thyroidectomy, on synthroid 50mcg), MVP, presenting for evaluation of 1 week of progressively worsening chest heaviness/fluttering, notably worse over last 2-3 days. Also reports feeling generally weak, nauseous, tingling in b/l arms and head, and diarrhea. Denies any chest pain, vision changes, vomiting. Notes recently treated for possible ear infection with prednisolone 1 week ago. 42 year old female with PMHx IBS, hyperthyroidism (s/p thyroidectomy, on synthroid 50mcg), MVP, presenting for evaluation of 1 week of progressively worsening chest heaviness/fluttering, notably worse over last 2-3 days. Also reports feeling generally weak, nauseous, tingling in b/l arms and head, and diarrhea. Denies any chest pain, vision changes, vomiting. Notes recently treated for possible ear infection with prednisolone 1 week ago.     Patient was seen here Sept/Oct 2022 for similar complaints, found to have elevated T4, medication dosage at that time was adjusted. Has not followed up with endocrinology since then, has appointment tomorrow but presented to ED tonight for concern of worsening symptoms. 42 year old female with PMHx IBS (recently started on linzess), hyperthyroidism (s/p thyroidectomy, on synthroid 50mcg), MVP, presenting for evaluation of 1 week of progressively worsening chest heaviness/fluttering, notably worse over last 2-3 days. Also reports feeling generally weak, nauseous, tingling in b/l arms and head, and diarrhea. Denies any chest pain, vision changes, vomiting. Notes recently treated for possible ear infection with prednisolone 1 week ago.     Patient was seen here Sept/Oct 2022 for similar complaints, found to have elevated T4, medication dosage at that time was adjusted. Has not followed up with endocrinology since then, has appointment tomorrow but presented to ED tonight for concern of worsening symptoms.

## 2023-01-19 NOTE — ED ADULT NURSE NOTE - OBJECTIVE STATEMENT
pt reports of chest pain, some shortness of breath, dizziness and palpitations x several days but has gotten worse that made her come to ED. pt has hx of mitral valve prolapse and is seeing a cardiologist. pt also on thyroid medication. pt attached to cardiac monitor and pulse ox. [id number]. breathing even and unlabored.

## 2023-01-19 NOTE — PHYSICAL EXAM
[Normal Sclera/Conjunctiva] : normal sclera/conjunctiva [PERRL] : pupils equal round and reactive to light [EOMI] : extraocular movements intact [Coordination Grossly Intact] : coordination grossly intact [No Focal Deficits] : no focal deficits [Normal Gait] : normal gait [Normal] : affect was normal and insight and judgment were intact [de-identified] : + bulging eyes bilaterally  [de-identified] : Patches of hair loss, negative pull test

## 2023-01-20 ENCOUNTER — NON-APPOINTMENT (OUTPATIENT)
Age: 43
End: 2023-01-20

## 2023-02-06 ENCOUNTER — APPOINTMENT (OUTPATIENT)
Dept: CARDIOLOGY | Facility: CLINIC | Age: 43
End: 2023-02-06
Payer: COMMERCIAL

## 2023-02-06 ENCOUNTER — NON-APPOINTMENT (OUTPATIENT)
Age: 43
End: 2023-02-06

## 2023-02-06 VITALS
SYSTOLIC BLOOD PRESSURE: 111 MMHG | TEMPERATURE: 98.7 F | OXYGEN SATURATION: 97 % | WEIGHT: 166 LBS | HEIGHT: 63 IN | DIASTOLIC BLOOD PRESSURE: 71 MMHG | HEART RATE: 79 BPM | BODY MASS INDEX: 29.41 KG/M2

## 2023-02-06 DIAGNOSIS — R07.89 OTHER CHEST PAIN: ICD-10-CM

## 2023-02-06 PROCEDURE — 99214 OFFICE O/P EST MOD 30 MIN: CPT | Mod: 25

## 2023-02-06 PROCEDURE — 93000 ELECTROCARDIOGRAM COMPLETE: CPT

## 2023-02-06 RX ORDER — CIPROFLOXACIN AND DEXAMETHASONE 3; 1 MG/ML; MG/ML
0.3-0.1 SUSPENSION/ DROPS AURICULAR (OTIC) TWICE DAILY
Qty: 1 | Refills: 2 | Status: DISCONTINUED | COMMUNITY
Start: 2022-12-02 | End: 2023-02-06

## 2023-02-13 ENCOUNTER — APPOINTMENT (OUTPATIENT)
Dept: CARDIOLOGY | Facility: CLINIC | Age: 43
End: 2023-02-13
Payer: COMMERCIAL

## 2023-02-13 PROCEDURE — 93015 CV STRESS TEST SUPVJ I&R: CPT

## 2023-02-18 ENCOUNTER — NON-APPOINTMENT (OUTPATIENT)
Age: 43
End: 2023-02-18

## 2023-02-27 ENCOUNTER — RX RENEWAL (OUTPATIENT)
Age: 43
End: 2023-02-27

## 2023-03-10 ENCOUNTER — APPOINTMENT (OUTPATIENT)
Dept: ULTRASOUND IMAGING | Facility: CLINIC | Age: 43
End: 2023-03-10
Payer: COMMERCIAL

## 2023-03-10 ENCOUNTER — RESULT REVIEW (OUTPATIENT)
Age: 43
End: 2023-03-10

## 2023-03-10 ENCOUNTER — OUTPATIENT (OUTPATIENT)
Dept: OUTPATIENT SERVICES | Facility: HOSPITAL | Age: 43
LOS: 1 days | End: 2023-03-10
Payer: COMMERCIAL

## 2023-03-10 DIAGNOSIS — Z00.8 ENCOUNTER FOR OTHER GENERAL EXAMINATION: ICD-10-CM

## 2023-03-10 DIAGNOSIS — L04.9 ACUTE LYMPHADENITIS, UNSPECIFIED: ICD-10-CM

## 2023-03-10 PROCEDURE — 76536 US EXAM OF HEAD AND NECK: CPT | Mod: 26

## 2023-03-10 PROCEDURE — 76536 US EXAM OF HEAD AND NECK: CPT

## 2023-03-13 ENCOUNTER — NON-APPOINTMENT (OUTPATIENT)
Age: 43
End: 2023-03-13

## 2023-03-13 DIAGNOSIS — R94.31 ABNORMAL ELECTROCARDIOGRAM [ECG] [EKG]: ICD-10-CM

## 2023-03-14 ENCOUNTER — APPOINTMENT (OUTPATIENT)
Dept: ENDOCRINOLOGY | Facility: CLINIC | Age: 43
End: 2023-03-14

## 2023-03-16 ENCOUNTER — NON-APPOINTMENT (OUTPATIENT)
Age: 43
End: 2023-03-16

## 2023-03-28 ENCOUNTER — NON-APPOINTMENT (OUTPATIENT)
Age: 43
End: 2023-03-28

## 2023-03-28 ENCOUNTER — APPOINTMENT (OUTPATIENT)
Dept: CT IMAGING | Facility: CLINIC | Age: 43
End: 2023-03-28
Payer: COMMERCIAL

## 2023-03-28 ENCOUNTER — OUTPATIENT (OUTPATIENT)
Dept: OUTPATIENT SERVICES | Facility: HOSPITAL | Age: 43
LOS: 1 days | End: 2023-03-28
Payer: COMMERCIAL

## 2023-03-28 DIAGNOSIS — R07.89 OTHER CHEST PAIN: ICD-10-CM

## 2023-03-28 DIAGNOSIS — E89.0 POSTPROCEDURAL HYPOTHYROIDISM: Chronic | ICD-10-CM

## 2023-03-28 PROCEDURE — 75574 CT ANGIO HRT W/3D IMAGE: CPT | Mod: 26

## 2023-03-28 PROCEDURE — 75574 CT ANGIO HRT W/3D IMAGE: CPT

## 2023-03-30 ENCOUNTER — NON-APPOINTMENT (OUTPATIENT)
Age: 43
End: 2023-03-30

## 2023-04-03 ENCOUNTER — APPOINTMENT (OUTPATIENT)
Dept: CARDIOLOGY | Facility: CLINIC | Age: 43
End: 2023-04-03
Payer: COMMERCIAL

## 2023-04-03 PROCEDURE — 93306 TTE W/DOPPLER COMPLETE: CPT

## 2023-04-06 ENCOUNTER — APPOINTMENT (OUTPATIENT)
Dept: INTERNAL MEDICINE | Facility: CLINIC | Age: 43
End: 2023-04-06
Payer: COMMERCIAL

## 2023-04-06 VITALS
DIASTOLIC BLOOD PRESSURE: 70 MMHG | TEMPERATURE: 97.1 F | OXYGEN SATURATION: 99 % | HEART RATE: 75 BPM | BODY MASS INDEX: 29.77 KG/M2 | SYSTOLIC BLOOD PRESSURE: 107 MMHG | RESPIRATION RATE: 15 BRPM | HEIGHT: 63 IN | WEIGHT: 168 LBS

## 2023-04-06 DIAGNOSIS — K58.9 IRRITABLE BOWEL SYNDROME W/OUT DIARRHEA: ICD-10-CM

## 2023-04-06 PROCEDURE — 99214 OFFICE O/P EST MOD 30 MIN: CPT | Mod: 25

## 2023-04-06 NOTE — PHYSICAL EXAM
[Normal Sclera/Conjunctiva] : normal sclera/conjunctiva [PERRL] : pupils equal round and reactive to light [EOMI] : extraocular movements intact [Coordination Grossly Intact] : coordination grossly intact [No Focal Deficits] : no focal deficits [Normal Gait] : normal gait [Normal] : affect was normal and insight and judgment were intact [de-identified] : + bulging eyes bilaterally  [de-identified] : Patches of hair loss, negative pull test

## 2023-04-06 NOTE — ASSESSMENT
[FreeTextEntry1] : Marimar is a 41 yo F w PMHx graves disease s/p thyroidectomy now hypothyroid, current smoker, mitral valve prolapse, IBS\par Acid reflux has improved. \par Being worked up for mitral valve regurgitation \par Normal CTA \par \par Feeling less fatigued, less palpitations, started to exercise and doing well. '\par \par IBS \par Low fodmap diet \par \par GERD\par GERD diet \par Lifestyle modifications discussed:\par - Decreased intake of chocolate, caffeine, alcohol, carbonated beverages, fried foods, spicy foods, and citrus foods including, but not limited to, oranges, tomatoes and grapefruit \par - Elevated head of bed at bedtime\par - Avoid eating 2-3 hours prior to laying down/sleep\par - Consider eating small, frequent meals\par - Weight loss\par Occasional ppi \par \par Hypothyroidism \par Remain on levothyroxine 75 mcg \par Tolerating well \par fu tfts q\par \par MVR \par Upcoming appt with CT \par \par rto 3 mo

## 2023-04-06 NOTE — HISTORY OF PRESENT ILLNESS
[FreeTextEntry1] : fu pre diabetes, fu hypothyroidism  [de-identified] : Marimar is a 41 yo F w PMHx graves disease s/p thyroidectomy now hypothyroid, current smoker, mitral valve prolapse, IBS\par Acid reflux has improved. \par Being worked up for mitral valve regurgitation \par Normal CTA \par \par Feeling less fatigued, less palpitations, started to exercise and doing well.

## 2023-04-07 LAB
25(OH)D3 SERPL-MCNC: 26.5 NG/ML
ALBUMIN SERPL ELPH-MCNC: 4.6 G/DL
ALP BLD-CCNC: 84 U/L
ALT SERPL-CCNC: 40 U/L
ANION GAP SERPL CALC-SCNC: 14 MMOL/L
AST SERPL-CCNC: 36 U/L
BASOPHILS # BLD AUTO: 0.04 K/UL
BASOPHILS NFR BLD AUTO: 0.6 %
BILIRUB SERPL-MCNC: 0.2 MG/DL
BUN SERPL-MCNC: 23 MG/DL
CALCIUM SERPL-MCNC: 9.3 MG/DL
CHLORIDE SERPL-SCNC: 102 MMOL/L
CO2 SERPL-SCNC: 24 MMOL/L
CREAT SERPL-MCNC: 1.01 MG/DL
EGFR: 72 ML/MIN/1.73M2
EOSINOPHIL # BLD AUTO: 0.1 K/UL
EOSINOPHIL NFR BLD AUTO: 1.5 %
GLUCOSE SERPL-MCNC: 88 MG/DL
HCT VFR BLD CALC: 43.7 %
HGB BLD-MCNC: 14.1 G/DL
IMM GRANULOCYTES NFR BLD AUTO: 0.1 %
LYMPHOCYTES # BLD AUTO: 2.47 K/UL
LYMPHOCYTES NFR BLD AUTO: 36.3 %
MAN DIFF?: NORMAL
MCHC RBC-ENTMCNC: 31.5 PG
MCHC RBC-ENTMCNC: 32.3 GM/DL
MCV RBC AUTO: 97.5 FL
MONOCYTES # BLD AUTO: 0.58 K/UL
MONOCYTES NFR BLD AUTO: 8.5 %
NEUTROPHILS # BLD AUTO: 3.6 K/UL
NEUTROPHILS NFR BLD AUTO: 53 %
PLATELET # BLD AUTO: 240 K/UL
POTASSIUM SERPL-SCNC: 4 MMOL/L
PROT SERPL-MCNC: 7.2 G/DL
RBC # BLD: 4.48 M/UL
RBC # FLD: 12.8 %
SODIUM SERPL-SCNC: 140 MMOL/L
T3 SERPL-MCNC: 79 NG/DL
T3FREE SERPL-MCNC: 1.98 PG/ML
T4 FREE SERPL-MCNC: 1.4 NG/DL
T4 SERPL-MCNC: 9.9 UG/DL
TSH SERPL-ACNC: 1.6 UIU/ML
WBC # FLD AUTO: 6.8 K/UL

## 2023-04-08 LAB
ESTIMATED AVERAGE GLUCOSE: 111 MG/DL
HBA1C MFR BLD HPLC: 5.5 %

## 2023-04-11 ENCOUNTER — APPOINTMENT (OUTPATIENT)
Dept: CARDIOTHORACIC SURGERY | Facility: CLINIC | Age: 43
End: 2023-04-11
Payer: COMMERCIAL

## 2023-04-11 PROCEDURE — 99447 NTRPROF PH1/NTRNET/EHR 11-20: CPT

## 2023-04-11 NOTE — DATA REVIEWED
[FreeTextEntry1] :   4/3/2023 4:15:32 PM\par Sonographer:         Rama Moreno RDCS, RVT\par Referring Physician: Josue Guzman NP\par Copy report sent to: [phone] Stuart Sheppard MD, RPVI\par \par Procedure:     2D Echo/Doppler/Color Doppler Complete.\par Indications:   I34.1 - Nonrheumatic mitral (valve) prolapsed; I34.0 - Mitral\par regurgitation\par Diagnosis:     I34.1 - Nonrheumatic mitral (valve) prolapsed; I34.0 - Mitral\par regurgitation\par Study Details: Technically good study.\par \par \par \par 2D AND M-MODE MEASUREMENTS (normal ranges within parentheses):\par Left                 Normal   Aorta/Left      Ao    Normal\par Ventricle:                    Atrium:        Root\par IVSd (2D):    1.06  (0.7-1.1)                diam\par cm             Aortic Root    3.00  (2.4-3.7)\par LVPWd (2D):   1.03  (0.7-1.1) (Mmode):        cm\par cm             AoV Cusp       2.20  (1.5-2.6)\par LVIDd (2D):   5.07  (3.4-5.7) Separation:     cm\par cm             Left Atrium    3.50  (1.9-4.0)\par LVIDs (2D):   3.55            (2D):           cm\par cm             LA Volume      37.2\par LV FS (2D):   30.0   (>25%)   Index         ml/m?\par %             Right Ventricle:\par Relative Wall 0.41   (<0.42)  TAPSE:           2.26 cm\par Thickness\par \par LV SYSTOLIC FUNCTION BY 2D PLANIMETRY (MOD):\par EF-Biplane: 55.4 %\par \par LV DIASTOLIC FUNCTION:\par MV Peak E: 0.79 m/s e', MV Heena: 0.11 m/s\par MV Peak A: 0.59 m/s E/e' Ratio: 7.00\par E/A Ratio: 1.33     Decel Time: 183 msec\par \par SPECTRAL DOPPLER ANALYSIS (where applicable):\par Mitral Valve:\par MV P1/2 Time: 53.07 msec\par MV Area, PHT: 4.15 cm?\par \par Aortic Valve: AoV Max Mariano: 1.14 m/s AoV Peak P.2 mmHg AoV Mean PG: 3.0 mmHg\par \par LVOT Vmax: 0.84 m/s LVOT VTI: 0.187 m LVOT Diameter: 2.10 cm\par \par AoV Area, Vmax: 2.54 cm? AoV Area, VTI: 2.61 cm? AoV Area, Vmn: 2.53 cm?\par Ao VTI: 0.248\par Tricuspid Valve and PA/RV Systolic Pressure: TR Max Velocity: 2.51 m/s RA Pressure: 3 mmHg RVSP/PASP: 28.2 mmHg\par \par \par PHYSICIAN INTERPRETATION:\par Left Ventricle: The left ventricular internal cavity size is normal. Left ventricular wall thickness is normal.\par Global LV systolic function was normal. Left ventricular ejection fraction, by visual estimation, is 55 to 60%. Spectral Doppler shows normal pattern of LV diastolic filling. Normal wall motion. LV EF by Biplane MOD = 55%.\par Right Ventricle: Normal right ventricular size and function. TV S' 0.1 m/s.\par Left Atrium: The left atrium is normal in size.\par Right Atrium: The right atrium is normal in size.\par Pericardium: There is no evidence of pericardial effusion.\par Mitral Valve: Mild to moderate mitral valve regurgitation is seen. The MR jet is eccentric posteriorly directed.\par Tricuspid Valve: Structurally normal tricuspid valve, with normal leaflet excursion. Mild tricuspid regurgitation is visualized.\par Aortic Valve: Normal trileaflet aortic valve with normal opening. Peak transaortic gradient equals 5.2 mmHg, mean transaortic gradient equals 3.0 mmHg, the calculated aortic valve area equals 2.61 cm? by the continuity equation consistent with normally opening aortic valve. No evidence of aortic valve regurgitation is seen.\par Pulmonic Valve: Structurally normal pulmonic valve, with normal leaflet excursion. No indication of pulmonic valve regurgitation.\par Aorta: The aortic root is normal in size and structure.\par Pulmonary Artery: The main pulmonary artery is normal in size.\par Venous: A normal flow pattern is recorded from the right lower pulmonary vein. The inferior vena cava was normal sized, with respiratory size variation greater than 50%. The inferior vena cava and the hepatic vein show a normal flow pattern.\par \par \par Summary:\par 1. The left atrium is normal in size.\par 2. Normal wall motion. LV EF by Biplane MOD = 55%.\par 3. The right atrium is normal in size.\par 4. Normal right ventricular size and function.\par 5. Mild to moderate mitral valve regurgitation.\par 6. There is no evidence of pericardial effusion.\par \par [id number] Dina Perry MD, RPVI, Electronically signed on 4/10/2023 at 5:17:45 PM\par \par \par \par \par Stress testing from Cardiology on 23\par Normal\par \par \par \par \par \par \par CTA from 23 at [facility] \par Calcium score 0\par \par \par \par \par \par Transesophageal Echocardiogram from 2021 at [facility] \par - LVEF 55-60%\par - Moderate to severe mitral valve regurgitation, Prolapse of the anterior leaflet including the A2 and A3 scallops. \par - Structurally normal tricuspid valve with mild tricuspid regurgitation. \par \par

## 2023-04-11 NOTE — REASON FOR VISIT
[Follow-Up: _____] : a [unfilled] follow-up visit [Home] : at home, [unfilled] , at the time of the visit. [Medical Office: ([facility])___] : at the medical office located in  [Verbal consent obtained from patient] : the patient, [unfilled]

## 2023-04-11 NOTE — HISTORY OF PRESENT ILLNESS
[FreeTextEntry1] : Ms. CERRATO is a 42 year old female referred by Dr. Sheppard who presents for consultation. Her past medical history includes Graves Disease (thyroidectomy), IBS, anxiety, dental infection and abscess history, current smoker and tricuspid as well as mitral valve disease. 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 She is having panic and anxiety anxiety attacks. Her thyroid levels have been fluctuation. \par \par Today she presents back to discuss a 6 month follow up echocardiogram to assess her mitral valve regurgitation. At her prior visit in October she was asymptomatic and was being worked up for her thyroid disease. She states that she is feeling improvement in her symptoms since having her thyroid addressed. She no longer has palpitations, and has minimal anxiety shortness of breath symptoms.

## 2023-04-11 NOTE — CONSULT LETTER
[Dear  ___] : Dear  [unfilled], [Courtesy Letter:] : I had the pleasure of seeing your patient, [unfilled], in my office today. [Please see my note below.] : Please see my note below. [Consult Closing:] : Thank you very much for allowing me to participate in the care of this patient.  If you have any questions, please do not hesitate to contact me. [Sincerely,] : Sincerely, [FreeTextEntry2] : Stuart Sheppard MD [FreeTextEntry3] : Rodolfo Monique MD\par  of Cardiothoracic Surgery\par [facility]\par [address] [facility] \[place] [address]\par [phone]\par

## 2023-04-11 NOTE — ASSESSMENT
[FreeTextEntry1] : Ms Young presents to the office today vis telephone to discuss her recent imaging. She reports an improvement in her symptoms overall since having her thyroid evaluated. Echocardiogram imaging was independently reviewed. There is a mild to moderate degree of mitral valve regurgitation. We discussed that she should refrain from adding salt to her diet and to continue hypertension management. I am recommending that she continue care with Cardiology and return to care in 1 year with echocardiogram imaging, sooner if increase in symptoms.\par \par PLAN:\par - Transthoracic Echocardiogram in 1 year\par - Return to care after imaging\par \par ** Today's visit was conducted via telephone as technical difficulties were encountered with Telehealth\par ** Total time spent on this encounter was 15 minutes\par \par \par \par \par \par \par \par \par \par I, Dr. Monique, personally performed the evaluation and management (E/M) services for this patient.  That E/M includes conducting the initial examination, assessing all conditions, and establishing the plan of care.  Today, my ACP, Devan Ng NP was here to observe my evaluation and management services for this patient to be followed going forward.\par \par \par \par \par \par

## 2023-04-15 ENCOUNTER — NON-APPOINTMENT (OUTPATIENT)
Age: 43
End: 2023-04-15

## 2023-04-17 ENCOUNTER — NON-APPOINTMENT (OUTPATIENT)
Age: 43
End: 2023-04-17

## 2023-06-01 ENCOUNTER — APPOINTMENT (OUTPATIENT)
Dept: INTERNAL MEDICINE | Facility: CLINIC | Age: 43
End: 2023-06-01
Payer: COMMERCIAL

## 2023-06-01 VITALS
WEIGHT: 174 LBS | DIASTOLIC BLOOD PRESSURE: 70 MMHG | TEMPERATURE: 97.7 F | HEIGHT: 63 IN | HEART RATE: 98 BPM | SYSTOLIC BLOOD PRESSURE: 120 MMHG | OXYGEN SATURATION: 98 % | BODY MASS INDEX: 30.83 KG/M2

## 2023-06-01 DIAGNOSIS — L65.9 NONSCARRING HAIR LOSS, UNSPECIFIED: ICD-10-CM

## 2023-06-01 PROCEDURE — 99214 OFFICE O/P EST MOD 30 MIN: CPT | Mod: 25

## 2023-06-01 RX ORDER — FLUTICASONE FUROATE 27.5 UG/1
27.5 SPRAY, METERED NASAL DAILY
Qty: 2 | Refills: 0 | Status: ACTIVE | COMMUNITY
Start: 2023-06-01 | End: 1900-01-01

## 2023-06-01 NOTE — ASSESSMENT
[FreeTextEntry1] : Marimar is a 43 yo F w PMHx graves disease s/p thyroidectomy now hypothyroid, current smoker, mitral valve prolapse, IBS\par For the past month she has been losing a lot of hair, getting heart palpitations again, feeling tired. \par \par Alopecia areata \par Derm referral - intralesional steroid injections \par fu bw \par \par

## 2023-06-01 NOTE — PHYSICAL EXAM
[Normal Sclera/Conjunctiva] : normal sclera/conjunctiva [PERRL] : pupils equal round and reactive to light [EOMI] : extraocular movements intact [Coordination Grossly Intact] : coordination grossly intact [No Focal Deficits] : no focal deficits [Normal Gait] : normal gait [Normal] : affect was normal and insight and judgment were intact [de-identified] : Patches of hair loss, negative pull test  [de-identified] : + bulging eyes bilaterally

## 2023-06-01 NOTE — REVIEW OF SYSTEMS
[Fatigue] : fatigue [Hair Changes] : hair changes [Negative] : Heme/Lymph [de-identified] : hair loss

## 2023-06-01 NOTE — HISTORY OF PRESENT ILLNESS
[de-identified] : Marimar is a 43 yo F w PMHx graves disease s/p thyroidectomy now hypothyroid, current smoker, mitral valve prolapse, IBS\par For the past month she has been losing a lot of hair, getting heart palpitations again, feeling tired. \par  [FreeTextEntry1] : hair loss

## 2023-06-02 ENCOUNTER — LABORATORY RESULT (OUTPATIENT)
Age: 43
End: 2023-06-02

## 2023-06-06 LAB
ALBUMIN SERPL ELPH-MCNC: 4.6 G/DL
ALP BLD-CCNC: 71 U/L
ALT SERPL-CCNC: 25 U/L
ANION GAP SERPL CALC-SCNC: 11 MMOL/L
AST SERPL-CCNC: 27 U/L
BILIRUB SERPL-MCNC: 0.2 MG/DL
BUN SERPL-MCNC: 14 MG/DL
CALCIUM SERPL-MCNC: 9.2 MG/DL
CHLORIDE SERPL-SCNC: 106 MMOL/L
CO2 SERPL-SCNC: 25 MMOL/L
CREAT SERPL-MCNC: 0.93 MG/DL
EGFR: 80 ML/MIN/1.73M2
GLUCOSE SERPL-MCNC: 89 MG/DL
POTASSIUM SERPL-SCNC: 4.2 MMOL/L
PROT SERPL-MCNC: 7 G/DL
SODIUM SERPL-SCNC: 142 MMOL/L
T3 SERPL-MCNC: 68 NG/DL
T3FREE SERPL-MCNC: 1.83 PG/ML
T4 FREE SERPL-MCNC: 1.4 NG/DL
T4 SERPL-MCNC: 9.5 UG/DL
TSH SERPL-ACNC: 2.18 UIU/ML

## 2023-06-07 ENCOUNTER — NON-APPOINTMENT (OUTPATIENT)
Age: 43
End: 2023-06-07

## 2023-06-17 ENCOUNTER — EMERGENCY (EMERGENCY)
Facility: HOSPITAL | Age: 43
LOS: 1 days | Discharge: HOME | End: 2023-06-17
Attending: EMERGENCY MEDICINE
Payer: COMMERCIAL

## 2023-06-17 VITALS
DIASTOLIC BLOOD PRESSURE: 76 MMHG | HEART RATE: 76 BPM | OXYGEN SATURATION: 99 % | SYSTOLIC BLOOD PRESSURE: 117 MMHG | WEIGHT: 172.62 LBS | TEMPERATURE: 98 F | RESPIRATION RATE: 18 BRPM | HEIGHT: 64 IN

## 2023-06-17 DIAGNOSIS — E89.0 POSTPROCEDURAL HYPOTHYROIDISM: Chronic | ICD-10-CM

## 2023-06-17 PROCEDURE — 99282 EMERGENCY DEPT VISIT SF MDM: CPT

## 2023-06-17 PROCEDURE — 99283 EMERGENCY DEPT VISIT LOW MDM: CPT

## 2023-06-17 NOTE — ED PROVIDER NOTE - PATIENT PORTAL LINK FT
You can access the FollowMyHealth Patient Portal offered by [facility] by registering at the following website: [email or web address]. By joining [employer]’s FollowMyHealth portal, you will also be able to view your health information using other applications (apps) compatible with our system.

## 2023-06-17 NOTE — ED ADULT TRIAGE NOTE - CHIEF COMPLAINT QUOTE
RLQ pain x 3 days. States she has what looks like white sesame seeds in stool. Denies vomiting, Diarrhea, fevers.

## 2023-06-17 NOTE — ED PROVIDER NOTE - TEST CONSIDERED BUT NOT PERFORMED
Tests Considered But Not Performed ct abd pelvis however vitals stable abd soft nt nd do not suspect acute appendicits or cholecystitis clincally no urinary symptoms no signs or symptoms of ectopic pregnancy vitals nml

## 2023-06-17 NOTE — ED PROVIDER NOTE - CLINICAL SUMMARY MEDICAL DECISION MAKING FREE TEXT BOX
Abdomen soft nontender nondistended advised patient on a brat diet return to ED for fevers intractable pain inability to tolerate p.o. fluids patient agrees to plan of care

## 2023-06-17 NOTE — ED PROVIDER NOTE - OBJECTIVE STATEMENT
Patient presents to ED with 2 days of abdominal cramping became concerned tonight when she had what looked like sesame seeds in her stool denies melena or hematochezia no hematemesis.  Has a history of IBS no abdominal surgeries.  No fever.  No chest pain or shortness of breath.  No vaginal bleeding or vaginal discharge.  No urinary frequency urgency or dysuria.  Last menstrual period 1 week ago.  No other acute issues symptoms or concerns.  No recent high risk travel.

## 2023-06-17 NOTE — ED PROVIDER NOTE - CROS ED ALLERGIC IMMUN ALL NEG
Pediatric Well Child Exam: 7- 8 Years of age    Chief Complaint   Patient presents with   • Well Child       SUBJECTIVE:  Kimberly Figueroa is a 9 year old female who presents for a  well child exam.  Patient presents  with Mother.      CONCERNS RAISED TODAY:     We are establishing care today. Patient's mother reports having anxiety and depression, as well as her maternal grandparents. Denies any surgical history. Patient's mother denies any concerns for me today.    DIET/GI:  Appetite: Good.  Milk: 2 Percent, 2-3 cups/day.  Food:   · Eats fruits/vegetables: [x]  YES     []  NO   · Eats meat: [x]  YES     []  NO   Problems with bowel movements: []  YES     [x]  NO     PHYSICAL ACTIVITY        Playtime (60 min/day): [x]  YES     []  NO         Electronic Screen time 2-3 hours/day:     SLEEP PATTERN:   8-9 hours/night.    SCHOOL HISTORY:  Facility Type: Attending Public School - name: [employer].  Grade in school: Second Grade    VISION SCREENING:   No exam data present    DEVELOPMENT:  [x]  YES    []  NO      []  UNKNOWN    Has success in school?  [x]  YES    []  NO      []  UNKNOWN    Has friends/does well socially?  [x]  YES    []  NO      []  UNKNOWN    Participates in after school/outside          activities?  [x]  YES    []  NO      []  UNKNOWN    Gets along with family?  [x]  YES    []  NO      []  UNKNOWN    Does chores when asked?  [x]  YES    []  NO      []  UNKNOWN    Given chances to make own decisions?  [x]  YES    []  NO      []  UNKNOWN    Feels good about self/generally happy?    OBJECTIVE:  PAST HISTORIES:  Allergies, Medications, Medical history, Surgical history, Family history reviewed and updated.  Toxic Exposure:   Tobacco Use: Not Asked       IMMUNIZATION STATUS: Up to date per review of the electronic health records.    IMMUNIZATION REACTIONS: None    RECENT HEALTH EVENTS:  Illnesses: None.  Hospitalizations: None.  Injuries or Accidents: None.    REVIEW OF SYSTEMS:    All  systems reviewed and negative except as documented in \"Concerns raised\".    PHYSICAL EXAM:      VITAL SIGNS:   Visit Vitals  BP (!) 82/60   Pulse 88   Resp 20   Ht 4' 4.5\" (1.334 m)   Wt 29.3 kg   BMI 16.45 kg/m²    77 %ile (Z= 0.73) based on [place] (Girls, 2-20 Years) weight-for-age data using vitals from 4/13/2021.  GENERAL:  Well appearing female child.  Alert and active.  SKIN:  Warm, normal turgor.  No cyanosis.  No rash.  HEAD:  Normocephalic, atraumatic.    EYES:  Conjunctivae appear normal, noninjected, nonicteric, positive red reflex.  NOSE:  Appears normal without drainage.  EARS:  Normal external auditory canals. Tympanic membranes are transparent with normal landmarks.  THROAT:  Moist mucous membranes without lesions.  NECK:  Supple, no lymphadenopathy or masses.  HEART:  Regular rate and rhythm.  Normal S1, S2.  No murmurs, rubs, gallops.   LUNGS:  Clear to auscultation.  No wheezes, rales, rhonchi.  Normal work effort with breathing.  ABDOMEN:  Bowel sounds present. Soft, nontender.  No hepatomegaly.  No splenomegaly.  No masses.  GENITOURINARY: Alexander stage 1. Normal female genitalia.  Rectum/anus patent.  EXTREMITIES: Symmetrical muscle mass, strength all extremities.  No effusions.   BACK: Spine straight.  No costovertebral angle tenderness.      NEUROLOGIC:  Oriented x4. No gross lateralizing signs or focal deficits.  Normal gait. Normal deep tendon reflexes.       Assessment:  9 year old female well child.    Plan:  1. All parental concerns and questions discussed.  2. Anticipatory guidance provided, handout/s given Safety: Car, bicycle, fire, sharp objects, falls, water  3. Development  4. Diet  5. Discipline  6. Television  7. Sun exposure  8. Dental care      Follow up: Return in about 1 year (around 4/13/2022) for Well Child Visit.     On 4/13/2021, Pam BEE scribed the services personally performed by Mirela Fairbanks MD     negative...

## 2023-06-20 ENCOUNTER — APPOINTMENT (OUTPATIENT)
Dept: INTERNAL MEDICINE | Facility: CLINIC | Age: 43
End: 2023-06-20
Payer: COMMERCIAL

## 2023-06-20 ENCOUNTER — LABORATORY RESULT (OUTPATIENT)
Age: 43
End: 2023-06-20

## 2023-06-20 ENCOUNTER — NON-APPOINTMENT (OUTPATIENT)
Age: 43
End: 2023-06-20

## 2023-06-20 VITALS
HEIGHT: 63 IN | BODY MASS INDEX: 30.3 KG/M2 | WEIGHT: 171 LBS | TEMPERATURE: 96.8 F | SYSTOLIC BLOOD PRESSURE: 90 MMHG | HEART RATE: 80 BPM | DIASTOLIC BLOOD PRESSURE: 70 MMHG

## 2023-06-20 DIAGNOSIS — K21.9 GASTRO-ESOPHAGEAL REFLUX DISEASE W/OUT ESOPHAGITIS: ICD-10-CM

## 2023-06-20 DIAGNOSIS — K59.09 OTHER CONSTIPATION: ICD-10-CM

## 2023-06-20 DIAGNOSIS — L63.9 ALOPECIA AREATA, UNSPECIFIED: ICD-10-CM

## 2023-06-20 DIAGNOSIS — Z02.1 ENCOUNTER FOR PRE-EMPLOYMENT EXAMINATION: ICD-10-CM

## 2023-06-20 DIAGNOSIS — Z00.00 ENCOUNTER FOR GENERAL ADULT MEDICAL EXAMINATION W/OUT ABNORMAL FINDINGS: ICD-10-CM

## 2023-06-20 DIAGNOSIS — F17.210 NICOTINE DEPENDENCE, CIGARETTES, UNCOMPLICATED: ICD-10-CM

## 2023-06-20 PROCEDURE — G0444 DEPRESSION SCREEN ANNUAL: CPT | Mod: 59

## 2023-06-20 PROCEDURE — 93000 ELECTROCARDIOGRAM COMPLETE: CPT | Mod: 59

## 2023-06-20 PROCEDURE — 99406 BEHAV CHNG SMOKING 3-10 MIN: CPT

## 2023-06-20 PROCEDURE — G0447 BEHAVIOR COUNSEL OBESITY 15M: CPT

## 2023-06-20 PROCEDURE — 99396 PREV VISIT EST AGE 40-64: CPT | Mod: 25

## 2023-06-20 NOTE — PHYSICAL EXAM
[Normal Sclera/Conjunctiva] : normal sclera/conjunctiva [PERRL] : pupils equal round and reactive to light [EOMI] : extraocular movements intact [No Abdominal Bruit] : a ~M bruit was not heard ~T in the abdomen [No Edema] : there was no peripheral edema [No Palpable Aorta] : no palpable aorta [Coordination Grossly Intact] : coordination grossly intact [No Focal Deficits] : no focal deficits [Normal Gait] : normal gait [Normal] : affect was normal and insight and judgment were intact [de-identified] : + bulging eyes bilaterally  [de-identified] : Patches of hair loss, negative pull test

## 2023-06-20 NOTE — HISTORY OF PRESENT ILLNESS
[FreeTextEntry1] : CPE [de-identified] : Marimar is a 41 yo F w PMHx graves disease s/p thyroidectomy now hypothyroid, current smoker, mitral valve prolapse, IBS\par Patient started feeling sick about two weeks ago and she decreased her levothyroxine to 25 mcg until finally she did not take thyroid medication yesterday. Today feels fatigued, sluggish. \par Felt Palpitations, pins and needles, L arm starts hurting, headaches two weeks ago - she had abnormal stress test but due to resting EKG changes the positive predictive value is decreased \par LMP- irregular for the past years last 6/6/2023 \par \par HCM \par Needs tdap, will defer due to feeling sick \par Has appt with GI within the next couple of weeks, Has been having seedy stools with streaks of blood. \par Mammo- due \par Pap smear 2022 \par Refuses flu vaccine \par

## 2023-06-20 NOTE — COUNSELING
[Behavioral health counseling provided] : Behavioral health counseling provided [Sleep ___ hours/day] : Sleep [unfilled] hours/day [Engage in a relaxing activity] : Engage in a relaxing activity [Plan in advance] : Plan in advance [Cessation strategies including cessation program discussed] : Cessation strategies including cessation program discussed [Use of nicotine replacement therapies and other medications discussed] : Use of nicotine replacement therapies and other medications discussed [Encouraged to pick a quit date and identify support needed to quit] : Encouraged to pick a quit date and identify support needed to quit [Yes] : Willing to quit smoking [FreeTextEntry1] : 10 [Potential consequences of obesity discussed] : Potential consequences of obesity discussed [Benefits of weight loss discussed] : Benefits of weight loss discussed [Encouraged to maintain food diary] : Encouraged to maintain food diary [Encouraged to increase physical activity] : Encouraged to increase physical activity [Encouraged to use exercise tracking device] : Encouraged to use exercise tracking device [Weigh Self Weekly] : weigh self weekly [Decrease Portions] : decrease portions [____ min/wk Activity] : [unfilled] min/wk activity [Keep Food Diary] : keep food diary [FreeTextEntry2] :  Explained amount of protein fat and carbs that are allowed on eat meal. Needs also to follow a caloric deficit. He will start using lose it stephy to log food and track calories, carbs and fats. Good sources of good fats such as avocado, nuts, fish, good source of protein eggs, white part of chicken, beef from time to time. Good source of carbs low carb wraps, green veggies etc... also needs to start exercising. Recommended around 45 min 4-5 times a week of moderate intensity work outs. Patient understood and agreed with plan.\par  [FreeTextEntry4] : 15

## 2023-06-20 NOTE — HEALTH RISK ASSESSMENT
[Yes] : Yes [Monthly or less (1 pt)] : Monthly or less (1 point) [1 or 2 (0 pts)] : 1 or 2 (0 points) [Never (0 pts)] : Never (0 points) [No] : In the past 12 months have you used drugs other than those required for medical reasons? No [No falls in past year] : Patient reported no falls in the past year [0] : 1) Little interest or pleasure doing things: Not at all (0) [1] : 2) Feeling down, depressed, or hopeless for several days (1) [PHQ-2 Negative - No further assessment needed] : PHQ-2 Negative - No further assessment needed [Patient reported PAP Smear was normal] : Patient reported PAP Smear was normal [HIV test declined] : HIV test declined [Hepatitis C test declined] : Hepatitis C test declined [None] : None [# of Members in Household ___] :  household currently consist of [unfilled] member(s) [Employed] : employed [Single] : single [Sexually Active] : sexually active [Feels Safe at Home] : Feels safe at home [Fully functional (bathing, dressing, toileting, transferring, walking, feeding)] : Fully functional (bathing, dressing, toileting, transferring, walking, feeding) [Fully functional (using the telephone, shopping, preparing meals, housekeeping, doing laundry, using] : Fully functional and needs no help or supervision to perform IADLs (using the telephone, shopping, preparing meals, housekeeping, doing laundry, using transportation, managing medications and managing finances) [Reports changes in vision] : Reports changes in vision [Reports normal functional visual acuity (ie: able to read med bottle)] : Reports normal functional visual acuity [With Patient/Caregiver] : , with patient/caregiver [Reviewed updated] : Reviewed, updated [Designated Healthcare Proxy] : Designated healthcare proxy [Current] : Current [10-14] : 10-14 [Audit-CScore] : 1 [de-identified] : no - body pain  [de-identified] : trying to eat healthy  [[id number]] : 1 [Change in mental status noted] : No change in mental status noted [Language] : denies difficulty with language [Behavior] : denies difficulty with behavior [Learning/Retaining New Information] : denies difficulty learning/retaining new information [Handling Complex Tasks] : denies difficulty handling complex tasks [Reasoning] : denies difficulty with reasoning [Spatial Ability and Orientation] : denies difficulty with spatial ability and orientation [High Risk Behavior] : no high risk behavior [Reports changes in hearing] : Reports no changes in hearing [MammogramComments] : due  [PapSmearDate] : 7/2022 [AdvancecareDate] : 6/20/2023

## 2023-06-20 NOTE — ASSESSMENT
[FreeTextEntry1] : Marimar is a 43 yo F w PMHx graves disease s/p thyroidectomy now hypothyroid, current smoker, mitral valve prolapse, IBS\par Patient started feeling sick about two weeks ago and she decreased her levothyroxine to 25 mcg until finally she did not take thyroid medication yesterday. Today feels fatigued, sluggish. \par Felt Palpitations, pins and needles, L arm starts hurting, headaches two weeks ago - she had abnormal stress test but due to resting EKG changes the positive predictive value is decreased \par LMP- irregular for the past years last 6/6/2023 \par \par HCM \par Needs tdap, will defer due to feeling sick \par Has appt with GI within the next couple of weeks, Has been having seedy stools with streaks of blood. \par Mammo- due \par Pap smear 2022 \par Refuses flu vaccine \par EKG no significant changes \par Educated about importance of healthy diet, physical acitvity (45 min 5 days a week moderate intensity exercises), proper sleep (8 hours of sleep), importance of screening test for early detection and treatment of cancer. Importance of immunizations including COVID-19 and seasonal flu vaccine in order to prevent or lessen disease. \par \par Hypothyroidism/Palpitations/Atypical chest pain \par SHe discontinue levothyroxine so likely numbers will be off, had pt take a 75 mcg pill in office \par She is to cont current dose and advised her not to stop medication until speaking to me about it \par These symptoms I believe are not related to her thyroid med as she remain euthyroid only 10 days ago. \par Advised to f/u cardio for further eval\par \par GERD/Rectal bleeding \par GERD diet \par Lifestyle modifications discussed:\par - Decreased intake of chocolate, caffeine, alcohol, carbonated beverages, fried foods, spicy foods, and citrus foods including, but not limited to, oranges, tomatoes and grapefruit \par - Elevated head of bed at bedtime\par - Avoid eating 2-3 hours prior to laying down/sleep\par - Consider eating small, frequent meals\par - Weight loss\par fu gi \par \par Head pressure/headaches \par fu ct head \par \par rto 1 mo, sooner if needed \par

## 2023-06-20 NOTE — REVIEW OF SYSTEMS
[Fatigue] : fatigue [Heartburn] : heartburn [Hair Changes] : hair changes [Negative] : Heme/Lymph [de-identified] : hair loss

## 2023-06-22 LAB
25(OH)D3 SERPL-MCNC: 22.6 NG/ML
ALBUMIN SERPL ELPH-MCNC: 4.3 G/DL
ALP BLD-CCNC: 70 U/L
ALT SERPL-CCNC: 14 U/L
ANION GAP SERPL CALC-SCNC: 13 MMOL/L
AST SERPL-CCNC: 22 U/L
BILIRUB SERPL-MCNC: 0.2 MG/DL
BUN SERPL-MCNC: 14 MG/DL
CALCIUM SERPL-MCNC: 9 MG/DL
CHLORIDE SERPL-SCNC: 106 MMOL/L
CHOLEST SERPL-MCNC: 221 MG/DL
CO2 SERPL-SCNC: 24 MMOL/L
CREAT SERPL-MCNC: 0.83 MG/DL
EGFR: 91 ML/MIN/1.73M2
ESTIMATED AVERAGE GLUCOSE: 123 MG/DL
GLUCOSE SERPL-MCNC: 93 MG/DL
HAV IGM SER QL: NONREACTIVE
HBA1C MFR BLD HPLC: 5.9 %
HBV CORE IGM SER QL: NONREACTIVE
HBV SURFACE AG SER QL: NONREACTIVE
HCV AB SER QL: NONREACTIVE
HCV S/CO RATIO: 0.09 S/CO
HDLC SERPL-MCNC: 68 MG/DL
LDLC SERPL CALC-MCNC: 144 MG/DL
M TB IFN-G BLD-IMP: NEGATIVE
MEV IGG FLD QL IA: >300 AU/ML
MEV IGG+IGM SER-IMP: POSITIVE
MUV AB SER-ACNC: POSITIVE
MUV IGG SER QL IA: 58.1 AU/ML
NONHDLC SERPL-MCNC: 154 MG/DL
POTASSIUM SERPL-SCNC: 3.7 MMOL/L
PROT SERPL-MCNC: 6.6 G/DL
QUANTIFERON TB PLUS MITOGEN MINUS NIL: 3.61 IU/ML
QUANTIFERON TB PLUS NIL: 0.02 IU/ML
QUANTIFERON TB PLUS TB1 MINUS NIL: -0.01 IU/ML
QUANTIFERON TB PLUS TB2 MINUS NIL: -0.01 IU/ML
RUBV IGG FLD-ACNC: 5.3 INDEX
RUBV IGG SER-IMP: POSITIVE
SODIUM SERPL-SCNC: 142 MMOL/L
TRIGL SERPL-MCNC: 49 MG/DL
TSH SERPL-ACNC: 5.01 UIU/ML
VZV AB TITR SER: POSITIVE
VZV IGG SER IF-ACNC: 2575 INDEX

## 2023-06-23 ENCOUNTER — NON-APPOINTMENT (OUTPATIENT)
Age: 43
End: 2023-06-23

## 2023-06-23 ENCOUNTER — APPOINTMENT (OUTPATIENT)
Dept: CARDIOLOGY | Facility: CLINIC | Age: 43
End: 2023-06-23
Payer: COMMERCIAL

## 2023-06-23 VITALS
OXYGEN SATURATION: 75 % | HEART RATE: 99 BPM | BODY MASS INDEX: 30.48 KG/M2 | WEIGHT: 172 LBS | DIASTOLIC BLOOD PRESSURE: 75 MMHG | SYSTOLIC BLOOD PRESSURE: 109 MMHG | HEIGHT: 63 IN

## 2023-06-23 DIAGNOSIS — R07.89 OTHER CHEST PAIN: ICD-10-CM

## 2023-06-23 DIAGNOSIS — R00.2 PALPITATIONS: ICD-10-CM

## 2023-06-23 PROCEDURE — 93000 ELECTROCARDIOGRAM COMPLETE: CPT | Mod: 59

## 2023-06-23 PROCEDURE — 93242 EXT ECG>48HR<7D RECORDING: CPT

## 2023-06-23 PROCEDURE — 99214 OFFICE O/P EST MOD 30 MIN: CPT | Mod: 25

## 2023-06-23 RX ORDER — METOPROLOL TARTRATE 25 MG/1
25 TABLET, FILM COATED ORAL
Qty: 2 | Refills: 0 | Status: DISCONTINUED | COMMUNITY
Start: 2023-03-13 | End: 2023-06-23

## 2023-06-23 RX ORDER — CETIRIZINE HYDROCHLORIDE 10 MG/1
10 TABLET, FILM COATED ORAL DAILY
Qty: 30 | Refills: 2 | Status: DISCONTINUED | COMMUNITY
Start: 2020-04-03 | End: 2023-06-23

## 2023-06-23 RX ORDER — LEVOCETIRIZINE DIHYDROCHLORIDE 5 MG/1
5 TABLET ORAL
Qty: 90 | Refills: 0 | Status: DISCONTINUED | COMMUNITY
Start: 2022-12-02 | End: 2023-06-23

## 2023-06-23 RX ORDER — PANTOPRAZOLE 40 MG/1
40 TABLET, DELAYED RELEASE ORAL DAILY
Qty: 90 | Refills: 0 | Status: DISCONTINUED | COMMUNITY
Start: 2022-10-28 | End: 2023-06-23

## 2023-06-23 RX ORDER — POLYETHYLENE GLYCOL 3350 17 G/17G
17 POWDER, FOR SOLUTION ORAL
Qty: 510 | Refills: 0 | Status: ACTIVE | COMMUNITY
Start: 2023-04-30

## 2023-06-30 NOTE — ED PROVIDER NOTE - COVID-19  TEST TYPE
Impression: Pallor: R23.1. Plan: Patient denies history of PDE5 blocker usage. Reports history of cellulitis OD 5 months ago. MOLECULAR PCR

## 2023-07-13 ENCOUNTER — APPOINTMENT (OUTPATIENT)
Dept: INTERNAL MEDICINE | Facility: CLINIC | Age: 43
End: 2023-07-13
Payer: COMMERCIAL

## 2023-07-13 VITALS
TEMPERATURE: 97.2 F | OXYGEN SATURATION: 98 % | HEIGHT: 63 IN | SYSTOLIC BLOOD PRESSURE: 90 MMHG | BODY MASS INDEX: 29.77 KG/M2 | DIASTOLIC BLOOD PRESSURE: 68 MMHG | HEART RATE: 83 BPM | WEIGHT: 168 LBS | RESPIRATION RATE: 15 BRPM

## 2023-07-13 DIAGNOSIS — F41.9 ANXIETY DISORDER, UNSPECIFIED: ICD-10-CM

## 2023-07-13 DIAGNOSIS — H66.90 OTITIS MEDIA, UNSPECIFIED, UNSPECIFIED EAR: ICD-10-CM

## 2023-07-13 DIAGNOSIS — J06.9 ACUTE UPPER RESPIRATORY INFECTION, UNSPECIFIED: ICD-10-CM

## 2023-07-13 PROCEDURE — 99214 OFFICE O/P EST MOD 30 MIN: CPT

## 2023-07-13 NOTE — PHYSICAL EXAM
[Normal Sclera/Conjunctiva] : normal sclera/conjunctiva [PERRL] : pupils equal round and reactive to light [EOMI] : extraocular movements intact [No Abdominal Bruit] : a ~M bruit was not heard ~T in the abdomen [No Edema] : there was no peripheral edema [No Palpable Aorta] : no palpable aorta [Coordination Grossly Intact] : coordination grossly intact [No Focal Deficits] : no focal deficits [Normal Gait] : normal gait [Normal] : affect was normal and insight and judgment were intact [de-identified] : + bulging eyes bilaterally  [de-identified] : + bilateral TM bulging with fluid  [de-identified] : Patches of hair loss, negative pull test

## 2023-07-13 NOTE — HISTORY OF PRESENT ILLNESS
[FreeTextEntry1] : MARIALUISA CAMPOS  [de-identified] : Marimar is a 43 yo F w PMHx graves disease s/p thyroidectomy now hypothyroid, current smoker, mitral valve prolapse, IBS\par Feeling pins and needles on face and arms. Comes and goes. She was sitting down, watching TV and it happened. \par Ear congestion. \par Feels irritable. \par Sometimes gets blurry vision, seeing black specs, she was told borderline glaucoma- has not seen optho in one year \par No fevers, no chills.

## 2023-07-13 NOTE — ASSESSMENT
[FreeTextEntry1] : Marimar is a 43 yo F w PMHx graves disease s/p thyroidectomy now hypothyroid, current smoker, mitral valve prolapse, IBS\par Feeling pins and needles on face and arms. Comes and goes. She was sitting down, watching TV and it happened. \par Ear congestion. \par Feels irritable. \par Sometimes gets blurry vision, seeing black specs, she was told borderline glaucoma- has not seen optho in one year \par No fevers, no chills. \par \par Has ear infection will treat with amox \par \par Depression and anxiety \par Patient has hx anxiety/depression which she has been hesitant to treat in the past. Today she is agreeable to start lexapro 5 mg qd. Educated about possible side effects including thoughts of suicide, if happens he is to discontinue medication immediately and call office or visit nearest ED. Expectations were set regarding efficacy of medication: needs to be used daily and might take more than 6 weeks in order to work. Patient will follow up in 6 weeks in order to assess response to treatment. Therapy is also recommended and resources provided.\par \par Headaches/Fargue/tingling sensation of head \par Fu CT head \par \par HLD \par Educated eating whole grain foods rich in soluble fiber such as oats and Omega 3 rich fish such as salmon, tout, sardines and bean based meals such as kidney beans, chickpeas and lentils. Small protions of nuts. Limit sugars and alcohol. \par \par Fu prolactin (fasting and in am) \par \par RTO 6 WEEKS

## 2023-07-13 NOTE — REVIEW OF SYSTEMS
[Fatigue] : fatigue [Earache] : earache [Heartburn] : heartburn [Hair Changes] : hair changes [Negative] : Heme/Lymph [de-identified] : hair loss

## 2023-07-15 ENCOUNTER — APPOINTMENT (OUTPATIENT)
Dept: MAMMOGRAPHY | Facility: CLINIC | Age: 43
End: 2023-07-15
Payer: COMMERCIAL

## 2023-07-15 ENCOUNTER — APPOINTMENT (OUTPATIENT)
Dept: CT IMAGING | Facility: CLINIC | Age: 43
End: 2023-07-15
Payer: COMMERCIAL

## 2023-07-15 ENCOUNTER — OUTPATIENT (OUTPATIENT)
Dept: OUTPATIENT SERVICES | Facility: HOSPITAL | Age: 43
LOS: 1 days | End: 2023-07-15
Payer: COMMERCIAL

## 2023-07-15 ENCOUNTER — RESULT REVIEW (OUTPATIENT)
Age: 43
End: 2023-07-15

## 2023-07-15 DIAGNOSIS — R51.9 HEADACHE, UNSPECIFIED: ICD-10-CM

## 2023-07-15 DIAGNOSIS — Z00.8 ENCOUNTER FOR OTHER GENERAL EXAMINATION: ICD-10-CM

## 2023-07-15 DIAGNOSIS — E03.9 HYPOTHYROIDISM, UNSPECIFIED: ICD-10-CM

## 2023-07-15 DIAGNOSIS — E89.0 POSTPROCEDURAL HYPOTHYROIDISM: Chronic | ICD-10-CM

## 2023-07-15 PROCEDURE — 77063 BREAST TOMOSYNTHESIS BI: CPT | Mod: 26

## 2023-07-15 PROCEDURE — 77067 SCR MAMMO BI INCL CAD: CPT | Mod: 26

## 2023-07-15 PROCEDURE — 77067 SCR MAMMO BI INCL CAD: CPT

## 2023-07-15 PROCEDURE — 70450 CT HEAD/BRAIN W/O DYE: CPT

## 2023-07-15 PROCEDURE — 77063 BREAST TOMOSYNTHESIS BI: CPT

## 2023-07-15 PROCEDURE — 70450 CT HEAD/BRAIN W/O DYE: CPT | Mod: 26

## 2023-08-12 NOTE — ED ADULT NURSE NOTE - CAS EDN DISCHARGE ASSESSMENT
Patient A&Ox4. On aspen collar with anterior neck dressing mildly soaked with serosanguineous drainage, SY drain intact with serosanguineous fluid drainage . Diet advanced to soft and kept at soft bec patient was c/o throat pain when he swallows, cepacol lozenges given.  Patient c/o 10/10 pain all the time even when was woken up from sleep. Patient does not call desk to request for pain medicine. PRN pain medicine given.Call light within reach.Bed alarm on.    Alert and oriented to person, place and time/Patient baseline mental status/Awake/Symptoms improved

## 2023-09-07 ENCOUNTER — APPOINTMENT (OUTPATIENT)
Dept: INTERNAL MEDICINE | Facility: CLINIC | Age: 43
End: 2023-09-07
Payer: COMMERCIAL

## 2023-09-07 VITALS
DIASTOLIC BLOOD PRESSURE: 80 MMHG | BODY MASS INDEX: 30.48 KG/M2 | TEMPERATURE: 97.3 F | OXYGEN SATURATION: 95 % | WEIGHT: 172 LBS | HEIGHT: 63 IN | SYSTOLIC BLOOD PRESSURE: 118 MMHG | RESPIRATION RATE: 15 BRPM | HEART RATE: 80 BPM

## 2023-09-07 DIAGNOSIS — E55.9 VITAMIN D DEFICIENCY, UNSPECIFIED: ICD-10-CM

## 2023-09-07 DIAGNOSIS — H93.8X9 OTHER SPECIFIED DISORDERS OF EAR, UNSPECIFIED EAR: ICD-10-CM

## 2023-09-07 DIAGNOSIS — B37.9 CANDIDIASIS, UNSPECIFIED: ICD-10-CM

## 2023-09-07 PROCEDURE — 99214 OFFICE O/P EST MOD 30 MIN: CPT

## 2023-09-07 NOTE — PHYSICAL EXAM
[Normal Sclera/Conjunctiva] : normal sclera/conjunctiva [PERRL] : pupils equal round and reactive to light [EOMI] : extraocular movements intact [Normal Outer Ear/Nose] : the outer ears and nose were normal in appearance [Normal Oropharynx] : the oropharynx was normal [No Abdominal Bruit] : a ~M bruit was not heard ~T in the abdomen [No Edema] : there was no peripheral edema [No Palpable Aorta] : no palpable aorta [Coordination Grossly Intact] : coordination grossly intact [No Focal Deficits] : no focal deficits [Normal Gait] : normal gait [Normal] : affect was normal and insight and judgment were intact [de-identified] : + bulging eyes bilaterally  [de-identified] : Patches of hair loss, negative pull test

## 2023-09-07 NOTE — ASSESSMENT
[FreeTextEntry1] : Marimar is a 43 yo F w PMHx graves disease s/p thyroidectomy now hypothyroid, current smoker, mitral valve prolapse, IBS Patient states that she has been feeling much better. She is back to working and that has made her shift her thoughts to other stuff.  Feeling pins and needles on face and arms. Comes and goes. She was sitting down, watching TV and it happened.  Ear congestion. Has not improved with flonase  Has not started lexapro but states she will do so as she understands that many of her symptoms are probably stemming from her anxiety.  c/o yeast infection  Sometimes gets blurry vision, seeing black specs, she was told borderline glaucoma- has not seen optho in one year - still has not seen optho  No fevers, no chills.   Depression and anxiety  Patient has hx anxiety/depression which she has been hesitant to treat in the past. Today she is agreeable to start lexapro 5 mg qd. Educated about possible side effects including thoughts of suicide, if happens he is to discontinue medication immediately and call office or visit nearest ED. Expectations were set regarding efficacy of medication: needs to be used daily and might take more than 6 weeks in order to work. Patient will follow up in 6 weeks in order to assess response to treatment. Therapy is also recommended and resources provided.  Headaches/Fargue/tingling sensation of head  CT head is normal  symptoms have resolved   HLD  Educated eating whole grain foods rich in soluble fiber such as oats and Omega 3 rich fish such as salmon, tout, sardines and bean based meals such as kidney beans, chickpeas and lentils. Small protions of nuts. Limit sugars and alcohol.   RTO 6 WEEKS

## 2023-09-07 NOTE — HISTORY OF PRESENT ILLNESS
[FreeTextEntry1] : fu symptoms  [de-identified] : Marimar is a 41 yo F w PMHx graves disease s/p thyroidectomy now hypothyroid, current smoker, mitral valve prolapse, IBS Patient states that she has been feeling much better. She is back to working and that has made her shift her thoughts to other stuff.  Feeling pins and needles on face and arms. Comes and goes. She was sitting down, watching TV and it happened.  Ear congestion. Has not improved with flonase  Has not started lexapro but states she will do so as she understands that many of her symptoms are probably stemming from her anxiety.  c/o yeast infection  Sometimes gets blurry vision, seeing black specs, she was told borderline glaucoma- has not seen optho in one year - still has not seen optho  No fevers, no chills.

## 2023-09-07 NOTE — ASSESSMENT
[FreeTextEntry1] : Marimar is a 41 yo F w PMHx graves disease s/p thyroidectomy now hypothyroid, current smoker, mitral valve prolapse, IBS Patient states that she has been feeling much better. She is back to working and that has made her shift her thoughts to other stuff.  Feeling pins and needles on face and arms. Comes and goes. She was sitting down, watching TV and it happened.  Ear congestion. Has not improved with flonase  Has not started lexapro but states she will do so as she understands that many of her symptoms are probably stemming from her anxiety.  c/o yeast infection  Sometimes gets blurry vision, seeing black specs, she was told borderline glaucoma- has not seen optho in one year - still has not seen optho  No fevers, no chills.   Depression and anxiety  Patient has hx anxiety/depression which she has been hesitant to treat in the past. Today she is agreeable to start lexapro 5 mg qd. Educated about possible side effects including thoughts of suicide, if happens he is to discontinue medication immediately and call office or visit nearest ED. Expectations were set regarding efficacy of medication: needs to be used daily and might take more than 6 weeks in order to work. Patient will follow up in 6 weeks in order to assess response to treatment. Therapy is also recommended and resources provided.  Headaches/Fargue/tingling sensation of head  CT head is normal  symptoms have resolved   HLD  Educated eating whole grain foods rich in soluble fiber such as oats and Omega 3 rich fish such as salmon, tout, sardines and bean based meals such as kidney beans, chickpeas and lentils. Small protions of nuts. Limit sugars and alcohol.   RTO 6 WEEKS

## 2023-09-07 NOTE — REVIEW OF SYSTEMS
[Fatigue] : no fatigue [Earache] : earache [Heartburn] : no heartburn [Hair Changes] : hair changes [Negative] : Heme/Lymph [de-identified] : hair loss

## 2023-09-07 NOTE — HISTORY OF PRESENT ILLNESS
[FreeTextEntry1] : fu symptoms  [de-identified] : Marimar is a 41 yo F w PMHx graves disease s/p thyroidectomy now hypothyroid, current smoker, mitral valve prolapse, IBS Patient states that she has been feeling much better. She is back to working and that has made her shift her thoughts to other stuff.  Feeling pins and needles on face and arms. Comes and goes. She was sitting down, watching TV and it happened.  Ear congestion. Has not improved with flonase  Has not started lexapro but states she will do so as she understands that many of her symptoms are probably stemming from her anxiety.  c/o yeast infection  Sometimes gets blurry vision, seeing black specs, she was told borderline glaucoma- has not seen optho in one year - still has not seen optho  No fevers, no chills.

## 2023-09-07 NOTE — REVIEW OF SYSTEMS
[Fatigue] : no fatigue [Earache] : earache [Heartburn] : no heartburn [Hair Changes] : hair changes [Negative] : Heme/Lymph [de-identified] : hair loss

## 2023-09-07 NOTE — PHYSICAL EXAM
[Normal Sclera/Conjunctiva] : normal sclera/conjunctiva [PERRL] : pupils equal round and reactive to light [EOMI] : extraocular movements intact [Normal Outer Ear/Nose] : the outer ears and nose were normal in appearance [Normal Oropharynx] : the oropharynx was normal [No Abdominal Bruit] : a ~M bruit was not heard ~T in the abdomen [No Edema] : there was no peripheral edema [No Palpable Aorta] : no palpable aorta [Coordination Grossly Intact] : coordination grossly intact [No Focal Deficits] : no focal deficits [Normal Gait] : normal gait [Normal] : affect was normal and insight and judgment were intact [de-identified] : + bulging eyes bilaterally  [de-identified] : Patches of hair loss, negative pull test

## 2023-09-22 ENCOUNTER — APPOINTMENT (OUTPATIENT)
Dept: INTERNAL MEDICINE | Facility: CLINIC | Age: 43
End: 2023-09-22
Payer: COMMERCIAL

## 2023-09-22 PROCEDURE — 36415 COLL VENOUS BLD VENIPUNCTURE: CPT

## 2023-09-27 LAB
ALBUMIN SERPL ELPH-MCNC: 4.9 G/DL
ALP BLD-CCNC: 81 U/L
ALT SERPL-CCNC: 21 U/L
ANION GAP SERPL CALC-SCNC: 12 MMOL/L
AST SERPL-CCNC: 30 U/L
BASOPHILS # BLD AUTO: 0.04 K/UL
BASOPHILS NFR BLD AUTO: 0.7 %
BILIRUB SERPL-MCNC: 0.3 MG/DL
BUN SERPL-MCNC: 16 MG/DL
CALCIUM SERPL-MCNC: 9.1 MG/DL
CHLORIDE SERPL-SCNC: 103 MMOL/L
CHOLEST SERPL-MCNC: 218 MG/DL
CO2 SERPL-SCNC: 24 MMOL/L
CREAT SERPL-MCNC: 0.85 MG/DL
EGFR: 88 ML/MIN/1.73M2
EOSINOPHIL # BLD AUTO: 0.06 K/UL
EOSINOPHIL NFR BLD AUTO: 1 %
ESTIMATED AVERAGE GLUCOSE: 108 MG/DL
GLUCOSE SERPL-MCNC: 65 MG/DL
HBA1C MFR BLD HPLC: 5.4 %
HCT VFR BLD CALC: 44.7 %
HDLC SERPL-MCNC: 74 MG/DL
HGB BLD-MCNC: 14.4 G/DL
IMM GRANULOCYTES NFR BLD AUTO: 0 %
LDLC SERPL CALC-MCNC: 136 MG/DL
LYMPHOCYTES # BLD AUTO: 1.63 K/UL
LYMPHOCYTES NFR BLD AUTO: 28.2 %
MAN DIFF?: NORMAL
MCHC RBC-ENTMCNC: 31.4 PG
MCHC RBC-ENTMCNC: 32.2 GM/DL
MCV RBC AUTO: 97.6 FL
MONOCYTES # BLD AUTO: 0.48 K/UL
MONOCYTES NFR BLD AUTO: 8.3 %
NEUTROPHILS # BLD AUTO: 3.56 K/UL
NEUTROPHILS NFR BLD AUTO: 61.8 %
NONHDLC SERPL-MCNC: 143 MG/DL
PLATELET # BLD AUTO: 212 K/UL
POTASSIUM SERPL-SCNC: 4.5 MMOL/L
PROT SERPL-MCNC: 7.5 G/DL
RBC # BLD: 4.58 M/UL
RBC # FLD: 13.9 %
SODIUM SERPL-SCNC: 139 MMOL/L
T3 SERPL-MCNC: 62 NG/DL
T3FREE SERPL-MCNC: 1.68 PG/ML
T4 FREE SERPL-MCNC: 1.3 NG/DL
TRIGL SERPL-MCNC: 43 MG/DL
TSH SERPL-ACNC: 2.52 UIU/ML
WBC # FLD AUTO: 5.77 K/UL

## 2023-10-18 ENCOUNTER — APPOINTMENT (OUTPATIENT)
Dept: CARDIOLOGY | Facility: CLINIC | Age: 43
End: 2023-10-18
Payer: COMMERCIAL

## 2023-10-18 VITALS
HEART RATE: 81 BPM | DIASTOLIC BLOOD PRESSURE: 72 MMHG | OXYGEN SATURATION: 97 % | HEIGHT: 63 IN | WEIGHT: 166 LBS | SYSTOLIC BLOOD PRESSURE: 124 MMHG | TEMPERATURE: 97.1 F | BODY MASS INDEX: 29.41 KG/M2

## 2023-10-18 PROCEDURE — 99215 OFFICE O/P EST HI 40 MIN: CPT | Mod: 25

## 2023-10-18 PROCEDURE — 93000 ELECTROCARDIOGRAM COMPLETE: CPT

## 2023-10-18 RX ORDER — AMOXICILLIN AND CLAVULANATE POTASSIUM 875; 125 MG/1; MG/1
875-125 TABLET, COATED ORAL TWICE DAILY
Qty: 20 | Refills: 0 | Status: DISCONTINUED | COMMUNITY
Start: 2023-07-13 | End: 2023-10-18

## 2023-10-18 RX ORDER — ESCITALOPRAM OXALATE 5 MG/1
5 TABLET ORAL
Qty: 45 | Refills: 0 | Status: DISCONTINUED | COMMUNITY
Start: 2023-07-13 | End: 2023-10-18

## 2023-10-18 RX ORDER — FAMOTIDINE 40 MG/1
40 TABLET, FILM COATED ORAL
Qty: 30 | Refills: 0 | Status: DISCONTINUED | COMMUNITY
Start: 2023-01-03 | End: 2023-10-18

## 2023-10-18 RX ORDER — FLUTICASONE PROPIONATE 50 UG/1
50 SPRAY, METERED NASAL DAILY
Qty: 1 | Refills: 0 | Status: DISCONTINUED | COMMUNITY
Start: 2023-04-16 | End: 2023-10-18

## 2023-10-18 RX ORDER — FLUCONAZOLE 150 MG/1
150 TABLET ORAL
Qty: 2 | Refills: 0 | Status: DISCONTINUED | COMMUNITY
Start: 2023-09-07 | End: 2023-10-18

## 2023-10-20 NOTE — ED ADULT NURSE NOTE - CCCP TRG CHIEF CMPLNT
chest pain Pt with a 2 d hx of diffuse abd pain, n/v. Pt had the same pain 2 wks ago and had labs, ct and admission.  Pt states that the pain never really went away completely.    physical - rrr. ctab. abd - soft, nt. no rebound. no guarding. no edema. no rash.    plan - labs reviewed. abdomen benign. Pt reassured and instructed to f/up with pcp. instructed to return for worsening pain, fever, recurrent vomiting, or any other concerns.

## 2023-10-29 ENCOUNTER — NON-APPOINTMENT (OUTPATIENT)
Age: 43
End: 2023-10-29

## 2023-11-02 ENCOUNTER — NON-APPOINTMENT (OUTPATIENT)
Age: 43
End: 2023-11-02

## 2023-11-02 ENCOUNTER — APPOINTMENT (OUTPATIENT)
Dept: INTERNAL MEDICINE | Facility: CLINIC | Age: 43
End: 2023-11-02
Payer: COMMERCIAL

## 2023-11-02 DIAGNOSIS — I49.3 VENTRICULAR PREMATURE DEPOLARIZATION: ICD-10-CM

## 2023-11-02 PROCEDURE — 99214 OFFICE O/P EST MOD 30 MIN: CPT | Mod: 25

## 2023-11-02 PROCEDURE — 93000 ELECTROCARDIOGRAM COMPLETE: CPT

## 2023-11-03 LAB
T3 SERPL-MCNC: 56 NG/DL
T3FREE SERPL-MCNC: 1.74 PG/ML
T4 FREE SERPL-MCNC: 1.2 NG/DL
T4 SERPL-MCNC: 8 UG/DL
TSH SERPL-ACNC: 3.06 UIU/ML

## 2023-11-27 ENCOUNTER — APPOINTMENT (OUTPATIENT)
Dept: GASTROENTEROLOGY | Facility: CLINIC | Age: 43
End: 2023-11-27
Payer: COMMERCIAL

## 2023-11-27 VITALS
WEIGHT: 160 LBS | DIASTOLIC BLOOD PRESSURE: 56 MMHG | OXYGEN SATURATION: 98 % | HEIGHT: 63 IN | RESPIRATION RATE: 15 BRPM | SYSTOLIC BLOOD PRESSURE: 96 MMHG | BODY MASS INDEX: 28.35 KG/M2 | HEART RATE: 70 BPM

## 2023-11-27 DIAGNOSIS — R14.0 ABDOMINAL DISTENSION (GASEOUS): ICD-10-CM

## 2023-11-27 DIAGNOSIS — K58.1 IRRITABLE BOWEL SYNDROME WITH CONSTIPATION: ICD-10-CM

## 2023-11-27 DIAGNOSIS — Z76.89 PERSONS ENCOUNTERING HEALTH SERVICES IN OTHER SPECIFIED CIRCUMSTANCES: ICD-10-CM

## 2023-11-27 PROCEDURE — 99204 OFFICE O/P NEW MOD 45 MIN: CPT

## 2023-12-14 ENCOUNTER — APPOINTMENT (OUTPATIENT)
Dept: INTERNAL MEDICINE | Facility: CLINIC | Age: 43
End: 2023-12-14
Payer: COMMERCIAL

## 2023-12-14 VITALS
WEIGHT: 178 LBS | DIASTOLIC BLOOD PRESSURE: 60 MMHG | TEMPERATURE: 97.3 F | HEART RATE: 80 BPM | RESPIRATION RATE: 15 BRPM | SYSTOLIC BLOOD PRESSURE: 102 MMHG | BODY MASS INDEX: 31.54 KG/M2 | OXYGEN SATURATION: 98 % | HEIGHT: 63 IN

## 2023-12-14 VITALS
OXYGEN SATURATION: 98 % | BODY MASS INDEX: 31.18 KG/M2 | SYSTOLIC BLOOD PRESSURE: 107 MMHG | WEIGHT: 176 LBS | HEART RATE: 80 BPM | RESPIRATION RATE: 15 BRPM | TEMPERATURE: 97.3 F | HEIGHT: 63 IN | DIASTOLIC BLOOD PRESSURE: 60 MMHG

## 2023-12-14 DIAGNOSIS — R73.03 PREDIABETES.: ICD-10-CM

## 2023-12-14 PROCEDURE — 99214 OFFICE O/P EST MOD 30 MIN: CPT | Mod: 25

## 2023-12-14 NOTE — PHYSICAL EXAM
[Normal Sclera/Conjunctiva] : normal sclera/conjunctiva [PERRL] : pupils equal round and reactive to light [EOMI] : extraocular movements intact [Normal Outer Ear/Nose] : the outer ears and nose were normal in appearance [Normal Oropharynx] : the oropharynx was normal [No Abdominal Bruit] : a ~M bruit was not heard ~T in the abdomen [No Edema] : there was no peripheral edema [No Palpable Aorta] : no palpable aorta [Coordination Grossly Intact] : coordination grossly intact [No Focal Deficits] : no focal deficits [Normal Gait] : normal gait [Normal] : affect was normal and insight and judgment were intact [de-identified] : + bulging eyes bilaterally  [de-identified] : Patches of hair loss, negative pull test

## 2023-12-14 NOTE — ASSESSMENT
[FreeTextEntry1] : Marimar is a 42 yo F w PMHx graves disease s/p thyroidectomy now hypothyroid, current smoker, mitral valve prolapse, IBS Continues to have palpitations and episodic shortness of breath. States metoprolol has not really helped. Patient will be seeing her cardio soon. She thinks it might be related to her anxiety as well but she does not want to take medications for it.  Sometimes she feels pulsation on her brain.  Was started on linzess for IBS and following with GI   Palpitations Discussed with patient the benign nature of PVCs. When she feels them she should try and relax and take deep breaths until they subside. Advised to hydrate well.  HLD / Prediabetes  Educated eating whole grain foods rich in soluble fiber such as oats and Omega 3 rich fish such as salmon, tout, sardines and bean based meals such as kidney beans, chickpeas and lentils. Small protions of nuts. Limit sugars and alcohol.  FU lipids panel   rto 3 mo

## 2023-12-14 NOTE — HISTORY OF PRESENT ILLNESS
[FreeTextEntry1] : fu symptoms  [de-identified] : Marimar is a 42 yo F w PMHx graves disease s/p thyroidectomy now hypothyroid, current smoker, mitral valve prolapse, IBS Continues to have palpitations and episodic shortness of breath. States metoprolol has not really helped. Patient will be seeing her cardio soon. She thinks it might be related to her anxiety as well but she does not want to take medications for it.  Sometimes she feels pulsation on her brain.  Was started on linzess for IBS and following with GI

## 2023-12-14 NOTE — PHYSICAL EXAM
[Normal Sclera/Conjunctiva] : normal sclera/conjunctiva [PERRL] : pupils equal round and reactive to light [EOMI] : extraocular movements intact [Normal Outer Ear/Nose] : the outer ears and nose were normal in appearance [Normal Oropharynx] : the oropharynx was normal [No Abdominal Bruit] : a ~M bruit was not heard ~T in the abdomen [No Edema] : there was no peripheral edema [No Palpable Aorta] : no palpable aorta [Coordination Grossly Intact] : coordination grossly intact [No Focal Deficits] : no focal deficits [Normal Gait] : normal gait [Normal] : affect was normal and insight and judgment were intact [de-identified] : + bulging eyes bilaterally  [de-identified] : Patches of hair loss, negative pull test

## 2023-12-14 NOTE — HISTORY OF PRESENT ILLNESS
[FreeTextEntry1] : fu symptoms  [de-identified] : Marimar is a 44 yo F w PMHx graves disease s/p thyroidectomy now hypothyroid, current smoker, mitral valve prolapse, IBS Continues to have palpitations and episodic shortness of breath. States metoprolol has not really helped. Patient will be seeing her cardio soon. She thinks it might be related to her anxiety as well but she does not want to take medications for it.  Sometimes she feels pulsation on her brain.  Was started on linzess for IBS and following with GI

## 2023-12-18 LAB
CHOLEST SERPL-MCNC: 226 MG/DL
ESTIMATED AVERAGE GLUCOSE: 114 MG/DL
HBA1C MFR BLD HPLC: 5.6 %
HDLC SERPL-MCNC: 68 MG/DL
LDLC SERPL CALC-MCNC: 149 MG/DL
NONHDLC SERPL-MCNC: 157 MG/DL
TRIGL SERPL-MCNC: 50 MG/DL

## 2024-01-10 DIAGNOSIS — J01.10 ACUTE FRONTAL SINUSITIS, UNSPECIFIED: ICD-10-CM

## 2024-01-17 ENCOUNTER — RX RENEWAL (OUTPATIENT)
Age: 44
End: 2024-01-17

## 2024-01-17 RX ORDER — METOPROLOL SUCCINATE 25 MG/1
25 TABLET, EXTENDED RELEASE ORAL
Qty: 90 | Refills: 3 | Status: ACTIVE | COMMUNITY
Start: 2023-10-18 | End: 1900-01-01

## 2024-01-25 RX ORDER — LINACLOTIDE 145 UG/1
145 CAPSULE, GELATIN COATED ORAL
Qty: 90 | Refills: 1 | Status: ACTIVE | COMMUNITY
Start: 1900-01-01 | End: 1900-01-01

## 2024-02-26 ENCOUNTER — NON-APPOINTMENT (OUTPATIENT)
Age: 44
End: 2024-02-26

## 2024-03-01 ENCOUNTER — APPOINTMENT (OUTPATIENT)
Dept: GASTROENTEROLOGY | Facility: CLINIC | Age: 44
End: 2024-03-01
Payer: COMMERCIAL

## 2024-03-01 ENCOUNTER — APPOINTMENT (OUTPATIENT)
Dept: GASTROENTEROLOGY | Facility: CLINIC | Age: 44
End: 2024-03-01

## 2024-03-01 DIAGNOSIS — R10.84 GENERALIZED ABDOMINAL PAIN: ICD-10-CM

## 2024-03-01 DIAGNOSIS — R19.4 CHANGE IN BOWEL HABIT: ICD-10-CM

## 2024-03-01 PROCEDURE — 99443: CPT

## 2024-03-01 RX ORDER — POLYETHYLENE GLYCOL 3350 17 G/17G
17 POWDER, FOR SOLUTION ORAL
Qty: 238 | Refills: 0 | Status: ACTIVE | COMMUNITY
Start: 2024-03-01 | End: 1900-01-01

## 2024-03-03 NOTE — PHYSICAL EXAM
Order placed under Dr. Hawley. Linked to appt. Results CCed to [facility] RN pool.  
Patient has an order from Dr. Chidi Hawley.    Appointment is on 07/15 at 2:15 pm.   
[de-identified] : Telephonic visit, no PE performed

## 2024-03-03 NOTE — ASSESSMENT
[FreeTextEntry1] : - Colonoscopy to be performed for difficulty passing stools and irregular bowel habits despite Linzess as well as cramping abdominal pain - Continue Linzess 145mcg every other day as 72mcg daily is not enough to have complete bm and 145 daily causes daily diarrhea. Other OTC laxatives have not been enough in the past.  - CT AP - Can consider motility evaluation if colonoscopy and CT unremarkable and patient continues to have difficulty passing stools.

## 2024-03-14 ENCOUNTER — APPOINTMENT (OUTPATIENT)
Dept: INTERNAL MEDICINE | Facility: CLINIC | Age: 44
End: 2024-03-14
Payer: COMMERCIAL

## 2024-03-14 VITALS
DIASTOLIC BLOOD PRESSURE: 56 MMHG | BODY MASS INDEX: 29.95 KG/M2 | RESPIRATION RATE: 16 BRPM | HEIGHT: 63 IN | WEIGHT: 169 LBS | OXYGEN SATURATION: 100 % | TEMPERATURE: 98.2 F | HEART RATE: 65 BPM | SYSTOLIC BLOOD PRESSURE: 101 MMHG

## 2024-03-14 PROCEDURE — 99214 OFFICE O/P EST MOD 30 MIN: CPT | Mod: 25

## 2024-03-14 NOTE — PHYSICAL EXAM
[Normal Sclera/Conjunctiva] : normal sclera/conjunctiva [PERRL] : pupils equal round and reactive to light [EOMI] : extraocular movements intact [Normal Outer Ear/Nose] : the outer ears and nose were normal in appearance [Normal Oropharynx] : the oropharynx was normal [No Abdominal Bruit] : a ~M bruit was not heard ~T in the abdomen [No Edema] : there was no peripheral edema [No Palpable Aorta] : no palpable aorta [Coordination Grossly Intact] : coordination grossly intact [No Focal Deficits] : no focal deficits [Normal Gait] : normal gait [Normal] : affect was normal and insight and judgment were intact [de-identified] : + bulging eyes bilaterally  [de-identified] : Patches of hair loss, negative pull test

## 2024-03-14 NOTE — HISTORY OF PRESENT ILLNESS
[FreeTextEntry1] :  hypothyroid, current smoker, mitral valve prolapse, IBS [de-identified] : Marimar is a 44 yo F w PMHx graves disease s/p thyroidectomy now hypothyroid, current smoker, mitral valve prolapse, IBS Patient is is going through a tough time as her step father was recently diagnosed with pancreatic cancer.  She will be going for a TTE to assess MVP  She will be having colonoscopy for constipation

## 2024-03-14 NOTE — ASSESSMENT
[FreeTextEntry1] : Marimar is a 42 yo F w PMHx graves disease s/p thyroidectomy now hypothyroid, current smoker, mitral valve prolapse, IBS  Hypothyroidism, fu TFTs   Palpitations Discussed with patient the benign nature of PVCs. When she feels them she should try and relax and take deep breaths until they subside. Advised to hydrate well.  HLD / Prediabetes  Educated eating whole grain foods rich in soluble fiber such as oats and Omega 3 rich fish such as salmon, tout, sardines and bean based meals such as kidney beans, chickpeas and lentils. Small protions of nuts. Limit sugars and alcohol.  FU lipids panel   rto 3 mo

## 2024-03-17 LAB
ALBUMIN SERPL ELPH-MCNC: 4.8 G/DL
ALP BLD-CCNC: 79 U/L
ALT SERPL-CCNC: 25 U/L
ANION GAP SERPL CALC-SCNC: 13 MMOL/L
AST SERPL-CCNC: 29 U/L
BASOPHILS # BLD AUTO: 0.03 K/UL
BASOPHILS NFR BLD AUTO: 0.5 %
BILIRUB SERPL-MCNC: 0.4 MG/DL
BUN SERPL-MCNC: 18 MG/DL
CALCIUM SERPL-MCNC: 9.6 MG/DL
CHLORIDE SERPL-SCNC: 103 MMOL/L
CHOLEST SERPL-MCNC: 168 MG/DL
CO2 SERPL-SCNC: 24 MMOL/L
CREAT SERPL-MCNC: 0.86 MG/DL
EGFR: 87 ML/MIN/1.73M2
EOSINOPHIL # BLD AUTO: 0.05 K/UL
EOSINOPHIL NFR BLD AUTO: 0.9 %
ESTIMATED AVERAGE GLUCOSE: 117 MG/DL
GLUCOSE SERPL-MCNC: 85 MG/DL
HBA1C MFR BLD HPLC: 5.7 %
HCT VFR BLD CALC: 45.3 %
HDLC SERPL-MCNC: 74 MG/DL
HGB BLD-MCNC: 15 G/DL
IMM GRANULOCYTES NFR BLD AUTO: 0.2 %
LDLC SERPL CALC-MCNC: 86 MG/DL
LYMPHOCYTES # BLD AUTO: 1.78 K/UL
LYMPHOCYTES NFR BLD AUTO: 31.1 %
MAN DIFF?: NORMAL
MCHC RBC-ENTMCNC: 31 PG
MCHC RBC-ENTMCNC: 33.1 GM/DL
MCV RBC AUTO: 93.6 FL
MONOCYTES # BLD AUTO: 0.47 K/UL
MONOCYTES NFR BLD AUTO: 8.2 %
NEUTROPHILS # BLD AUTO: 3.39 K/UL
NEUTROPHILS NFR BLD AUTO: 59.1 %
NONHDLC SERPL-MCNC: 95 MG/DL
PLATELET # BLD AUTO: 197 K/UL
POTASSIUM SERPL-SCNC: 4.7 MMOL/L
PROT SERPL-MCNC: 7.5 G/DL
RBC # BLD: 4.84 M/UL
RBC # FLD: 13.9 %
SODIUM SERPL-SCNC: 141 MMOL/L
TRIGL SERPL-MCNC: 41 MG/DL
TSH SERPL-ACNC: 1.68 UIU/ML
WBC # FLD AUTO: 5.73 K/UL

## 2024-03-22 VITALS — WEIGHT: 169.98 LBS | HEART RATE: 65 BPM | RESPIRATION RATE: 16 BRPM | HEIGHT: 64 IN

## 2024-03-22 NOTE — H&P PST ADULT - ASSESSMENT
42 yo female with known MV prolapse for TOREY.     Plan: PRE-PROCEDURE ASSESSMENT    -Patient seen and examined  PRE-PROCEDURE ASSESSMENT  -NPO after midnight confirmed  -IV insert  -Patient seen and examined  -Labs reviewed  -Pre-procedure teaching completed with patient   -consent obtained   -Questions answered

## 2024-03-22 NOTE — H&P PST ADULT - HISTORY OF PRESENT ILLNESS
Marimar is a 44 yo F w PMHx graves disease s/p thyroidectomy now hypothyroid, current smoker, mitral valve prolapse, IBS  Continues to have palpitations and episodic shortness of breath. States metoprolol has not really helped.       Summary:  1. The left atrium is normal in size.  2. Normal wall motion. LV EF by Biplane MOD = 55%.  3. The right atrium is normal in size.  4. Normal right ventricular size and function.  5. Mild to moderate mitral valve regurgitation.  6. There is no evidence of pericardial effusion.    [id number] Dina Perry MD, VI, Electronically signed on 4/10/2023 at 5:17:45 PM    Prior TOREY in 9/2021< from: TOREY Echo Doppler (09.08.21 @ 09:26) >  Summary:   1. Left ventricular ejection fraction, by visual estimation, is 55 to 60%, by 3D volume 59%, LV  ml.   2. Normal global left ventricular systolic function.   3. Normal right ventricular size and function, estimated RVSP 16 mmHg.   4. Mildly enlarged left atrium.   5. Prolapse of the anterior leaflet involving the A2 and A3 scallops.   6. Moderate-to-severe mitral valve regurgitation. There are 2 separate jets, the dominant jet is posteriorly directed with EROA (by PISA) of 0.20 cm2 and regurgitant volume of 39 ml. There is no systolic pulmonary reversal.   7. No left atrial appendage thrombus and normal left atrial appendage velocities.   8. Mild tricuspid regurgitation.   9. Color flow doppler and intravenous injection of agitated saline demonstrates the presence of an intact intra atrial septum.  10. There is no evidence of pericardial effusion.  11. Compared to the priorTEE study from 10/7/16, LV systolic function/EF remain preserved, known anterior mitral leaflet prolapse with slight progression of mitral regurgitation.    Guero Duarte DO Electronically signed on 9/8/2021 at 12:53:57 PM    < end of copied text >      She saw Dr. Monique last April and they wrote in note for her to repeat TTE in 1 year which would be April 2024, .     Patient presents now for a repeat TOREY to assess Mitral Valve status.   Marimar is a 44 yo F w PMHx graves disease s/p thyroidectomy now hypothyroid, current smoker, mitral valve prolapse, IBS  Continues to have palpitations and episodic shortness of breath. States metoprolol has not really helped.       Summary:  1. The left atrium is normal in size.  2. Normal wall motion. LV EF by Biplane MOD = 55%.  3. The right atrium is normal in size.  4. Normal right ventricular size and function.  5. Mild to moderate mitral valve regurgitation.  6. There is no evidence of pericardial effusion.    [id number] Dina Perry MD, [facility], Electronically signed on 4/10/2023 at 5:17:45 PM    Prior TOREY in 9/2021< from: TOREY Echo Doppler (09.08.21 @ 09:26) >  Summary:   1. Left ventricular ejection fraction, by visual estimation, is 55 to 60%, by 3D volume 59%, LV  ml.   2. Normal global left ventricular systolic function.   3. Normal right ventricular size and function, estimated RVSP 16 mmHg.   4. Mildly enlarged left atrium.   5. Prolapse of the anterior leaflet involving the A2 and A3 scallops.   6. Moderate-to-severe mitral valve regurgitation. There are 2 separate jets, the dominant jet is posteriorly directed with EROA (by PISA) of 0.20 cm2 and regurgitant volume of 39 ml. There is no systolic pulmonary reversal.   7. No left atrial appendage thrombus and normal left atrial appendage velocities.   8. Mild tricuspid regurgitation.   9. Color flow doppler and intravenous injection of agitated saline demonstrates the presence of an intact intra atrial septum.  10. There is no evidence of pericardial effusion.  11. Compared to the priorTEE study from 10/7/16, LV systolic function/EF remain preserved, known anterior mitral leaflet prolapse with slight progression of mitral regurgitation.    Guero Duarte DO Electronically signed on 9/8/2021 at 12:53:57 PM    < end of copied text >      She saw Dr. Monique last April and they wrote in note for her to repeat TTE in 1 year which would be April 2024, .     Patient presents now for a repeat TOREY to assess Mitral Valve status.      LABS:                          13.9   6.06  )-----------( 181      ( 25 Mar 2024 07:45 )             42.0     03-25    138  |  105  |  19.4  ----------------------------<  92  4.5   |  26.0  |  0.78    Ca    8.7      25 Mar 2024 07:45

## 2024-03-25 ENCOUNTER — TRANSCRIPTION ENCOUNTER (OUTPATIENT)
Age: 44
End: 2024-03-25

## 2024-03-25 ENCOUNTER — RESULT REVIEW (OUTPATIENT)
Age: 44
End: 2024-03-25

## 2024-03-25 ENCOUNTER — OUTPATIENT (OUTPATIENT)
Dept: OUTPATIENT SERVICES | Facility: HOSPITAL | Age: 44
LOS: 1 days | End: 2024-03-25
Payer: COMMERCIAL

## 2024-03-25 VITALS
HEART RATE: 62 BPM | RESPIRATION RATE: 16 BRPM | SYSTOLIC BLOOD PRESSURE: 97 MMHG | OXYGEN SATURATION: 97 % | DIASTOLIC BLOOD PRESSURE: 56 MMHG

## 2024-03-25 DIAGNOSIS — E89.0 POSTPROCEDURAL HYPOTHYROIDISM: Chronic | ICD-10-CM

## 2024-03-25 DIAGNOSIS — I34.0 NONRHEUMATIC MITRAL (VALVE) INSUFFICIENCY: ICD-10-CM

## 2024-03-25 LAB
ANION GAP SERPL CALC-SCNC: 7 MMOL/L — SIGNIFICANT CHANGE UP (ref 5–17)
BASOPHILS # BLD AUTO: 0.05 K/UL — SIGNIFICANT CHANGE UP (ref 0–0.2)
BASOPHILS NFR BLD AUTO: 0.8 % — SIGNIFICANT CHANGE UP (ref 0–2)
BUN SERPL-MCNC: 19.4 MG/DL — SIGNIFICANT CHANGE UP (ref 8–20)
CALCIUM SERPL-MCNC: 8.7 MG/DL — SIGNIFICANT CHANGE UP (ref 8.4–10.5)
CHLORIDE SERPL-SCNC: 105 MMOL/L — SIGNIFICANT CHANGE UP (ref 96–108)
CO2 SERPL-SCNC: 26 MMOL/L — SIGNIFICANT CHANGE UP (ref 22–29)
CREAT SERPL-MCNC: 0.78 MG/DL — SIGNIFICANT CHANGE UP (ref 0.5–1.3)
EGFR: 98 ML/MIN/1.73M2 — SIGNIFICANT CHANGE UP
EOSINOPHIL # BLD AUTO: 0.14 K/UL — SIGNIFICANT CHANGE UP (ref 0–0.5)
EOSINOPHIL NFR BLD AUTO: 2.3 % — SIGNIFICANT CHANGE UP (ref 0–6)
GLUCOSE SERPL-MCNC: 92 MG/DL — SIGNIFICANT CHANGE UP (ref 70–99)
HCG SERPL QL: NEGATIVE — SIGNIFICANT CHANGE UP
HCT VFR BLD CALC: 42 % — SIGNIFICANT CHANGE UP (ref 34.5–45)
HGB BLD-MCNC: 13.9 G/DL — SIGNIFICANT CHANGE UP (ref 11.5–15.5)
IMM GRANULOCYTES NFR BLD AUTO: 0.2 % — SIGNIFICANT CHANGE UP (ref 0–0.9)
LYMPHOCYTES # BLD AUTO: 2.08 K/UL — SIGNIFICANT CHANGE UP (ref 1–3.3)
LYMPHOCYTES # BLD AUTO: 34.3 % — SIGNIFICANT CHANGE UP (ref 13–44)
MCHC RBC-ENTMCNC: 31.2 PG — SIGNIFICANT CHANGE UP (ref 27–34)
MCHC RBC-ENTMCNC: 33.1 GM/DL — SIGNIFICANT CHANGE UP (ref 32–36)
MCV RBC AUTO: 94.4 FL — SIGNIFICANT CHANGE UP (ref 80–100)
MONOCYTES # BLD AUTO: 0.62 K/UL — SIGNIFICANT CHANGE UP (ref 0–0.9)
MONOCYTES NFR BLD AUTO: 10.2 % — SIGNIFICANT CHANGE UP (ref 2–14)
NEUTROPHILS # BLD AUTO: 3.16 K/UL — SIGNIFICANT CHANGE UP (ref 1.8–7.4)
NEUTROPHILS NFR BLD AUTO: 52.2 % — SIGNIFICANT CHANGE UP (ref 43–77)
PLATELET # BLD AUTO: 181 K/UL — SIGNIFICANT CHANGE UP (ref 150–400)
POTASSIUM SERPL-MCNC: 4.5 MMOL/L — SIGNIFICANT CHANGE UP (ref 3.5–5.3)
POTASSIUM SERPL-SCNC: 4.5 MMOL/L — SIGNIFICANT CHANGE UP (ref 3.5–5.3)
RBC # BLD: 4.45 M/UL — SIGNIFICANT CHANGE UP (ref 3.8–5.2)
RBC # FLD: 12.7 % — SIGNIFICANT CHANGE UP (ref 10.3–14.5)
SODIUM SERPL-SCNC: 138 MMOL/L — SIGNIFICANT CHANGE UP (ref 135–145)
WBC # BLD: 6.06 K/UL — SIGNIFICANT CHANGE UP (ref 3.8–10.5)
WBC # FLD AUTO: 6.06 K/UL — SIGNIFICANT CHANGE UP (ref 3.8–10.5)

## 2024-03-25 PROCEDURE — 93010 ELECTROCARDIOGRAM REPORT: CPT

## 2024-03-25 PROCEDURE — 93312 ECHO TRANSESOPHAGEAL: CPT

## 2024-03-25 PROCEDURE — 93325 DOPPLER ECHO COLOR FLOW MAPG: CPT

## 2024-03-25 PROCEDURE — 93325 DOPPLER ECHO COLOR FLOW MAPG: CPT | Mod: 26

## 2024-03-25 PROCEDURE — 36415 COLL VENOUS BLD VENIPUNCTURE: CPT

## 2024-03-25 PROCEDURE — 80048 BASIC METABOLIC PNL TOTAL CA: CPT

## 2024-03-25 PROCEDURE — 93005 ELECTROCARDIOGRAM TRACING: CPT

## 2024-03-25 PROCEDURE — 93320 DOPPLER ECHO COMPLETE: CPT

## 2024-03-25 PROCEDURE — 85025 COMPLETE CBC W/AUTO DIFF WBC: CPT

## 2024-03-25 PROCEDURE — 93320 DOPPLER ECHO COMPLETE: CPT | Mod: 26

## 2024-03-25 PROCEDURE — 93312 ECHO TRANSESOPHAGEAL: CPT | Mod: 26

## 2024-03-25 PROCEDURE — 84703 CHORIONIC GONADOTROPIN ASSAY: CPT

## 2024-03-25 RX ORDER — CETIRIZINE HYDROCHLORIDE 10 MG/1
1 TABLET ORAL
Qty: 0 | Refills: 0 | DISCHARGE

## 2024-03-25 RX ORDER — BUDESONIDE 32 UG/1
0 SPRAY, METERED NASAL
Qty: 0 | Refills: 0 | DISCHARGE

## 2024-03-25 RX ORDER — METOPROLOL TARTRATE 50 MG
1 TABLET ORAL
Refills: 0 | DISCHARGE

## 2024-03-25 RX ORDER — ROSUVASTATIN CALCIUM 5 MG/1
0.5 TABLET ORAL
Refills: 0 | DISCHARGE

## 2024-03-25 RX ORDER — LEVOTHYROXINE SODIUM 125 MCG
1 TABLET ORAL
Refills: 0 | DISCHARGE

## 2024-03-25 NOTE — DISCHARGE NOTE PROVIDER - NSDCCPTREATMENT_GEN_ALL_CORE_FT
PRINCIPAL PROCEDURE  Procedure: Transesophageal echocardiogram (TOREY)  Findings and Treatment:

## 2024-03-25 NOTE — DISCHARGE NOTE PROVIDER - NSDCCPCAREPLAN_GEN_ALL_CORE_FT
PRINCIPAL DISCHARGE DIAGNOSIS  Diagnosis: Mitral valve prolapse  Assessment and Plan of Treatment:

## 2024-03-25 NOTE — DISCHARGE NOTE PROVIDER - CARE PROVIDER_API CALL
Selim, Samy Mohsen  Interventional Cardiology  [address], [address]  [place] [address]  Phone: [phone]  Fax: [phone]  Follow Up Time: 1 month

## 2024-03-25 NOTE — DISCHARGE NOTE NURSING/CASE MANAGEMENT/SOCIAL WORK - NSDCPEFALRISK_GEN_ALL_CORE
For information on Fall & Injury Prevention, visit: [email or web address] OR  [email or web address] OR  https://www.cdc.gov/steadi/patient.html

## 2024-03-25 NOTE — DISCHARGE NOTE PROVIDER - NSDCFUSCHEDAPPT_GEN_ALL_CORE_FT
Stuart Sheppard  [facility]  CARDIOLOGY [address]  Scheduled Appointment: 04/17/2024

## 2024-03-25 NOTE — DISCHARGE NOTE PROVIDER - NSDCMRMEDTOKEN_GEN_ALL_CORE_FT
metoprolol succinate 25 mg oral capsule, extended release: 1 cap(s) orally once a day  rosuvastatin 10 mg oral tablet: 0.5 tab(s) orally once a day (at bedtime)  Synthroid 75 mcg (0.075 mg) oral tablet: 1 tab(s) orally once a day

## 2024-03-25 NOTE — DISCHARGE NOTE NURSING/CASE MANAGEMENT/SOCIAL WORK - PATIENT PORTAL LINK FT
You can access the FollowMyHealth Patient Portal offered by [facility] by registering at the following website: [email or web address]. By joining [facility]’s FollowMyHealth portal, you will also be able to view your health information using other applications (apps) compatible with our system.

## 2024-03-25 NOTE — DISCHARGE NOTE PROVIDER - HOSPITAL COURSE
HPI:  Marimar is a 42 yo F w PMHx graves disease s/p thyroidectomy now hypothyroid, current smoker, mitral valve prolapse, IBS  Continues to have palpitations and episodic shortness of breath. States metoprolol has not really helped.       Summary:  1. The left atrium is normal in size.  2. Normal wall motion. LV EF by Biplane MOD = 55%.  3. The right atrium is normal in size.  4. Normal right ventricular size and function.  5. Mild to moderate mitral valve regurgitation.  6. There is no evidence of pericardial effusion.    [id number] Dina Perry MD, RPVI, Electronically signed on 4/10/2023 at 5:17:45 PM     (22 Mar 2024 18:05)    Pt now sp TOREY revealing moderate MR  Pt to fu with Dr Sheppard

## 2024-04-09 ENCOUNTER — APPOINTMENT (OUTPATIENT)
Dept: CARDIOTHORACIC SURGERY | Facility: CLINIC | Age: 44
End: 2024-04-09
Payer: COMMERCIAL

## 2024-04-09 VITALS
SYSTOLIC BLOOD PRESSURE: 114 MMHG | WEIGHT: 168 LBS | DIASTOLIC BLOOD PRESSURE: 71 MMHG | OXYGEN SATURATION: 99 % | HEART RATE: 73 BPM | HEIGHT: 63 IN | RESPIRATION RATE: 16 BRPM | BODY MASS INDEX: 29.77 KG/M2

## 2024-04-09 DIAGNOSIS — I07.1 RHEUMATIC TRICUSPID INSUFFICIENCY: ICD-10-CM

## 2024-04-09 DIAGNOSIS — I34.1 NONRHEUMATIC MITRAL (VALVE) PROLAPSE: ICD-10-CM

## 2024-04-09 DIAGNOSIS — I34.0 NONRHEUMATIC MITRAL (VALVE) INSUFFICIENCY: ICD-10-CM

## 2024-04-09 PROCEDURE — 99213 OFFICE O/P EST LOW 20 MIN: CPT

## 2024-04-09 RX ORDER — METHYLPREDNISOLONE 4 MG/1
4 TABLET ORAL
Qty: 1 | Refills: 1 | Status: COMPLETED | COMMUNITY
Start: 2024-01-10 | End: 2024-04-09

## 2024-04-09 NOTE — ASSESSMENT
[FreeTextEntry1] : Ms. CERRATO is a 42 year old female referred by Dr. Sheppard who presents for follow up care. Her past medical history includes Graves Disease (thyroidectomy), IBS, anxiety, dental infection and abscess history, current smoker and tricuspid as well as mitral valve disease.  Independent review of imaging and independent interpretation was performed at today's visit. There is moderate MR noted on echocardiogram. We discussed that no intervention is required at this time as she is asymptomatic and there is no evidence of cardiac dysfunction. If she is short of breath and compromised she should have intervention. She is not currently on diuretic therapy and appears euvolemic. On echocardiogram the mitral valve appears repairable. She will likely require repair/replacement in the future. She verbalized understanding, and will follow up in 1 year (sooner if symptomatic) with echocardiogram imaging.   PLAN: - Echocardiogram in 1 year - Return to care earlier if symptoms arise      I, Dr. Monique personally performed the evaluation and management (E/M) services for this patient. That E/M includes conducting the initial examination, assessing all conditions, and establishing the plan of care. Today, Devan Ng NP was here to observe my evaluation and management services for this patient.

## 2024-04-09 NOTE — REVIEW OF SYSTEMS
[Negative] : Heme/Lymph [Feeling Poorly] : not feeling poorly [Feeling Tired] : not feeling tired [Chest Pain] : no chest pain [Palpitations] : no palpitations [Lower Ext Edema] : no lower extremity edema [Shortness Of Breath] : no shortness of breath [SOB on Exertion] : no shortness of breath during exertion

## 2024-04-09 NOTE — HISTORY OF PRESENT ILLNESS
[FreeTextEntry1] : Ms. CERRATO is a 42 year old female referred by Dr. Sheppard who presents for follow up care. Her past medical history includes Graves Disease (thyroidectomy), IBS, anxiety, dental infection and abscess history, current smoker and tricuspid as well as mitral valve disease.  She reports to the office today to discuss the possibility of Mitral Valve intervention. 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 Her thyroid levels have been fluctuation. She had originally established care with us in 2022 for her mitral valve regurgitation. There has been some discrepancy in imaging results from mild to severe regurgitation of the valve. She presents today after recent Transesophageal Echocardiogram imaging.   She has going to the gym and exercising since August. She is feeling overall well. She has mild shortness of breath with exertion.

## 2024-04-09 NOTE — PHYSICAL EXAM
[Sclera] : the sclera and conjunctiva were normal [Neck Appearance] : the appearance of the neck was normal [Respiration, Rhythm And Depth] : normal respiratory rhythm and effort [Auscultation Breath Sounds / Voice Sounds] : lungs were clear to auscultation bilaterally [Heart Rate And Rhythm] : heart rate was normal and rhythm regular [Heart Sounds] : normal S1 and S2 [FreeTextEntry1] : NYHAC I   Grade 2 murmur [Examination Of The Chest] : the chest was normal in appearance [Abnormal Walk] : normal gait [Skin Color & Pigmentation] : normal skin color and pigmentation [Sensation] : the sensory exam was normal to light touch and pinprick [Oriented To Time, Place, And Person] : oriented to person, place, and time [Impaired Insight] : insight and judgment were intact

## 2024-04-09 NOTE — DATA REVIEWED
[FreeTextEntry1] : Transesophageal Echocardiogram from 03/25/24 FINDINGS:  Left Ventricle: Left ventricular systolic function is normal with an ejection fraction visually estimated at 50 to 55%.  Right Ventricle: The right ventricular cavity is normal in size and normal systolic function.  Left Atrium: The left atrium is enlargement. There is no spontaneous echo contrast in the left atrial appendage and no spontaneous echo contrast in the left atrium. There is no evidence of left atrial or left atrial appendage thrombus. Emptying velocities of the left atrial appendage are normal. The pulmonary venous flow pattern is normal.  Right Atrium: The right atrium is normal in size.  Interatrial Septum: There is a patent foramen ovale with predominantly left to right shunting by color flow Doppler.  Aortic Valve: The aortic valve appears trileaflet. There is no aortic valve stenosis. There is no evidence of aortic regurgitation.  Mitral Valve: There is prolapse of the middle anterior mitral leaflet scallop (A2) and prolapse of the posteromedial mitral scallop (A3) of the anterior leaflet. There is moderate mitral regurgitation. Moderate mitral regurgitation with multiple jets.  Tricuspid Valve: Structurally normal tricuspid valve with normal leaflet excursion. There is mild tricuspid regurgitation.  Pulmonic Valve: Structurally normal pulmonic valve with normal leaflet excursion. There is trace pulmonic regurgitation.  Aorta: The ascending aorta and aortic root appear normal in size.  Pericardium: No pericardial effusion seen.

## 2024-04-17 ENCOUNTER — APPOINTMENT (OUTPATIENT)
Dept: CARDIOLOGY | Facility: CLINIC | Age: 44
End: 2024-04-17

## 2024-06-04 ENCOUNTER — APPOINTMENT (OUTPATIENT)
Dept: DERMATOLOGY | Facility: CLINIC | Age: 44
End: 2024-06-04
Payer: COMMERCIAL

## 2024-06-04 PROCEDURE — 99204 OFFICE O/P NEW MOD 45 MIN: CPT

## 2024-06-12 ENCOUNTER — RX RENEWAL (OUTPATIENT)
Age: 44
End: 2024-06-12

## 2024-06-12 RX ORDER — ROSUVASTATIN CALCIUM 5 MG/1
5 TABLET, FILM COATED ORAL
Qty: 90 | Refills: 1 | Status: ACTIVE | COMMUNITY
Start: 2023-12-20 | End: 1900-01-01

## 2024-06-24 ENCOUNTER — APPOINTMENT (OUTPATIENT)
Dept: INTERNAL MEDICINE | Facility: CLINIC | Age: 44
End: 2024-06-24
Payer: COMMERCIAL

## 2024-06-24 VITALS
OXYGEN SATURATION: 97 % | HEIGHT: 63 IN | TEMPERATURE: 97.2 F | BODY MASS INDEX: 31.36 KG/M2 | SYSTOLIC BLOOD PRESSURE: 110 MMHG | DIASTOLIC BLOOD PRESSURE: 69 MMHG | WEIGHT: 177 LBS | HEART RATE: 75 BPM | RESPIRATION RATE: 16 BRPM

## 2024-06-24 DIAGNOSIS — H05.20 UNSPECIFIED EXOPHTHALMOS: ICD-10-CM

## 2024-06-24 DIAGNOSIS — E03.9 HYPOTHYROIDISM, UNSPECIFIED: ICD-10-CM

## 2024-06-24 DIAGNOSIS — E78.5 HYPERLIPIDEMIA, UNSPECIFIED: ICD-10-CM

## 2024-06-24 DIAGNOSIS — R53.83 OTHER FATIGUE: ICD-10-CM

## 2024-06-24 PROCEDURE — 99214 OFFICE O/P EST MOD 30 MIN: CPT

## 2024-06-24 PROCEDURE — 36415 COLL VENOUS BLD VENIPUNCTURE: CPT

## 2024-06-24 NOTE — ASSESSMENT
[FreeTextEntry1] : Marimar is a 44 yo F w PMHx graves disease s/p thyroidectomy now hypothyroid, current smoker, mitral valve prolapse, IBS  Hypothyroidism, fu TFTs   HLD / Prediabetes  Educated eating whole grain foods rich in soluble fiber such as oats and Omega 3 rich fish such as salmon, tout, sardines and bean based meals such as kidney beans, chickpeas and lentils. Small protions of nuts. Limit sugars and alcohol.  FU lipids panel   IBS  Seeing GI  Will be going to colonoscopy  Provided her low fodmap diet with instructions   rto 3 mo

## 2024-06-24 NOTE — HISTORY OF PRESENT ILLNESS
[FreeTextEntry1] :  hypothyroid, current smoker, mitral valve prolapse, IBS [de-identified] : Marimar is a 44 yo F w PMHx graves disease s/p thyroidectomy now hypothyroid, current smoker, mitral valve prolapse, IBS Patient has been strugglin with bloating and early satiety. She is seeing gastro  She remains on linzess for IBS she is not feeling better (72 mcg does not help and 140 was too much)- She will be going for a colonoscopy. She is also seeing her gyn

## 2024-06-25 LAB
ALBUMIN SERPL ELPH-MCNC: 4.5 G/DL
ALP BLD-CCNC: 84 U/L
ALT SERPL-CCNC: 28 U/L
ANION GAP SERPL CALC-SCNC: 14 MMOL/L
AST SERPL-CCNC: 28 U/L
BASOPHILS # BLD AUTO: 0.04 K/UL
BASOPHILS NFR BLD AUTO: 0.6 %
BILIRUB SERPL-MCNC: 0.3 MG/DL
BUN SERPL-MCNC: 15 MG/DL
CALCIUM SERPL-MCNC: 9.1 MG/DL
CHLORIDE SERPL-SCNC: 103 MMOL/L
CHOLEST SERPL-MCNC: 139 MG/DL
CO2 SERPL-SCNC: 24 MMOL/L
CREAT SERPL-MCNC: 0.88 MG/DL
EGFR: 85 ML/MIN/1.73M2
EOSINOPHIL # BLD AUTO: 0.06 K/UL
EOSINOPHIL NFR BLD AUTO: 0.8 %
ESTIMATED AVERAGE GLUCOSE: 114 MG/DL
GLUCOSE SERPL-MCNC: 80 MG/DL
HBA1C MFR BLD HPLC: 5.6 %
HCT VFR BLD CALC: 45 %
HDLC SERPL-MCNC: 55 MG/DL
HGB BLD-MCNC: 14.2 G/DL
IMM GRANULOCYTES NFR BLD AUTO: 0.1 %
LDLC SERPL CALC-MCNC: 69 MG/DL
LYMPHOCYTES # BLD AUTO: 1.82 K/UL
LYMPHOCYTES NFR BLD AUTO: 25.2 %
MAN DIFF?: NORMAL
MCHC RBC-ENTMCNC: 30.3 PG
MCHC RBC-ENTMCNC: 31.6 GM/DL
MCV RBC AUTO: 95.9 FL
MONOCYTES # BLD AUTO: 0.5 K/UL
MONOCYTES NFR BLD AUTO: 6.9 %
NEUTROPHILS # BLD AUTO: 4.78 K/UL
NEUTROPHILS NFR BLD AUTO: 66.4 %
NONHDLC SERPL-MCNC: 83 MG/DL
PLATELET # BLD AUTO: 186 K/UL
POTASSIUM SERPL-SCNC: 4.4 MMOL/L
PROT SERPL-MCNC: 7.3 G/DL
RBC # BLD: 4.69 M/UL
RBC # FLD: 13.3 %
SODIUM SERPL-SCNC: 141 MMOL/L
TRIGL SERPL-MCNC: 70 MG/DL
TSH SERPL-ACNC: 1.63 UIU/ML
WBC # FLD AUTO: 7.21 K/UL

## 2024-07-03 ENCOUNTER — APPOINTMENT (OUTPATIENT)
Dept: DERMATOLOGY | Facility: CLINIC | Age: 44
End: 2024-07-03

## 2024-07-19 ENCOUNTER — APPOINTMENT (OUTPATIENT)
Dept: CARDIOLOGY | Facility: CLINIC | Age: 44
End: 2024-07-19

## 2024-07-29 ENCOUNTER — NON-APPOINTMENT (OUTPATIENT)
Age: 44
End: 2024-07-29

## 2024-07-29 ENCOUNTER — APPOINTMENT (OUTPATIENT)
Dept: CARDIOLOGY | Facility: CLINIC | Age: 44
End: 2024-07-29
Payer: COMMERCIAL

## 2024-07-29 VITALS
OXYGEN SATURATION: 98 % | HEIGHT: 63 IN | DIASTOLIC BLOOD PRESSURE: 68 MMHG | TEMPERATURE: 98 F | WEIGHT: 177 LBS | SYSTOLIC BLOOD PRESSURE: 100 MMHG | BODY MASS INDEX: 31.36 KG/M2 | HEART RATE: 64 BPM

## 2024-07-29 DIAGNOSIS — Z01.818 ENCOUNTER FOR OTHER PREPROCEDURAL EXAMINATION: ICD-10-CM

## 2024-07-29 DIAGNOSIS — I34.1 NONRHEUMATIC MITRAL (VALVE) PROLAPSE: ICD-10-CM

## 2024-07-29 PROCEDURE — 93000 ELECTROCARDIOGRAM COMPLETE: CPT | Mod: NC

## 2024-07-29 PROCEDURE — G2211 COMPLEX E/M VISIT ADD ON: CPT | Mod: NC

## 2024-07-29 PROCEDURE — 99214 OFFICE O/P EST MOD 30 MIN: CPT | Mod: 25

## 2024-07-29 NOTE — HISTORY OF PRESENT ILLNESS
[FreeTextEntry1] : 43 year old female referred by Dr. Sheppard who presents for follow up care. Her past medical history includes Graves Disease (thyroidectomy), IBS, anxiety, dental infection and abscess history, and tricuspid as well as mitral valve disease. prior tobacco use.  She reports to the office today to discuss the possibility of Mitral Valve intervention. 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 Her thyroid levels have been fluctuation. She had originally established care with us in 2022 for her mitral valve regurgitation. There has been some discrepancy in imaging results from mild to severe regurgitation of the valve. She presents today after recent Transesophageal Echocardiogram imaging.   She has going to the gym and exercising since August. She is feeling overall well. She has mild shortness of breath with exertion.    7/29/24: pre endoscopic cardiac evaluation. wants to undergo endoscopic examinations given IBS progression. goes to gym 5x per week without limitation functional capacity has met with CTS and has moderate MR, repeat 2d TTE in 1 year No symptoms, no shortness of breath, no palpitations, no fainting, no presyncope or syncope Denies chest pain/pressure, palpitations, irregular and/or rapid heart beat, SOB, BLANCO, syncope/near syncope, dizziness, orthopnea, PND, cough, edema, f/c, n/v/d, hematuria, or hematochezia.

## 2024-07-29 NOTE — ASSESSMENT
[FreeTextEntry1] : 43 year old female referred by Dr. Sheppard who presents for follow up care. Her past medical history includes Graves Disease (thyroidectomy), IBS, anxiety, dental infection and abscess history, and tricuspid as well as mitral valve disease.

## 2024-07-29 NOTE — REVIEW OF SYSTEMS
[Feeling Poorly] : not feeling poorly [Feeling Tired] : not feeling tired [Chest Pain] : no chest pain [Palpitations] : no palpitations [Lower Ext Edema] : no lower extremity edema [Shortness Of Breath] : no shortness of breath [SOB on Exertion] : no shortness of breath during exertion [Negative] : Heme/Lymph

## 2024-07-29 NOTE — DISCUSSION/SUMMARY
[FreeTextEntry1] : Patient presents for: cardiac pre-endoscopic evaluation     +cardiac evaluation for pre-endoscopic procedure evaluation moderate MR, stage B valvular disease Graves disease IBS      Orders placed including imaging/meds:  repeat 2d TTE in 1 year april 2025  patient is not prohibitive risk for planned endoscopic procedures. she is optimized from the cardiac perspective during evaluation today.  Prior to valvular intervention in future, would recommend a cardiac MRI or repeat TOREY in future and I discussed this with the patient.   Patient warm and euvolemic. There is no evidence of active ACS, decompensated HF, uncontrolled arrhythmias or significant valvular heart disease at present. EKG is non-ischemic. We went over the EKG in office today, all questions answered. Vitals reviewed. Cardiac testing recent reviewed.    I've asked the patient to keep a blood pressure journal.  The patient is aware of alarm cardiac type symptoms, will call with questions or concerns and is aware to activate 911 and/or present to the closest emergency department if concerns.   Follow up: with me in 9-12 months or sooner as requested.     I strongly encouraged the patient to pursue the following preventative cardiology, lifestyle modifications which are necessary for long-term cardiovascular health. The following is recommended: Advised a mediterranean and/or plant predominant, high fiber, limited salt (ideal <2 g/day), low fat diet, stress reduction/psychosomatic management, sleep hygiene/KING testing and healthy weight loss, annual influenza/preventive vaccines, medication + treatment adherence/compliance, high risk behavior mitigation (I.e. tobacco abstinence, alcohol abstinence, no binge drinking, recreational/illicit drug use abstinence), increased exercise activity aiming for 150 minutes per week of moderate physical activity as per AHA/ACC standard for long term cardiovascular risk reduction [EKG obtained to assist in diagnosis and management of assessed problem(s)] : EKG obtained to assist in diagnosis and management of assessed problem(s)

## 2024-07-29 NOTE — PHYSICAL EXAM
[Sclera] : the sclera and conjunctiva were normal [Neck Appearance] : the appearance of the neck was normal [Respiration, Rhythm And Depth] : normal respiratory rhythm and effort [Auscultation Breath Sounds / Voice Sounds] : lungs were clear to auscultation bilaterally [Heart Rate And Rhythm] : heart rate was normal and rhythm regular [Heart Sounds] : normal S1 and S2 [Examination Of The Chest] : the chest was normal in appearance [Abnormal Walk] : normal gait [Skin Color & Pigmentation] : normal skin color and pigmentation [Sensation] : the sensory exam was normal to light touch and pinprick [Oriented To Time, Place, And Person] : oriented to person, place, and time [Impaired Insight] : insight and judgment were intact [FreeTextEntry1] : NYHAC I   Grade 2 murmur [Well Developed] : well developed [Well Nourished] : well nourished [No Acute Distress] : no acute distress [Obese] : obese [Normal Conjunctiva] : normal conjunctiva [Normal Venous Pressure] : normal venous pressure [No Carotid Bruit] : no carotid bruit [Normal S1, S2] : normal S1, S2 [No Murmur] : no murmur [No Rub] : no rub [No Gallop] : no gallop [Clear Lung Fields] : clear lung fields [Good Air Entry] : good air entry [No Respiratory Distress] : no respiratory distress  [Soft] : abdomen soft [Non Tender] : non-tender [No Masses/organomegaly] : no masses/organomegaly [Normal Bowel Sounds] : normal bowel sounds [Normal Gait] : normal gait [No Edema] : no edema [No Cyanosis] : no cyanosis [No Clubbing] : no clubbing [No Varicosities] : no varicosities [No Rash] : no rash [No Skin Lesions] : no skin lesions [Moves all extremities] : moves all extremities [No Focal Deficits] : no focal deficits [Normal Speech] : normal speech [Alert and Oriented] : alert and oriented [Normal memory] : normal memory

## 2024-08-10 ENCOUNTER — OUTPATIENT (OUTPATIENT)
Dept: OUTPATIENT SERVICES | Facility: HOSPITAL | Age: 44
LOS: 1 days | End: 2024-08-10
Payer: COMMERCIAL

## 2024-08-10 ENCOUNTER — APPOINTMENT (OUTPATIENT)
Dept: ULTRASOUND IMAGING | Facility: CLINIC | Age: 44
End: 2024-08-10

## 2024-08-10 ENCOUNTER — APPOINTMENT (OUTPATIENT)
Dept: MAMMOGRAPHY | Facility: CLINIC | Age: 44
End: 2024-08-10

## 2024-08-10 DIAGNOSIS — E89.0 POSTPROCEDURAL HYPOTHYROIDISM: Chronic | ICD-10-CM

## 2024-08-10 DIAGNOSIS — Z00.8 ENCOUNTER FOR OTHER GENERAL EXAMINATION: ICD-10-CM

## 2024-08-10 PROCEDURE — 77067 SCR MAMMO BI INCL CAD: CPT

## 2024-08-10 PROCEDURE — 76641 ULTRASOUND BREAST COMPLETE: CPT

## 2024-08-10 PROCEDURE — 76641 ULTRASOUND BREAST COMPLETE: CPT | Mod: 26,50

## 2024-08-10 PROCEDURE — 77063 BREAST TOMOSYNTHESIS BI: CPT

## 2024-08-10 PROCEDURE — 77063 BREAST TOMOSYNTHESIS BI: CPT | Mod: 26

## 2024-08-10 PROCEDURE — 77067 SCR MAMMO BI INCL CAD: CPT | Mod: 26

## 2024-09-23 ENCOUNTER — APPOINTMENT (OUTPATIENT)
Dept: INTERNAL MEDICINE | Facility: CLINIC | Age: 44
End: 2024-09-23
Payer: COMMERCIAL

## 2024-09-23 VITALS
SYSTOLIC BLOOD PRESSURE: 104 MMHG | WEIGHT: 177 LBS | OXYGEN SATURATION: 98 % | TEMPERATURE: 97.3 F | DIASTOLIC BLOOD PRESSURE: 67 MMHG | HEART RATE: 54 BPM | BODY MASS INDEX: 31.36 KG/M2 | HEIGHT: 63 IN | RESPIRATION RATE: 16 BRPM

## 2024-09-23 DIAGNOSIS — E78.5 HYPERLIPIDEMIA, UNSPECIFIED: ICD-10-CM

## 2024-09-23 DIAGNOSIS — K58.9 IRRITABLE BOWEL SYNDROME W/OUT DIARRHEA: ICD-10-CM

## 2024-09-23 DIAGNOSIS — Z12.31 ENCOUNTER FOR SCREENING MAMMOGRAM FOR MALIGNANT NEOPLASM OF BREAST: ICD-10-CM

## 2024-09-23 DIAGNOSIS — E03.9 HYPOTHYROIDISM, UNSPECIFIED: ICD-10-CM

## 2024-09-23 PROCEDURE — G2211 COMPLEX E/M VISIT ADD ON: CPT | Mod: NC

## 2024-09-23 PROCEDURE — 99214 OFFICE O/P EST MOD 30 MIN: CPT

## 2024-09-23 NOTE — HISTORY OF PRESENT ILLNESS
[FreeTextEntry1] : f/u hypothyroidism / IBS [de-identified] : Marimar is a 42 yo F w PMHx graves disease s/p thyroidectomy now hypothyroid, current smoker, mitral valve prolapse, IBS Follows with cardiology She remains on linzess for IBS she is not feeling better  She will be going for a colonoscopy 10/15/24. She is also seeing her gyn - has apt today for a sonogram  Mammogram: 07/2024- thru Gyn - normal as per pt

## 2024-09-23 NOTE — PLAN
[FreeTextEntry1] : A/ P:  HLD:  Advised on strict low-fat diet, daily regular exercise, to c/w meds, FLP/LFT to be monitored. Hypothyroidism: stable on meds ,to ck TFT's  IBS: stable, to f/u with GI - will be going for colonoscopy. f/u 3 mo

## 2024-10-15 ENCOUNTER — OUTPATIENT (OUTPATIENT)
Dept: OUTPATIENT SERVICES | Facility: HOSPITAL | Age: 44
LOS: 1 days | End: 2024-10-15
Payer: COMMERCIAL

## 2024-10-15 ENCOUNTER — TRANSCRIPTION ENCOUNTER (OUTPATIENT)
Age: 44
End: 2024-10-15

## 2024-10-15 ENCOUNTER — APPOINTMENT (OUTPATIENT)
Dept: GASTROENTEROLOGY | Facility: GI CENTER | Age: 44
End: 2024-10-15
Payer: COMMERCIAL

## 2024-10-15 ENCOUNTER — RESULT REVIEW (OUTPATIENT)
Age: 44
End: 2024-10-15

## 2024-10-15 DIAGNOSIS — E89.0 POSTPROCEDURAL HYPOTHYROIDISM: Chronic | ICD-10-CM

## 2024-10-15 DIAGNOSIS — K59.00 CONSTIPATION, UNSPECIFIED: ICD-10-CM

## 2024-10-15 DIAGNOSIS — R19.4 CHANGE IN BOWEL HABIT: ICD-10-CM

## 2024-10-15 DIAGNOSIS — R10.84 GENERALIZED ABDOMINAL PAIN: ICD-10-CM

## 2024-10-15 PROCEDURE — 45385 COLONOSCOPY W/LESION REMOVAL: CPT

## 2024-10-15 PROCEDURE — 88305 TISSUE EXAM BY PATHOLOGIST: CPT

## 2024-10-15 PROCEDURE — 88305 TISSUE EXAM BY PATHOLOGIST: CPT | Mod: 26

## 2024-10-17 LAB — SURGICAL PATHOLOGY STUDY: SIGNIFICANT CHANGE UP

## 2024-11-13 ENCOUNTER — NON-APPOINTMENT (OUTPATIENT)
Age: 44
End: 2024-11-13

## 2024-12-02 NOTE — ED PROVIDER NOTE - PRINCIPAL DIAGNOSIS
Myalgia
Detail Level: Zone
Patient Specific Otc Recommendations (Will Not Stick From Patient To Patient): Begin the following treatments: cerave sa cream vs amlactin cream daily after showers. Gentle cleansers discussed. Basic skin care reviewed.

## 2024-12-20 ENCOUNTER — APPOINTMENT (OUTPATIENT)
Dept: INTERNAL MEDICINE | Facility: CLINIC | Age: 44
End: 2024-12-20
Payer: COMMERCIAL

## 2024-12-20 PROCEDURE — 36415 COLL VENOUS BLD VENIPUNCTURE: CPT

## 2024-12-27 ENCOUNTER — APPOINTMENT (OUTPATIENT)
Dept: INTERNAL MEDICINE | Facility: CLINIC | Age: 44
End: 2024-12-27
Payer: COMMERCIAL

## 2024-12-27 VITALS
BODY MASS INDEX: 31.36 KG/M2 | WEIGHT: 177 LBS | OXYGEN SATURATION: 100 % | HEIGHT: 63 IN | DIASTOLIC BLOOD PRESSURE: 51 MMHG | SYSTOLIC BLOOD PRESSURE: 100 MMHG | RESPIRATION RATE: 16 BRPM | HEART RATE: 38 BPM | TEMPERATURE: 97.3 F

## 2024-12-27 DIAGNOSIS — K58.9 IRRITABLE BOWEL SYNDROME, UNSPECIFIED: ICD-10-CM

## 2024-12-27 DIAGNOSIS — E78.5 HYPERLIPIDEMIA, UNSPECIFIED: ICD-10-CM

## 2024-12-27 DIAGNOSIS — M25.561 PAIN IN RIGHT KNEE: ICD-10-CM

## 2024-12-27 DIAGNOSIS — M79.642 PAIN IN LEFT HAND: ICD-10-CM

## 2024-12-27 DIAGNOSIS — E55.9 VITAMIN D DEFICIENCY, UNSPECIFIED: ICD-10-CM

## 2024-12-27 DIAGNOSIS — E03.9 HYPOTHYROIDISM, UNSPECIFIED: ICD-10-CM

## 2024-12-27 PROCEDURE — 99214 OFFICE O/P EST MOD 30 MIN: CPT

## 2024-12-27 PROCEDURE — G2211 COMPLEX E/M VISIT ADD ON: CPT | Mod: NC

## 2025-01-13 ENCOUNTER — RX RENEWAL (OUTPATIENT)
Age: 45
End: 2025-01-13

## 2025-04-02 ENCOUNTER — APPOINTMENT (OUTPATIENT)
Dept: INTERNAL MEDICINE | Facility: CLINIC | Age: 45
End: 2025-04-02
Payer: COMMERCIAL

## 2025-04-02 PROCEDURE — 36415 COLL VENOUS BLD VENIPUNCTURE: CPT

## 2025-04-03 LAB
ANION GAP SERPL CALC-SCNC: 11 MMOL/L
BUN SERPL-MCNC: 18 MG/DL
CALCIUM SERPL-MCNC: 9.4 MG/DL
CHLORIDE SERPL-SCNC: 103 MMOL/L
CHOLEST SERPL-MCNC: 150 MG/DL
CO2 SERPL-SCNC: 27 MMOL/L
CREAT SERPL-MCNC: 1.07 MG/DL
EGFRCR SERPLBLD CKD-EPI 2021: 67 ML/MIN/1.73M2
ESTIMATED AVERAGE GLUCOSE: 120 MG/DL
FOLATE SERPL-MCNC: 13.2 NG/ML
GLUCOSE SERPL-MCNC: 107 MG/DL
HBA1C MFR BLD HPLC: 5.8 %
HCT VFR BLD CALC: 43.6 %
HDLC SERPL-MCNC: 56 MG/DL
HGB BLD-MCNC: 13.8 G/DL
LDLC SERPL-MCNC: 77 MG/DL
MCHC RBC-ENTMCNC: 30.1 PG
MCHC RBC-ENTMCNC: 31.7 G/DL
MCV RBC AUTO: 95.2 FL
NONHDLC SERPL-MCNC: 93 MG/DL
PLATELET # BLD AUTO: 195 K/UL
POTASSIUM SERPL-SCNC: 4 MMOL/L
RBC # BLD: 4.58 M/UL
RBC # FLD: 13.2 %
SODIUM SERPL-SCNC: 140 MMOL/L
TRIGL SERPL-MCNC: 87 MG/DL
TSH SERPL-ACNC: 2.88 UIU/ML
VIT B12 SERPL-MCNC: 496 PG/ML
WBC # FLD AUTO: 6.2 K/UL

## 2025-04-07 ENCOUNTER — RESULT REVIEW (OUTPATIENT)
Age: 45
End: 2025-04-07

## 2025-04-07 ENCOUNTER — APPOINTMENT (OUTPATIENT)
Dept: INTERNAL MEDICINE | Facility: CLINIC | Age: 45
End: 2025-04-07
Payer: COMMERCIAL

## 2025-04-07 ENCOUNTER — OUTPATIENT (OUTPATIENT)
Dept: OUTPATIENT SERVICES | Facility: HOSPITAL | Age: 45
LOS: 1 days | End: 2025-04-07
Payer: COMMERCIAL

## 2025-04-07 VITALS
HEART RATE: 76 BPM | SYSTOLIC BLOOD PRESSURE: 88 MMHG | HEIGHT: 63 IN | TEMPERATURE: 97.2 F | OXYGEN SATURATION: 98 % | BODY MASS INDEX: 31.18 KG/M2 | WEIGHT: 176 LBS | RESPIRATION RATE: 16 BRPM | DIASTOLIC BLOOD PRESSURE: 60 MMHG

## 2025-04-07 DIAGNOSIS — I34.0 NONRHEUMATIC MITRAL (VALVE) INSUFFICIENCY: ICD-10-CM

## 2025-04-07 DIAGNOSIS — E78.5 HYPERLIPIDEMIA, UNSPECIFIED: ICD-10-CM

## 2025-04-07 DIAGNOSIS — E03.9 HYPOTHYROIDISM, UNSPECIFIED: ICD-10-CM

## 2025-04-07 DIAGNOSIS — R20.2 PARESTHESIA OF SKIN: ICD-10-CM

## 2025-04-07 DIAGNOSIS — E89.0 POSTPROCEDURAL HYPOTHYROIDISM: Chronic | ICD-10-CM

## 2025-04-07 DIAGNOSIS — E55.9 VITAMIN D DEFICIENCY, UNSPECIFIED: ICD-10-CM

## 2025-04-07 PROCEDURE — 99214 OFFICE O/P EST MOD 30 MIN: CPT

## 2025-04-07 PROCEDURE — G2211 COMPLEX E/M VISIT ADD ON: CPT | Mod: NC

## 2025-04-07 PROCEDURE — 93306 TTE W/DOPPLER COMPLETE: CPT | Mod: 26

## 2025-04-07 PROCEDURE — 93306 TTE W/DOPPLER COMPLETE: CPT

## 2025-04-08 ENCOUNTER — APPOINTMENT (OUTPATIENT)
Dept: CARDIOTHORACIC SURGERY | Facility: CLINIC | Age: 45
End: 2025-04-08
Payer: COMMERCIAL

## 2025-04-08 DIAGNOSIS — I34.1 NONRHEUMATIC MITRAL (VALVE) PROLAPSE: ICD-10-CM

## 2025-04-08 DIAGNOSIS — I34.0 NONRHEUMATIC MITRAL (VALVE) INSUFFICIENCY: ICD-10-CM

## 2025-04-08 DIAGNOSIS — I07.1 RHEUMATIC TRICUSPID INSUFFICIENCY: ICD-10-CM

## 2025-04-08 PROCEDURE — 99213 OFFICE O/P EST LOW 20 MIN: CPT

## 2025-04-23 NOTE — ED ADULT NURSE NOTE - RESPIRATORY WDL
Price (Do Not Change): 0.00 Instructions: This plan will send the code FBSE to the PM system.  DO NOT or CHANGE the price. Detail Level: Simple Breathing spontaneous and unlabored. Breath sounds clear and equal bilaterally with regular rhythm.

## 2025-04-27 ENCOUNTER — OUTPATIENT (OUTPATIENT)
Dept: OUTPATIENT SERVICES | Facility: HOSPITAL | Age: 45
LOS: 1 days | End: 2025-04-27
Payer: COMMERCIAL

## 2025-04-27 DIAGNOSIS — R20.2 PARESTHESIA OF SKIN: ICD-10-CM

## 2025-04-27 DIAGNOSIS — E89.0 POSTPROCEDURAL HYPOTHYROIDISM: Chronic | ICD-10-CM

## 2025-04-27 DIAGNOSIS — Z00.8 ENCOUNTER FOR OTHER GENERAL EXAMINATION: ICD-10-CM

## 2025-04-27 PROCEDURE — 70551 MRI BRAIN STEM W/O DYE: CPT

## 2025-05-08 ENCOUNTER — APPOINTMENT (OUTPATIENT)
Dept: NEUROLOGY | Facility: CLINIC | Age: 45
End: 2025-05-08
Payer: COMMERCIAL

## 2025-05-08 VITALS
DIASTOLIC BLOOD PRESSURE: 78 MMHG | HEART RATE: 61 BPM | HEIGHT: 63 IN | WEIGHT: 174 LBS | SYSTOLIC BLOOD PRESSURE: 125 MMHG | BODY MASS INDEX: 30.83 KG/M2

## 2025-05-08 DIAGNOSIS — Z86.59 PERSONAL HISTORY OF OTHER MENTAL AND BEHAVIORAL DISORDERS: ICD-10-CM

## 2025-05-08 DIAGNOSIS — Z86.39 PERSONAL HISTORY OF OTHER ENDOCRINE, NUTRITIONAL AND METABOLIC DISEASE: ICD-10-CM

## 2025-05-08 DIAGNOSIS — G44.89 OTHER HEADACHE SYNDROME: ICD-10-CM

## 2025-05-08 PROCEDURE — 99204 OFFICE O/P NEW MOD 45 MIN: CPT

## 2025-05-08 PROCEDURE — G2211 COMPLEX E/M VISIT ADD ON: CPT | Mod: NC

## 2025-05-08 RX ORDER — NORTRIPTYLINE HYDROCHLORIDE 25 MG/1
25 CAPSULE ORAL
Qty: 90 | Refills: 1 | Status: ACTIVE | COMMUNITY
Start: 2025-05-08 | End: 1900-01-01

## 2025-05-26 ENCOUNTER — EMERGENCY (EMERGENCY)
Facility: HOSPITAL | Age: 45
LOS: 1 days | End: 2025-05-26
Attending: STUDENT IN AN ORGANIZED HEALTH CARE EDUCATION/TRAINING PROGRAM
Payer: COMMERCIAL

## 2025-05-26 VITALS
HEART RATE: 58 BPM | DIASTOLIC BLOOD PRESSURE: 71 MMHG | OXYGEN SATURATION: 96 % | SYSTOLIC BLOOD PRESSURE: 115 MMHG | TEMPERATURE: 98 F | RESPIRATION RATE: 16 BRPM

## 2025-05-26 VITALS
RESPIRATION RATE: 20 BRPM | TEMPERATURE: 98 F | HEIGHT: 65 IN | HEART RATE: 63 BPM | DIASTOLIC BLOOD PRESSURE: 69 MMHG | OXYGEN SATURATION: 100 % | WEIGHT: 151.9 LBS | SYSTOLIC BLOOD PRESSURE: 115 MMHG

## 2025-05-26 DIAGNOSIS — R00.2 PALPITATIONS: ICD-10-CM

## 2025-05-26 DIAGNOSIS — E89.0 POSTPROCEDURAL HYPOTHYROIDISM: Chronic | ICD-10-CM

## 2025-05-26 LAB
ALBUMIN SERPL ELPH-MCNC: 3.9 G/DL — SIGNIFICANT CHANGE UP (ref 3.3–5.2)
ALP SERPL-CCNC: 69 U/L — SIGNIFICANT CHANGE UP (ref 40–120)
ALT FLD-CCNC: 13 U/L — SIGNIFICANT CHANGE UP
ANION GAP SERPL CALC-SCNC: 12 MMOL/L — SIGNIFICANT CHANGE UP (ref 5–17)
APTT BLD: 33.4 SEC — SIGNIFICANT CHANGE UP (ref 26.1–36.8)
AST SERPL-CCNC: 25 U/L — SIGNIFICANT CHANGE UP
BASOPHILS # BLD AUTO: 0.03 K/UL — SIGNIFICANT CHANGE UP (ref 0–0.2)
BASOPHILS NFR BLD AUTO: 0.6 % — SIGNIFICANT CHANGE UP (ref 0–2)
BILIRUB SERPL-MCNC: 0.3 MG/DL — LOW (ref 0.4–2)
BUN SERPL-MCNC: 12.7 MG/DL — SIGNIFICANT CHANGE UP (ref 8–20)
CALCIUM SERPL-MCNC: 8.2 MG/DL — LOW (ref 8.4–10.5)
CHLORIDE SERPL-SCNC: 105 MMOL/L — SIGNIFICANT CHANGE UP (ref 96–108)
CO2 SERPL-SCNC: 24 MMOL/L — SIGNIFICANT CHANGE UP (ref 22–29)
CREAT SERPL-MCNC: 0.85 MG/DL — SIGNIFICANT CHANGE UP (ref 0.5–1.3)
EGFR: 88 ML/MIN/1.73M2 — SIGNIFICANT CHANGE UP
EGFR: 88 ML/MIN/1.73M2 — SIGNIFICANT CHANGE UP
EOSINOPHIL # BLD AUTO: 0.08 K/UL — SIGNIFICANT CHANGE UP (ref 0–0.5)
EOSINOPHIL NFR BLD AUTO: 1.6 % — SIGNIFICANT CHANGE UP (ref 0–6)
GLUCOSE SERPL-MCNC: 87 MG/DL — SIGNIFICANT CHANGE UP (ref 70–99)
HCT VFR BLD CALC: 40.2 % — SIGNIFICANT CHANGE UP (ref 34.5–45)
HGB BLD-MCNC: 13.3 G/DL — SIGNIFICANT CHANGE UP (ref 11.5–15.5)
IMM GRANULOCYTES # BLD AUTO: 0.01 K/UL — SIGNIFICANT CHANGE UP (ref 0–0.07)
IMM GRANULOCYTES NFR BLD AUTO: 0.2 % — SIGNIFICANT CHANGE UP (ref 0–0.9)
INR BLD: 1.04 RATIO — SIGNIFICANT CHANGE UP (ref 0.85–1.16)
LIDOCAIN IGE QN: 17 U/L — LOW (ref 22–51)
LYMPHOCYTES # BLD AUTO: 1.54 K/UL — SIGNIFICANT CHANGE UP (ref 1–3.3)
LYMPHOCYTES NFR BLD AUTO: 30.1 % — SIGNIFICANT CHANGE UP (ref 13–44)
MAGNESIUM SERPL-MCNC: 1.8 MG/DL — SIGNIFICANT CHANGE UP (ref 1.6–2.6)
MCHC RBC-ENTMCNC: 30.9 PG — SIGNIFICANT CHANGE UP (ref 27–34)
MCHC RBC-ENTMCNC: 33.1 G/DL — SIGNIFICANT CHANGE UP (ref 32–36)
MCV RBC AUTO: 93.3 FL — SIGNIFICANT CHANGE UP (ref 80–100)
MONOCYTES # BLD AUTO: 0.38 K/UL — SIGNIFICANT CHANGE UP (ref 0–0.9)
MONOCYTES NFR BLD AUTO: 7.4 % — SIGNIFICANT CHANGE UP (ref 2–14)
NEUTROPHILS # BLD AUTO: 3.07 K/UL — SIGNIFICANT CHANGE UP (ref 1.8–7.4)
NEUTROPHILS NFR BLD AUTO: 60.1 % — SIGNIFICANT CHANGE UP (ref 43–77)
NRBC # BLD AUTO: 0 K/UL — SIGNIFICANT CHANGE UP (ref 0–0)
NRBC # FLD: 0 K/UL — SIGNIFICANT CHANGE UP (ref 0–0)
NRBC BLD AUTO-RTO: 0 /100 WBCS — SIGNIFICANT CHANGE UP (ref 0–0)
NT-PROBNP SERPL-SCNC: 106 PG/ML — SIGNIFICANT CHANGE UP (ref 0–300)
PLATELET # BLD AUTO: 169 K/UL — SIGNIFICANT CHANGE UP (ref 150–400)
PMV BLD: 11.3 FL — SIGNIFICANT CHANGE UP (ref 7–13)
POTASSIUM SERPL-MCNC: 4.2 MMOL/L — SIGNIFICANT CHANGE UP (ref 3.5–5.3)
POTASSIUM SERPL-SCNC: 4.2 MMOL/L — SIGNIFICANT CHANGE UP (ref 3.5–5.3)
PROT SERPL-MCNC: 6.3 G/DL — LOW (ref 6.6–8.7)
PROTHROM AB SERPL-ACNC: 11.7 SEC — SIGNIFICANT CHANGE UP (ref 9.9–13.4)
RBC # BLD: 4.31 M/UL — SIGNIFICANT CHANGE UP (ref 3.8–5.2)
RBC # FLD: 12.7 % — SIGNIFICANT CHANGE UP (ref 10.3–14.5)
SODIUM SERPL-SCNC: 140 MMOL/L — SIGNIFICANT CHANGE UP (ref 135–145)
TROPONIN T, HIGH SENSITIVITY RESULT: 13 NG/L — SIGNIFICANT CHANGE UP (ref 0–51)
TROPONIN T, HIGH SENSITIVITY RESULT: 9 NG/L — SIGNIFICANT CHANGE UP (ref 0–51)
WBC # BLD: 5.11 K/UL — SIGNIFICANT CHANGE UP (ref 3.8–10.5)
WBC # FLD AUTO: 5.11 K/UL — SIGNIFICANT CHANGE UP (ref 3.8–10.5)

## 2025-05-26 PROCEDURE — 85730 THROMBOPLASTIN TIME PARTIAL: CPT

## 2025-05-26 PROCEDURE — 99285 EMERGENCY DEPT VISIT HI MDM: CPT | Mod: 25

## 2025-05-26 PROCEDURE — 71046 X-RAY EXAM CHEST 2 VIEWS: CPT

## 2025-05-26 PROCEDURE — 93010 ELECTROCARDIOGRAM REPORT: CPT

## 2025-05-26 PROCEDURE — 36415 COLL VENOUS BLD VENIPUNCTURE: CPT

## 2025-05-26 PROCEDURE — 83880 ASSAY OF NATRIURETIC PEPTIDE: CPT

## 2025-05-26 PROCEDURE — 83690 ASSAY OF LIPASE: CPT

## 2025-05-26 PROCEDURE — 84443 ASSAY THYROID STIM HORMONE: CPT

## 2025-05-26 PROCEDURE — 85025 COMPLETE CBC W/AUTO DIFF WBC: CPT

## 2025-05-26 PROCEDURE — 83735 ASSAY OF MAGNESIUM: CPT

## 2025-05-26 PROCEDURE — 84436 ASSAY OF TOTAL THYROXINE: CPT

## 2025-05-26 PROCEDURE — 96375 TX/PRO/DX INJ NEW DRUG ADDON: CPT

## 2025-05-26 PROCEDURE — 85610 PROTHROMBIN TIME: CPT

## 2025-05-26 PROCEDURE — 84484 ASSAY OF TROPONIN QUANT: CPT

## 2025-05-26 PROCEDURE — 71046 X-RAY EXAM CHEST 2 VIEWS: CPT | Mod: 26

## 2025-05-26 PROCEDURE — 93005 ELECTROCARDIOGRAM TRACING: CPT

## 2025-05-26 PROCEDURE — 99285 EMERGENCY DEPT VISIT HI MDM: CPT

## 2025-05-26 PROCEDURE — 80053 COMPREHEN METABOLIC PANEL: CPT

## 2025-05-26 PROCEDURE — 96365 THER/PROPH/DIAG IV INF INIT: CPT

## 2025-05-26 RX ORDER — MAGNESIUM SULFATE 500 MG/ML
2 SYRINGE (ML) INJECTION ONCE
Refills: 0 | Status: COMPLETED | OUTPATIENT
Start: 2025-05-26 | End: 2025-05-26

## 2025-05-26 RX ORDER — METOPROLOL SUCCINATE 50 MG/1
25 TABLET, EXTENDED RELEASE ORAL ONCE
Refills: 0 | Status: COMPLETED | OUTPATIENT
Start: 2025-05-26 | End: 2025-05-26

## 2025-05-26 RX ADMIN — Medication 25 GRAM(S): at 18:01

## 2025-05-26 RX ADMIN — Medication 25 GRAM(S): at 17:15

## 2025-05-26 RX ADMIN — Medication 2 GRAM(S): at 18:30

## 2025-05-26 NOTE — ED ADULT NURSE REASSESSMENT NOTE - NS ED NURSE REASSESS COMMENT FT1
pt AAOX4, resp. even and unlabored on RA, pt NSR on CM, pt endorses she took her home dose of metropolol @ the bedside, MD made aware, repeat troponin drawn and sent per MD orders, pt still endorses intermittent palpitations w/o CP/SOB, pt educated about plan of care, pt demonstrates understanding through use of teach back demonstration, safety maintained.

## 2025-05-26 NOTE — ED ADULT TRIAGE NOTE - HEART RATE (BEATS/MIN)
63
You can access the FollowMyHealth Patient Portal offered by [facility] by registering at the following website: [email or web address]. By joining [employer]’s FollowMyHealth portal, you will also be able to view your health information using other applications (apps) compatible with our system.

## 2025-05-26 NOTE — ED PROVIDER NOTE - NSFOLLOWUPINSTRUCTIONS_ED_ALL_ED_FT
Palpitations    A palpitation is the feeling that your heartbeat is irregular or is faster than normal. It may feel like your heart is fluttering or skipping a beat. They may be caused by many things, including smoking, caffeine, alcohol, stress, and certain medicines. Although most causes of palpitations are not serious, palpitations can be a sign of a serious medical problem. Avoid caffeine, alcohol, and tobacco products at home. Try to reduce stress and anxiety and make sure to get plenty of rest.     SEEK IMMEDIATE MEDICAL CARE IF YOU HAVE ANY OF THE FOLLOWING SYMPTOMS: chest pain, shortness of breath, severe headache, dizziness/lightheadedness, or fainting.    -Please follow-up with your primary care doctor and cardiologist in the next 2 days.  Please call tomorrow for an appointment.  If you cannot follow-up with your primary care doctor please return to the ED for any urgent issues.  - You were given a copy of the tests performed today.  Please bring the results with you and review them with your primary care doctor.  - If you have any worsening of symptoms or any other concerns please return to the ED immediately.  - Please continue taking your home medications as directed.

## 2025-05-26 NOTE — ED PROVIDER NOTE - OBJECTIVE STATEMENT
44-year-old female with a past medical history of graves disease s/p thyroidectomy now hypothyroid, current smoker, mitral valve prolapse, IBS,  palpitations presents for palpitations.  Patient states she has been under a lot of stress lately due to her mom passing and for the past few days having intermittent palpitations no known alleviating or exacerbating factors.  Patient reports compliance with her metoprolol,   Levothyroxine and other medications. Pt denies fevers/chills, ha, loc, focal neuro deficits, cp/sob, cough, abd pain/n/v/d, urinary symptoms, recent travel and sick contacts.

## 2025-05-26 NOTE — ED ADULT NURSE NOTE - NSFALLUNIVINTERV_ED_ALL_ED
Bed/Stretcher in lowest position, wheels locked, appropriate side rails in place/Call bell, personal items and telephone in reach/Instruct patient to call for assistance before getting out of bed/chair/stretcher/Non-slip footwear applied when patient is off stretcher/[place] to call system/Physically safe environment - no spills, clutter or unnecessary equipment/Purposeful proactive rounding/Room/bathroom lighting operational, light cord in reach

## 2025-05-26 NOTE — CONSULT NOTE ADULT - SUBJECTIVE AND OBJECTIVE BOX
[facility]                                              CARDIOLOGY AT [place] [address]                                             Telephone: [phone]. Fax:[phone]                                                         CARDIOLOGY CONSULTATION NOTE                                                                                             Consult requested by: Dr Silverio  History obtained by: Patient and medical record  Community Cardiologist:  Dr. Nesbitt   obtained: Yes [  ] No [x  ]  Reason for Consultation: Palpitations  Avialable out pt records reviewed: Yes [  ] No [x  ]      This is a 45 y/o female with hx goiter s/p surgery, glaucoma,  IBS, anxiety, dental infection and abscess history, MVP (mild to moderate mr) who presents to the ER with palpitations. States that frequently when she lies down  she feels palpitations and it has been worrisome for her.  She denies any chest pain  syncope Lmp few months ago told she was in menopause  When palpitations come they last a few minutes till she quiets down    CARDIAC TESTING   ECHO:  < from: TTE W or WO Ultrasound Enhancing Agent (04.07.25 @ 12:27) >   1. Left ventricular cavity is normal in size. Left ventricular wall thickness is normal. Left ventricular systolic function is low normal with an ejection fraction visually estimated at 50 to 55 %. There are no regional wall motion abnormalities seen.   2. Normal left ventricular diastolic function, with normal left ventricular filling pressure.   3. Normal right ventricular cavity size, with normal wall thickness, and normal right ventricular systolic function.   4. No pericardial effusion seen.   5. Frequent PVCs seen throughout study.   6. Compared to the transthoracic echocardiogram performed on 4/3/2023, there have been no significant interval changes.      PAST MEDICAL HISTORY  Mitral valve prolapse  mild to moderate mr  Goiter removed, Hyperthyroidism, Post-therapeutic hypothyroidism  Glaucoma    PAST SURGICAL HISTORY  History of thyroidectomy    SOCIAL HISTORY:  smokes 5 cig per day  Denies etoh    FAMILY HISTORY:  Family history of neoplasm of brain  Family history of breast cancer (Grandparent)  Family History of Cardiovascular Disease:  Yes [  ] No [ x ]  Coronary Artery Disease in first degree relative: Yes [  ] No [ x ]  Sudden Cardiac Death in First degree relative: Yes [  ] No [ x ]    HOME MEDICATIONS:  Toprol xl 25mg qd  Crestor 5mg qd    CURRENT MEDICATIONS:  metoprolol tartrate 25 milliGRAM(s) Oral Once    ALLERGIES: NKDA    REVIEW OF SYMPTOMS:   CONSTITUTIONAL: No fever, no chills, no weight loss, no weight gain, no fatigue   ENMT:  No vertigo; No sinus or throat pain  NECK: No pain or stiffness  CARDIOVASCULAR: no dyspnea, no syncope/presyncope, no palpitations, no dizziness, no Orthopnea, no Paroxsymal nocturnal dyspnea  RESPIRATORY: no Shortness of breath, no cough, no wheezing  : No dysuria, no hematuria   GI: No dark color stool, no nausea, no diarrhea, no constipation, no abdominal pain   NEURO: No headache, no slurred speech   MUSCULOSKELETAL: No joint pain or swelling; No muscle, back, or extremity pain  PSYCH: No agitation, no anxiety.    ALL OTHER REVIEW OF SYSTEMS ARE NEGATIVE.      Vital Signs Last 24 Hrs  T(C): 36.9 (26 May 2025 14:00), Max: 36.9 (26 May 2025 14:00)  T(F): 98.4 (26 May 2025 14:00), Max: 98.4 (26 May 2025 14:00)  HR: 61 (26 May 2025 15:12) (59 - 63)  BP: 95/59 (26 May 2025 15:12) (95/59 - 150/100)  BP(mean): --  RR: 18 (26 May 2025 15:12) (18 - 20)  SpO2: 100% (26 May 2025 15:12) (99% - 100%)    Parameters below as of 26 May 2025 15:12  Patient On (Oxygen Delivery Method): room air      INTAKE AND OUTPUT:     PHYSICAL EXAM:  Constitutional: Comfortable . No acute distress.   HEENT: Atraumatic and normocephalic , neck is supple . no JVD. No carotid bruit.  CNS: A&Ox3. No focal deficits.   Respiratory: CTAB, unlabored   Cardiovascular: RRR normal s1 s2. No murmur. No rubs or gallop.  Gastrointestinal: Soft, non-tender. +Bowel sounds.   MSK: full ROM extremities x 4  Extremities: No edema. No cyanosis   Psychiatric: Calm . no agitation.   Skin: Warm and dry, no ulcers on extremities       LABS:                        13.3   5.11  )-----------( 169      ( 26 May 2025 11:58 )             40.2     05-26    140  |  105  |  12.7  ----------------------------<  87  4.2   |  24.0  |  0.85    Ca    8.2[L]      26 May 2025 11:58  Mg     1.8     05-26    TPro  6.3[L]  /  Alb  3.9  /  TBili  0.3[L]  /  DBili  x   /  AST  25  /  ALT  13  /  AlkPhos  69  05-26      ;p-BNP=  PT/INR - ( 26 May 2025 11:58 )   PT: 11.7 sec;   INR: 1.04 ratio         PTT - ( 26 May 2025 11:58 )  PTT:33.4 sec  Urinalysis Basic - ( 26 May 2025 11:58 )    Color: x / Appearance: x / SG: x / pH: x  Gluc: 87 mg/dL / Ketone: x  / Bili: x / Urobili: x   Blood: x / Protein: x / Nitrite: x   Leuk Esterase: x / RBC: x / WBC x   Sq Epi: x / Non Sq Epi: x / Bacteria: x    INTERPRETATION OF TELEMETRY:  Nsr with frequent pvcs    ECG: Nsr non specific st changes  Prior ECG: Yes [  ] No [  ]    RADIOLOGY & ADDITIONAL STUDIES:    X-ray:  reviewed by me. [facility]

## 2025-05-26 NOTE — CONSULT NOTE ADULT - ASSESSMENT
45 y/o female with hx goiter s/p surgery, glaucoma,  IBS, anxiety, dental infection and abscess history, MVP (mild to moderate mr) who presents to the ER with palpitations. States that frequently when she lies down  she feels palpitations and it has been worrisome for her.  She denies any chest pain  syncope Lmp few months ago told she was in menopause.  When palpitations come they last a few minutes till she quiets down  Telemetry monitoring nsr with pvc  tsh wnl

## 2025-05-26 NOTE — ED PROVIDER NOTE - PATIENT PORTAL LINK FT
You can access the FollowMyHealth Patient Portal offered by [facility] by registering at the following website: [email or web address]. By joining [employer]’s FollowMyHealth portal, you will also be able to view your health information using other applications (apps) compatible with our system.

## 2025-05-30 DIAGNOSIS — E05.00 THYROTOXICOSIS WITH DIFFUSE GOITER WITHOUT THYROTOXIC CRISIS OR STORM: ICD-10-CM

## 2025-05-30 DIAGNOSIS — R00.2 PALPITATIONS: ICD-10-CM

## 2025-05-30 DIAGNOSIS — K58.9 IRRITABLE BOWEL SYNDROME, UNSPECIFIED: ICD-10-CM

## 2025-05-30 DIAGNOSIS — E03.9 HYPOTHYROIDISM, UNSPECIFIED: ICD-10-CM

## 2025-05-30 DIAGNOSIS — F17.200 NICOTINE DEPENDENCE, UNSPECIFIED, UNCOMPLICATED: ICD-10-CM

## 2025-06-03 ENCOUNTER — NON-APPOINTMENT (OUTPATIENT)
Age: 45
End: 2025-06-03

## 2025-06-09 ENCOUNTER — APPOINTMENT (OUTPATIENT)
Dept: ORTHOPEDIC SURGERY | Facility: CLINIC | Age: 45
End: 2025-06-09

## 2025-06-09 ENCOUNTER — APPOINTMENT (OUTPATIENT)
Dept: ORTHOPEDIC SURGERY | Facility: CLINIC | Age: 45
End: 2025-06-09
Payer: COMMERCIAL

## 2025-06-09 VITALS — WEIGHT: 175 LBS | BODY MASS INDEX: 31.01 KG/M2 | HEIGHT: 63 IN

## 2025-06-09 PROCEDURE — 73130 X-RAY EXAM OF HAND: CPT | Mod: LT

## 2025-06-09 PROCEDURE — 73562 X-RAY EXAM OF KNEE 3: CPT | Mod: RT

## 2025-06-09 PROCEDURE — 99204 OFFICE O/P NEW MOD 45 MIN: CPT

## 2025-06-09 RX ORDER — DICLOFENAC SODIUM 10 MG/G
1 GEL TOPICAL 4 TIMES DAILY
Qty: 1 | Refills: 2 | Status: ACTIVE | COMMUNITY
Start: 2025-06-09 | End: 1900-01-01

## 2025-06-28 ENCOUNTER — APPOINTMENT (OUTPATIENT)
Dept: MRI IMAGING | Facility: CLINIC | Age: 45
End: 2025-06-28

## 2025-07-06 PROBLEM — M18.12 PRIMARY OSTEOARTHRITIS OF FIRST CARPOMETACARPAL JOINT OF LEFT HAND: Status: ACTIVE | Noted: 2025-06-09

## 2025-07-06 PROBLEM — M23.91 INTERNAL DERANGEMENT OF RIGHT KNEE: Status: ACTIVE | Noted: 2025-06-09

## 2025-08-20 ENCOUNTER — APPOINTMENT (OUTPATIENT)
Dept: CARDIOLOGY | Facility: CLINIC | Age: 45
End: 2025-08-20

## (undated) DEVICE — TRAP QUICK CATCH  SINGL CHAMBER

## (undated) DEVICE — KIT DEFENDO 4 OLY 4 PC

## (undated) DEVICE — SNARE LESIONHUNTER ROTAT NITNL COLD 10MM